# Patient Record
Sex: MALE | Race: OTHER | Employment: OTHER | URBAN - METROPOLITAN AREA
[De-identification: names, ages, dates, MRNs, and addresses within clinical notes are randomized per-mention and may not be internally consistent; named-entity substitution may affect disease eponyms.]

---

## 2019-07-24 ENCOUNTER — HOSPITAL ENCOUNTER (INPATIENT)
Age: 84
LOS: 5 days | Discharge: HOME HEALTH | DRG: 871 | End: 2019-07-29
Attending: EMERGENCY MEDICINE | Admitting: INTERNAL MEDICINE
Payer: MEDICARE

## 2019-07-24 ENCOUNTER — APPOINTMENT (OUTPATIENT)
Dept: GENERAL RADIOLOGY | Age: 84
DRG: 871 | End: 2019-07-24
Payer: MEDICARE

## 2019-07-24 DIAGNOSIS — L89.302 PRESSURE INJURY OF BUTTOCK, STAGE 2, UNSPECIFIED LATERALITY (HCC): ICD-10-CM

## 2019-07-24 DIAGNOSIS — R73.9 HYPERGLYCEMIA: Primary | ICD-10-CM

## 2019-07-24 DIAGNOSIS — N28.9 ACUTE RENAL INSUFFICIENCY: ICD-10-CM

## 2019-07-24 DIAGNOSIS — J18.9 PNEUMONIA DUE TO ORGANISM: ICD-10-CM

## 2019-07-24 DIAGNOSIS — A41.9 SEPTICEMIA (HCC): ICD-10-CM

## 2019-07-24 LAB
ALBUMIN SERPL-MCNC: 2.3 G/DL (ref 3.5–4.6)
ALP BLD-CCNC: 120 U/L (ref 35–104)
ALT SERPL-CCNC: 26 U/L (ref 0–41)
ANION GAP SERPL CALCULATED.3IONS-SCNC: 13 MEQ/L (ref 9–15)
AST SERPL-CCNC: 22 U/L (ref 0–40)
BACTERIA: ABNORMAL /HPF
BASE EXCESS ARTERIAL: -8 (ref -3–3)
BASOPHILS ABSOLUTE: 0 K/UL (ref 0–0.2)
BASOPHILS RELATIVE PERCENT: 0.1 %
BILIRUB SERPL-MCNC: 0.3 MG/DL (ref 0.2–0.7)
BILIRUBIN URINE: NEGATIVE
BLOOD, URINE: ABNORMAL
BUN BLDV-MCNC: 74 MG/DL (ref 8–23)
CALCIUM IONIZED: 1.23 MMOL/L (ref 1.12–1.32)
CALCIUM SERPL-MCNC: 8.7 MG/DL (ref 8.5–9.9)
CHLORIDE BLD-SCNC: 91 MEQ/L (ref 95–107)
CLARITY: ABNORMAL
CO2: 18 MEQ/L (ref 20–31)
COLOR: YELLOW
CREAT SERPL-MCNC: 3.35 MG/DL (ref 0.7–1.2)
EKG ATRIAL RATE: 72 BPM
EKG ATRIAL RATE: 95 BPM
EKG P AXIS: 67 DEGREES
EKG P AXIS: 68 DEGREES
EKG P-R INTERVAL: 142 MS
EKG P-R INTERVAL: 154 MS
EKG Q-T INTERVAL: 380 MS
EKG Q-T INTERVAL: 428 MS
EKG QRS DURATION: 140 MS
EKG QRS DURATION: 140 MS
EKG QTC CALCULATION (BAZETT): 468 MS
EKG QTC CALCULATION (BAZETT): 477 MS
EKG R AXIS: 69 DEGREES
EKG R AXIS: 79 DEGREES
EKG T AXIS: 59 DEGREES
EKG T AXIS: 72 DEGREES
EKG VENTRICULAR RATE: 72 BPM
EKG VENTRICULAR RATE: 95 BPM
EOSINOPHILS ABSOLUTE: 0 K/UL (ref 0–0.7)
EOSINOPHILS RELATIVE PERCENT: 0 %
EPITHELIAL CELLS, UA: ABNORMAL /HPF (ref 0–5)
FOLATE: 7.5 NG/ML (ref 7.3–26.1)
GFR AFRICAN AMERICAN: 21.2
GFR AFRICAN AMERICAN: 27
GFR NON-AFRICAN AMERICAN: 17.5
GFR NON-AFRICAN AMERICAN: 22
GLOBULIN: 5.1 G/DL (ref 2.3–3.5)
GLUCOSE BLD-MCNC: 226 MG/DL (ref 60–115)
GLUCOSE BLD-MCNC: 367 MG/DL (ref 60–115)
GLUCOSE BLD-MCNC: 601 MG/DL (ref 60–115)
GLUCOSE BLD-MCNC: 602 MG/DL (ref 70–99)
GLUCOSE URINE: >=1000 MG/DL
HCO3 ARTERIAL: 16.2 MMOL/L (ref 21–29)
HCT VFR BLD CALC: 24.5 % (ref 42–52)
HEMOGLOBIN: 8 GM/DL (ref 13.5–17.5)
HEMOGLOBIN: 8.4 G/DL (ref 14–18)
HYALINE CASTS: ABNORMAL /HPF (ref 0–5)
IRON SATURATION: 11 % (ref 11–46)
IRON: 17 UG/DL (ref 59–158)
KETONES, URINE: NEGATIVE MG/DL
LACTATE: 0.69 MMOL/L (ref 0.4–2)
LACTIC ACID: 1.6 MMOL/L (ref 0.5–2.2)
LEUKOCYTE ESTERASE, URINE: ABNORMAL
LYMPHOCYTES ABSOLUTE: 0.9 K/UL (ref 1–4.8)
LYMPHOCYTES RELATIVE PERCENT: 5 %
MCH RBC QN AUTO: 30.4 PG (ref 27–31.3)
MCHC RBC AUTO-ENTMCNC: 34.3 % (ref 33–37)
MCV RBC AUTO: 88.7 FL (ref 80–100)
MONOCYTES ABSOLUTE: 0.4 K/UL (ref 0.2–0.8)
MONOCYTES RELATIVE PERCENT: 1.9 %
NEUTROPHILS ABSOLUTE: 17.6 K/UL (ref 1.4–6.5)
NEUTROPHILS RELATIVE PERCENT: 93 %
NITRITE, URINE: NEGATIVE
O2 SAT, ARTERIAL: 97 % (ref 93–100)
PCO2 ARTERIAL: 25 MM HG (ref 35–45)
PDW BLD-RTO: 13.9 % (ref 11.5–14.5)
PERFORMED ON: ABNORMAL
PH ARTERIAL: 7.43 (ref 7.35–7.45)
PH UA: 5 (ref 5–9)
PLATELET # BLD: 235 K/UL (ref 130–400)
PLATELET SLIDE REVIEW: ADEQUATE
PO2 ARTERIAL: 90 MM HG (ref 75–108)
POC CHLORIDE: 97 MEQ/L (ref 99–110)
POC CREATININE: 2.7 MG/DL (ref 0.8–1.3)
POC FIO2: 21
POC HEMATOCRIT: 23 % (ref 41–53)
POC POTASSIUM: 4 MEQ/L (ref 3.5–5.1)
POC SAMPLE TYPE: ABNORMAL
POC SODIUM: 126 MEQ/L (ref 136–145)
POTASSIUM SERPL-SCNC: 4.4 MEQ/L (ref 3.4–4.9)
PROTEIN UA: 30 MG/DL
RBC # BLD: 2.76 M/UL (ref 4.7–6.1)
RBC UA: ABNORMAL /HPF (ref 0–5)
SODIUM BLD-SCNC: 122 MEQ/L (ref 135–144)
SPECIFIC GRAVITY UA: 1.01 (ref 1–1.03)
TCO2 ARTERIAL: 17 (ref 22–29)
TOTAL IRON BINDING CAPACITY: 149 UG/DL (ref 178–450)
TOTAL PROTEIN: 7.4 G/DL (ref 6.3–8)
URINE REFLEX TO CULTURE: YES
UROBILINOGEN, URINE: 0.2 E.U./DL
VITAMIN B-12: 1203 PG/ML (ref 232–1245)
WBC # BLD: 18.9 K/UL (ref 4.8–10.8)
WBC UA: >100 /HPF (ref 0–5)

## 2019-07-24 PROCEDURE — 82607 VITAMIN B-12: CPT

## 2019-07-24 PROCEDURE — 83550 IRON BINDING TEST: CPT

## 2019-07-24 PROCEDURE — 93005 ELECTROCARDIOGRAM TRACING: CPT | Performed by: INTERNAL MEDICINE

## 2019-07-24 PROCEDURE — 81001 URINALYSIS AUTO W/SCOPE: CPT

## 2019-07-24 PROCEDURE — 94664 DEMO&/EVAL PT USE INHALER: CPT

## 2019-07-24 PROCEDURE — 84295 ASSAY OF SERUM SODIUM: CPT

## 2019-07-24 PROCEDURE — 2580000003 HC RX 258: Performed by: EMERGENCY MEDICINE

## 2019-07-24 PROCEDURE — 93005 ELECTROCARDIOGRAM TRACING: CPT | Performed by: EMERGENCY MEDICINE

## 2019-07-24 PROCEDURE — 99222 1ST HOSP IP/OBS MODERATE 55: CPT | Performed by: INTERNAL MEDICINE

## 2019-07-24 PROCEDURE — 96360 HYDRATION IV INFUSION INIT: CPT

## 2019-07-24 PROCEDURE — 87149 DNA/RNA DIRECT PROBE: CPT

## 2019-07-24 PROCEDURE — 2580000003 HC RX 258: Performed by: NURSE PRACTITIONER

## 2019-07-24 PROCEDURE — 99285 EMERGENCY DEPT VISIT HI MDM: CPT

## 2019-07-24 PROCEDURE — 96361 HYDRATE IV INFUSION ADD-ON: CPT

## 2019-07-24 PROCEDURE — 2580000003 HC RX 258: Performed by: INTERNAL MEDICINE

## 2019-07-24 PROCEDURE — 82746 ASSAY OF FOLIC ACID SERUM: CPT

## 2019-07-24 PROCEDURE — 87186 SC STD MICRODIL/AGAR DIL: CPT

## 2019-07-24 PROCEDURE — 83605 ASSAY OF LACTIC ACID: CPT

## 2019-07-24 PROCEDURE — 6370000000 HC RX 637 (ALT 250 FOR IP): Performed by: EMERGENCY MEDICINE

## 2019-07-24 PROCEDURE — 82435 ASSAY OF BLOOD CHLORIDE: CPT

## 2019-07-24 PROCEDURE — 6370000000 HC RX 637 (ALT 250 FOR IP): Performed by: INTERNAL MEDICINE

## 2019-07-24 PROCEDURE — 6360000002 HC RX W HCPCS: Performed by: EMERGENCY MEDICINE

## 2019-07-24 PROCEDURE — 84132 ASSAY OF SERUM POTASSIUM: CPT

## 2019-07-24 PROCEDURE — 85014 HEMATOCRIT: CPT

## 2019-07-24 PROCEDURE — 85025 COMPLETE CBC W/AUTO DIFF WBC: CPT

## 2019-07-24 PROCEDURE — 94760 N-INVAS EAR/PLS OXIMETRY 1: CPT

## 2019-07-24 PROCEDURE — 87086 URINE CULTURE/COLONY COUNT: CPT

## 2019-07-24 PROCEDURE — 87077 CULTURE AEROBIC IDENTIFY: CPT

## 2019-07-24 PROCEDURE — 82803 BLOOD GASES ANY COMBINATION: CPT

## 2019-07-24 PROCEDURE — 80053 COMPREHEN METABOLIC PANEL: CPT

## 2019-07-24 PROCEDURE — 82330 ASSAY OF CALCIUM: CPT

## 2019-07-24 PROCEDURE — 83540 ASSAY OF IRON: CPT

## 2019-07-24 PROCEDURE — 36600 WITHDRAWAL OF ARTERIAL BLOOD: CPT

## 2019-07-24 PROCEDURE — 1210000000 HC MED SURG R&B

## 2019-07-24 PROCEDURE — 87040 BLOOD CULTURE FOR BACTERIA: CPT

## 2019-07-24 PROCEDURE — 71045 X-RAY EXAM CHEST 1 VIEW: CPT

## 2019-07-24 PROCEDURE — 6360000002 HC RX W HCPCS: Performed by: NURSE PRACTITIONER

## 2019-07-24 PROCEDURE — 36415 COLL VENOUS BLD VENIPUNCTURE: CPT

## 2019-07-24 PROCEDURE — 82565 ASSAY OF CREATININE: CPT

## 2019-07-24 RX ORDER — 0.9 % SODIUM CHLORIDE 0.9 %
1000 INTRAVENOUS SOLUTION INTRAVENOUS ONCE
Status: COMPLETED | OUTPATIENT
Start: 2019-07-24 | End: 2019-07-24

## 2019-07-24 RX ORDER — NICOTINE POLACRILEX 4 MG
15 LOZENGE BUCCAL PRN
Status: DISCONTINUED | OUTPATIENT
Start: 2019-07-24 | End: 2019-07-29 | Stop reason: HOSPADM

## 2019-07-24 RX ORDER — DEXTROSE MONOHYDRATE 50 MG/ML
100 INJECTION, SOLUTION INTRAVENOUS PRN
Status: DISCONTINUED | OUTPATIENT
Start: 2019-07-24 | End: 2019-07-29 | Stop reason: HOSPADM

## 2019-07-24 RX ORDER — HEPARIN SODIUM 5000 [USP'U]/ML
5000 INJECTION, SOLUTION INTRAVENOUS; SUBCUTANEOUS EVERY 8 HOURS SCHEDULED
Status: DISCONTINUED | OUTPATIENT
Start: 2019-07-24 | End: 2019-07-24

## 2019-07-24 RX ORDER — ONDANSETRON 2 MG/ML
4 INJECTION INTRAMUSCULAR; INTRAVENOUS EVERY 6 HOURS PRN
Status: DISCONTINUED | OUTPATIENT
Start: 2019-07-24 | End: 2019-07-29 | Stop reason: HOSPADM

## 2019-07-24 RX ORDER — 0.9 % SODIUM CHLORIDE 0.9 %
500 INTRAVENOUS SOLUTION INTRAVENOUS ONCE
Status: COMPLETED | OUTPATIENT
Start: 2019-07-24 | End: 2019-07-24

## 2019-07-24 RX ORDER — SODIUM CHLORIDE 9 MG/ML
INJECTION, SOLUTION INTRAVENOUS CONTINUOUS
Status: DISCONTINUED | OUTPATIENT
Start: 2019-07-24 | End: 2019-07-29 | Stop reason: HOSPADM

## 2019-07-24 RX ORDER — ALBUTEROL SULFATE 2.5 MG/3ML
2.5 SOLUTION RESPIRATORY (INHALATION)
Status: DISCONTINUED | OUTPATIENT
Start: 2019-07-24 | End: 2019-07-29 | Stop reason: HOSPADM

## 2019-07-24 RX ORDER — HEPARIN SODIUM 5000 [USP'U]/ML
5000 INJECTION, SOLUTION INTRAVENOUS; SUBCUTANEOUS 2 TIMES DAILY
Status: DISCONTINUED | OUTPATIENT
Start: 2019-07-25 | End: 2019-07-29 | Stop reason: HOSPADM

## 2019-07-24 RX ORDER — ATORVASTATIN CALCIUM 20 MG/1
20 TABLET, FILM COATED ORAL DAILY
Status: ON HOLD | COMMUNITY
End: 2020-12-18

## 2019-07-24 RX ORDER — INSULIN GLARGINE 100 [IU]/ML
30 INJECTION, SOLUTION SUBCUTANEOUS NIGHTLY
Status: DISCONTINUED | OUTPATIENT
Start: 2019-07-24 | End: 2019-07-25

## 2019-07-24 RX ORDER — GLIPIZIDE 10 MG/1
10 TABLET ORAL
Status: ON HOLD | COMMUNITY
End: 2019-07-29 | Stop reason: HOSPADM

## 2019-07-24 RX ORDER — ACETAMINOPHEN 325 MG/1
650 TABLET ORAL EVERY 4 HOURS PRN
Status: DISCONTINUED | OUTPATIENT
Start: 2019-07-24 | End: 2019-07-29 | Stop reason: HOSPADM

## 2019-07-24 RX ORDER — DEXTROSE MONOHYDRATE 25 G/50ML
12.5 INJECTION, SOLUTION INTRAVENOUS PRN
Status: DISCONTINUED | OUTPATIENT
Start: 2019-07-24 | End: 2019-07-29 | Stop reason: HOSPADM

## 2019-07-24 RX ORDER — SODIUM CHLORIDE 9 MG/ML
INJECTION, SOLUTION INTRAVENOUS CONTINUOUS
Status: CANCELLED | OUTPATIENT
Start: 2019-07-24

## 2019-07-24 RX ORDER — SODIUM CHLORIDE 0.9 % (FLUSH) 0.9 %
10 SYRINGE (ML) INJECTION PRN
Status: DISCONTINUED | OUTPATIENT
Start: 2019-07-24 | End: 2019-07-29 | Stop reason: HOSPADM

## 2019-07-24 RX ORDER — ATORVASTATIN CALCIUM 20 MG/1
20 TABLET, FILM COATED ORAL DAILY
Status: DISCONTINUED | OUTPATIENT
Start: 2019-07-24 | End: 2019-07-29 | Stop reason: HOSPADM

## 2019-07-24 RX ORDER — SODIUM CHLORIDE 0.9 % (FLUSH) 0.9 %
10 SYRINGE (ML) INJECTION EVERY 12 HOURS SCHEDULED
Status: DISCONTINUED | OUTPATIENT
Start: 2019-07-24 | End: 2019-07-29 | Stop reason: HOSPADM

## 2019-07-24 RX ADMIN — AZITHROMYCIN DIHYDRATE 500 MG: 500 INJECTION, POWDER, LYOPHILIZED, FOR SOLUTION INTRAVENOUS at 13:55

## 2019-07-24 RX ADMIN — CEFTRIAXONE SODIUM 1 G: 1 INJECTION, POWDER, FOR SOLUTION INTRAMUSCULAR; INTRAVENOUS at 13:19

## 2019-07-24 RX ADMIN — ATORVASTATIN CALCIUM 20 MG: 20 TABLET, FILM COATED ORAL at 21:35

## 2019-07-24 RX ADMIN — SODIUM CHLORIDE 500 ML: 9 INJECTION, SOLUTION INTRAVENOUS at 19:52

## 2019-07-24 RX ADMIN — SODIUM CHLORIDE: 900 INJECTION INTRAVENOUS at 15:35

## 2019-07-24 RX ADMIN — SODIUM CHLORIDE 500 ML: 9 INJECTION, SOLUTION INTRAVENOUS at 22:20

## 2019-07-24 RX ADMIN — INSULIN GLARGINE 30 UNITS: 100 INJECTION, SOLUTION SUBCUTANEOUS at 21:35

## 2019-07-24 RX ADMIN — INSULIN LISPRO 10 UNITS: 100 INJECTION, SOLUTION INTRAVENOUS; SUBCUTANEOUS at 18:12

## 2019-07-24 RX ADMIN — HEPARIN SODIUM 5000 UNITS: 5000 INJECTION, SOLUTION INTRAVENOUS; SUBCUTANEOUS at 15:41

## 2019-07-24 RX ADMIN — SODIUM CHLORIDE: 900 INJECTION INTRAVENOUS at 22:53

## 2019-07-24 RX ADMIN — SODIUM CHLORIDE 1000 ML: 9 INJECTION, SOLUTION INTRAVENOUS at 13:21

## 2019-07-24 RX ADMIN — INSULIN HUMAN 10 UNITS: 100 INJECTION, SOLUTION PARENTERAL at 13:25

## 2019-07-24 RX ADMIN — SODIUM CHLORIDE 500 ML: 9 INJECTION, SOLUTION INTRAVENOUS at 11:19

## 2019-07-24 RX ADMIN — SODIUM CHLORIDE 100 ML/HR: 9 INJECTION, SOLUTION INTRAVENOUS at 11:20

## 2019-07-24 ASSESSMENT — ENCOUNTER SYMPTOMS
COUGH: 1
RHINORRHEA: 0
VOMITING: 0
EYES NEGATIVE: 1
SHORTNESS OF BREATH: 0
ABDOMINAL PAIN: 0
TROUBLE SWALLOWING: 0
WHEEZING: 0
ALLERGIC/IMMUNOLOGIC NEGATIVE: 1
NAUSEA: 0

## 2019-07-24 ASSESSMENT — PAIN SCALES - GENERAL: PAINLEVEL_OUTOF10: 5

## 2019-07-24 ASSESSMENT — PAIN DESCRIPTION - PAIN TYPE: TYPE: ACUTE PAIN

## 2019-07-24 NOTE — H&P
Hospital Medicine History & Physical      PCP: Addis Nam MD    Date of Admission: 7/24/2019    Date of Service: Pt seen/examined on 7/24/19  Admitted to Inpatient with expected LOS greater than two midnights due to medical therapy. Chief Complaint:  progressive wekaness      History Of Present Illness:     80 y.o. male with Pmhx of HLD, HTN, CKD,  DM II, paroxsymal A.fib, right eye blindness, who presented to [facility] with c/o progressive weakness for the last 4 days with associated moist cough. Wife added pt had a temp of 101 last night. Also states he noticed pt has a wound on sacrum which she noticed 3 days ago but doesn't know how long he has had the wound. She states wound drains at times. Wife states pt also doesn't drink much fluid but denies pt having  any nausea, vomiting or diarrhea. Pt BG also elevated @ 602 , however states he took his glipizide yesterday but not today. Past Medical History:          Diagnosis Date    Diabetes mellitus ([facility] [address]     Hyperlipidemia     Hypertension     Kidney stone        Past Surgical History:      History reviewed. No pertinent surgical history. Medications Prior to Admission:      Prior to Admission medications    Medication Sig Start Date End Date Taking? Authorizing Provider   metoprolol tartrate (LOPRESSOR) 25 MG tablet Take 25 mg by mouth 2 times daily   Yes Historical Provider, MD   glipiZIDE (GLUCOTROL) 10 MG tablet Take 10 mg by mouth 2 times daily (before meals)   Yes Historical Provider, MD   atorvastatin (LIPITOR) 20 MG tablet Take 20 mg by mouth daily   Yes Historical Provider, MD       Allergies:  Pcn [penicillins]    Social History:      The patient currently lives @ home     TOBACCO:   reports that he has quit smoking. He has never used smokeless tobacco.  ETOH:   reports that he drank alcohol. Family History:    Reviewed in detail and negative for DM, CAD, Cancer, CVA.  Positive as follows:    Sister- pancreatic cancer     REVIEW

## 2019-07-25 LAB
ALBUMIN SERPL-MCNC: 2 G/DL (ref 3.5–4.6)
ALP BLD-CCNC: 106 U/L (ref 35–104)
ALT SERPL-CCNC: 46 U/L (ref 0–41)
ANION GAP SERPL CALCULATED.3IONS-SCNC: 14 MEQ/L (ref 9–15)
ANION GAP SERPL CALCULATED.3IONS-SCNC: 15 MEQ/L (ref 9–15)
AST SERPL-CCNC: 73 U/L (ref 0–40)
BILIRUB SERPL-MCNC: 0.3 MG/DL (ref 0.2–0.7)
BUN BLDV-MCNC: 67 MG/DL (ref 8–23)
BUN BLDV-MCNC: 67 MG/DL (ref 8–23)
CALCIUM SERPL-MCNC: 8.1 MG/DL (ref 8.5–9.9)
CALCIUM SERPL-MCNC: 8.2 MG/DL (ref 8.5–9.9)
CHLORIDE BLD-SCNC: 102 MEQ/L (ref 95–107)
CHLORIDE BLD-SCNC: 102 MEQ/L (ref 95–107)
CO2: 15 MEQ/L (ref 20–31)
CO2: 15 MEQ/L (ref 20–31)
CREAT SERPL-MCNC: 2.92 MG/DL (ref 0.7–1.2)
CREAT SERPL-MCNC: 2.94 MG/DL (ref 0.7–1.2)
GFR AFRICAN AMERICAN: 24.6
GFR AFRICAN AMERICAN: 24.8
GFR NON-AFRICAN AMERICAN: 20.3
GFR NON-AFRICAN AMERICAN: 20.5
GLOBULIN: 4.6 G/DL (ref 2.3–3.5)
GLUCOSE BLD-MCNC: 247 MG/DL (ref 70–99)
GLUCOSE BLD-MCNC: 249 MG/DL (ref 70–99)
GLUCOSE BLD-MCNC: 256 MG/DL (ref 60–115)
GLUCOSE BLD-MCNC: 282 MG/DL (ref 60–115)
GLUCOSE BLD-MCNC: 299 MG/DL (ref 60–115)
GLUCOSE BLD-MCNC: 343 MG/DL (ref 60–115)
HBA1C MFR BLD: 10.1 % (ref 4.8–5.9)
HCT VFR BLD CALC: 26.1 % (ref 42–52)
HEMOGLOBIN: 8.8 G/DL (ref 14–18)
MAGNESIUM: 1.6 MG/DL (ref 1.7–2.4)
MCH RBC QN AUTO: 30.5 PG (ref 27–31.3)
MCHC RBC AUTO-ENTMCNC: 33.8 % (ref 33–37)
MCV RBC AUTO: 90.4 FL (ref 80–100)
PDW BLD-RTO: 14.3 % (ref 11.5–14.5)
PERFORMED ON: ABNORMAL
PLATELET # BLD: 226 K/UL (ref 130–400)
PLATELET SLIDE REVIEW: ADEQUATE
POTASSIUM REFLEX MAGNESIUM: 3.9 MEQ/L (ref 3.4–4.9)
POTASSIUM SERPL-SCNC: 4 MEQ/L (ref 3.4–4.9)
RBC # BLD: 2.88 M/UL (ref 4.7–6.1)
SODIUM BLD-SCNC: 131 MEQ/L (ref 135–144)
SODIUM BLD-SCNC: 132 MEQ/L (ref 135–144)
TOTAL PROTEIN: 6.6 G/DL (ref 6.3–8)
TSH SERPL DL<=0.05 MIU/L-ACNC: 0.15 UIU/ML (ref 0.44–3.86)
WBC # BLD: 21.7 K/UL (ref 4.8–10.8)

## 2019-07-25 PROCEDURE — 2580000003 HC RX 258: Performed by: INTERNAL MEDICINE

## 2019-07-25 PROCEDURE — 6360000002 HC RX W HCPCS: Performed by: INTERNAL MEDICINE

## 2019-07-25 PROCEDURE — 97162 PT EVAL MOD COMPLEX 30 MIN: CPT

## 2019-07-25 PROCEDURE — 85027 COMPLETE CBC AUTOMATED: CPT

## 2019-07-25 PROCEDURE — 2580000003 HC RX 258: Performed by: EMERGENCY MEDICINE

## 2019-07-25 PROCEDURE — 6360000002 HC RX W HCPCS: Performed by: NURSE PRACTITIONER

## 2019-07-25 PROCEDURE — 99232 SBSQ HOSP IP/OBS MODERATE 35: CPT | Performed by: INTERNAL MEDICINE

## 2019-07-25 PROCEDURE — 93010 ELECTROCARDIOGRAM REPORT: CPT | Performed by: INTERNAL MEDICINE

## 2019-07-25 PROCEDURE — 83036 HEMOGLOBIN GLYCOSYLATED A1C: CPT

## 2019-07-25 PROCEDURE — 6370000000 HC RX 637 (ALT 250 FOR IP): Performed by: NURSE PRACTITIONER

## 2019-07-25 PROCEDURE — 36415 COLL VENOUS BLD VENIPUNCTURE: CPT

## 2019-07-25 PROCEDURE — 99222 1ST HOSP IP/OBS MODERATE 55: CPT | Performed by: INTERNAL MEDICINE

## 2019-07-25 PROCEDURE — 99213 OFFICE O/P EST LOW 20 MIN: CPT

## 2019-07-25 PROCEDURE — 6370000000 HC RX 637 (ALT 250 FOR IP): Performed by: INTERNAL MEDICINE

## 2019-07-25 PROCEDURE — 80053 COMPREHEN METABOLIC PANEL: CPT

## 2019-07-25 PROCEDURE — 1210000000 HC MED SURG R&B

## 2019-07-25 PROCEDURE — 83735 ASSAY OF MAGNESIUM: CPT

## 2019-07-25 PROCEDURE — 84443 ASSAY THYROID STIM HORMONE: CPT

## 2019-07-25 PROCEDURE — 97167 OT EVAL HIGH COMPLEX 60 MIN: CPT

## 2019-07-25 RX ORDER — INSULIN GLARGINE 100 [IU]/ML
40 INJECTION, SOLUTION SUBCUTANEOUS NIGHTLY
Status: DISCONTINUED | OUTPATIENT
Start: 2019-07-25 | End: 2019-07-26

## 2019-07-25 RX ADMIN — SODIUM CHLORIDE: 9 INJECTION, SOLUTION INTRAVENOUS at 08:49

## 2019-07-25 RX ADMIN — INSULIN GLARGINE 40 UNITS: 100 INJECTION, SOLUTION SUBCUTANEOUS at 21:40

## 2019-07-25 RX ADMIN — INSULIN LISPRO 6 UNITS: 100 INJECTION, SOLUTION INTRAVENOUS; SUBCUTANEOUS at 17:23

## 2019-07-25 RX ADMIN — HEPARIN SODIUM 5000 UNITS: 5000 INJECTION, SOLUTION INTRAVENOUS; SUBCUTANEOUS at 08:49

## 2019-07-25 RX ADMIN — ATORVASTATIN CALCIUM 20 MG: 20 TABLET, FILM COATED ORAL at 21:48

## 2019-07-25 RX ADMIN — INSULIN LISPRO 8 UNITS: 100 INJECTION, SOLUTION INTRAVENOUS; SUBCUTANEOUS at 12:47

## 2019-07-25 RX ADMIN — TOBRAMYCIN SULFATE 220.4 MG: 40 INJECTION, SOLUTION INTRAMUSCULAR; INTRAVENOUS at 17:22

## 2019-07-25 RX ADMIN — MEROPENEM 1 G: 1 INJECTION, POWDER, FOR SOLUTION INTRAVENOUS at 05:44

## 2019-07-25 RX ADMIN — HEPARIN SODIUM 5000 UNITS: 5000 INJECTION, SOLUTION INTRAVENOUS; SUBCUTANEOUS at 21:40

## 2019-07-25 RX ADMIN — METOPROLOL TARTRATE 25 MG: 25 TABLET, FILM COATED ORAL at 08:49

## 2019-07-25 RX ADMIN — METOPROLOL TARTRATE 25 MG: 25 TABLET, FILM COATED ORAL at 21:49

## 2019-07-25 RX ADMIN — MEROPENEM 1 G: 1 INJECTION, POWDER, FOR SOLUTION INTRAVENOUS at 19:11

## 2019-07-25 ASSESSMENT — ENCOUNTER SYMPTOMS
GASTROINTESTINAL NEGATIVE: 1
COUGH: 1

## 2019-07-25 ASSESSMENT — PAIN SCALES - GENERAL: PAINLEVEL_OUTOF10: 0

## 2019-07-25 NOTE — CONSULTS
[address]                      [facility], [address] [facility]                                  CONSULTATION    PATIENT NAME: Yoon Mijares                       :        1932  MED REC NO:   [id number]                            ROOM:       B937  ACCOUNT NO:   [de-identified]                           ADMIT DATE: 2019  PROVIDER:     Rosalinda Perez MD    CONSULT DATE:  2019    REASON FOR CONSULT:  Management of uncontrolled diabetes. CHIEF COMPLAINT AND HISTORY OF PRESENT ILLNESS:  The patient is an  24-year-old male with known history of diabetes, chronic kidney disease,  history of atrial fibrillation, admitted with community-acquired  pneumonia, hyponatremia, dehydration, and also has some encephalopathy. He was having progressive weakness and was not able to move around at  home for the last few days. Did have some cough and a temperature of  101. They also noticed a wound on his sacral area with some drainage. He has had diabetes, for which he has been taking oral medication  including glipizide and Tradjenta. Blood sugar was 600 initially. Did  get 10 units of regular IV, it has gone down to 367. Chemistries were  reviewed from earlier this morning. Sodium was 122, potassium 4.4,  chloride was 91, CO2 was 18, BUN was 74, and creatinine was 3.35. I  reviewed his prior labs from past.  There has been fluctuating  creatinine level of 1 to 3 range. His A1c has been ordered, results are  not available for review. Reviewed labs from before. A1c from 2018 was  7.4 and 2017, 7.3. Home medications for his diabetes includes glipizide  10 mg twice daily. The patient is not able to provide any detailed  history. PAST MEDICAL HISTORY:  Significant for type 2 diabetes, chronic kidney  disease, hypertension, and hypercholesterolemia. SURGICAL HISTORY:  Reviewed and noncontributory. FAMILY HISTORY:  Reviewed and noncontributory.     PERSONAL AND SOCIAL HISTORY:  Former smoker. HOME MEDICATIONS:  Glipizide, metoprolol, and Lipitor. Meds here  include Humalog sliding scale coverage, Rocephin, Zithromax, Lopressor,  and normal saline. ALLERGIES:  PENICILLIN. REVIEW OF SYSTEMS:  Other than weakness, cough, congestion, and  hyperglycemia, 14-point review of system was negative. PHYSICAL EXAMINATION:  GENERAL:  On examination, the patient is drowsy, does obey some  commands. VITAL SIGNS:  Blood pressure was 131/57, pulse rate was 83, respiratory  rate is 20, temperature was 98. 1. HEENT:  Reveals blind right eye. Tongue was very dry. NECK:  Supple. No thyromegaly was present. CHEST:  Lungs were showing minimal basal crackles bilateral.  No  wheezing was noted. CARDIOVASCULAR:  Heart sound were normal.  No murmurs or thrills were  present. ABDOMEN:  Soft, nonobese. Bowel sounds were present. No tenderness or  organomegaly. LOWER EXTREMITIES:  Reveal no edema. Moving lower extremities and upper  extremities. SKIN:  Dry. LABS:  Sodium 122, potassium 4.4, chloride was 91, CO2 18, BUN 74,  creatinine 3.35, WBC count 18.9, and hemoglobin 8.4. Chest x-ray  revealed atelectasis versus left lower lobe pneumonia. ASSESSMENT:  Uncontrolled diabetes worsened with pneumonia, chronic  kidney disease, and dehydration. PLAN:  Start him on Lantus 30 at night, Humalog 10 with each meals plus  Humalog sliding scale. Continue on hydration IV antibiotics. Await  A1c. The patient might need lower dose of 75/25 Humalog prior to  discharge. We will hold off his oral medication in view of chronic  kidney disease. Goal for blood sugars here are 140 to 200 range. Avoid  hypoglycemia. Thank you for the consult.         Gene Soares MD    D: 07/24/2019 19:53:10       T: 07/24/2019 23:01:28     [email or web address]  Job#: [id number]     Doc#: [id number]    CC:

## 2019-07-25 NOTE — PROGRESS NOTES
Balance: Minimal assistance    Functional Endurance:  Activity Tolerance  Activity Tolerance: Patient limited by fatigue    D/C Recommendations: therapy    Equipment Recommendations:  TBD      OT Follow Up:  OT D/C RECOMMENDATIONS  REQUIRES OT FOLLOW UP: Yes       Assessment/Discharge Disposition:  Assessment: Pt is an 79 y/o M admitted secondary to progressive weakness. Pt Ind prior to 3 wks ago.   pt has above deficits and would benefit from [employer] deficits / Impairments: Decreased functional mobility , Decreased safe awareness, Decreased endurance, Decreased balance, Decreased coordination, Decreased high-level IADLs, Decreased ADL status, Decreased strength, Decreased cognition, Decreased vision/visual deficit, Decreased fine motor control  Prognosis: Good  Discharge Recommendations: Continue to assess pending progress  Decision Making: High Complexity  History: multi comorb  Exam: 11 deficits  Assistance / Modification: Max A    Six Click Score   How much help for putting on and taking off regular lower body clothing?: Total  How much help for Bathing?: A Lot  How much help for Toileting?: Total  How much help for putting on and taking off regular upper body clothing?: A Little  How much help for taking care of personal grooming?: A Little  How much help for eating meals?: A Little  AM-[facility] Inpatient Daily Activity Raw Score: 13  AM-PAC Inpatient ADL T-Scale Score : 32.03  ADL Inpatient CMS 0-100% Score: 63.03    Plan:  Plan  Times per week: 1-3x  Plan weeks: acute LOS  Current Treatment Recommendations: Strengthening, Balance Training, Endurance Training, Neuromuscular Re-education, Patient/Caregiver Education & Training, Cognitive/Perceptual Training, Home Management Training, Functional Mobility Training, Safety Education & Training, Equipment Evaluation, Education, & procurement, Self-Care / ADL    Goals:   Patient will:    - Improve functional endurance to tolerate/complete 30 mins of ADL's  - Be Ind in UB ADLs   - Be Mod I in LB ADLs  - Be Mod I in ADL transfers without LOB  - Be Ind in toileting tasks  - Improve B UE strength and endurance to [facility] in order to participate in self-care activities as projected. - Access appropriate D/C site with as few architectural barriers as possible.     Patient Goal:    Go home   Discussed and agreed upon:  yes Comments:     Therapy Time:   OT Individual Minutes  Time In: 8647  Time Out: 1150  Minutes: 15    Electronically signed by:    JODI Krishnan  7/25/2019, 12:10 PM

## 2019-07-25 NOTE — CONSULTS
Consults   Infectious Disease     Patient Name: Zia Mancera  Date: 7/25/2019  YOB: 1932  Medical Record Number: [id number]      Gram-negative sepsis  Urinary source        History of Present Illness:     HLD, HTN, CKD,  DM II      Patient presents fever 101 °F with cough productive shortness of breath  Maik's  Noticed a sacral wound 3 days prior to admission with drainage  Increasing fatigue  Decreased activity of daily living    No nausea vomiting diarrhea      Urine cultures growing group B streptococcus along with 100,000 colonies of a gram-negative corey  Is positive with more than 100 white cells per high-power field    Chest x-ray and review is clear      Cultures 2 sets are growing a gram-negative corey    Review of Systems   Constitutional: Positive for chills, diaphoresis, fatigue and fever. HENT: Negative. Respiratory: Positive for cough. Cardiovascular: Negative. Gastrointestinal: Negative. Genitourinary: Negative. Musculoskeletal: Negative. Neurological: Negative. Review of Systems: All 14 review of systems negative other than as stated above    Social History     Tobacco Use    Smoking status: Former Smoker    Smokeless tobacco: Never Used   Substance Use Topics    Alcohol use: Not Currently    Drug use: Never         Past Medical History:   Diagnosis Date    Diabetes mellitus ([facility] [address]     Hyperlipidemia     Hypertension     Kidney stone            History reviewed. No pertinent surgical history. No current facility-administered medications on file prior to encounter.       Current Outpatient Medications on File Prior to Encounter   Medication Sig Dispense Refill    metoprolol tartrate (LOPRESSOR) 25 MG tablet Take 25 mg by mouth 2 times daily      glipiZIDE (GLUCOTROL) 10 MG tablet Take 10 mg by mouth 2 times daily (before meals)      atorvastatin (LIPITOR) 20 MG tablet Take 20 mg by mouth daily         Allergies   Allergen Reactions    Pcn [Penicillins]          History reviewed. No pertinent family history. Physical Exam:      Physical Exam   Constitutional: He appears well-developed. HENT:   Head: Normocephalic. Eyes: Pupils are equal, round, and reactive to light. Neck: Normal range of motion. Neck supple. No JVD present. Cardiovascular: Intact distal pulses. No murmur heard. Pulmonary/Chest: Effort normal and breath sounds normal. No respiratory distress. He has no wheezes. He has no rales. He exhibits no tenderness. Abdominal: Soft. Bowel sounds are normal. He exhibits no distension and no mass. There is no hepatosplenomegaly, splenomegaly or hepatomegaly. There is no tenderness. There is no rebound, no guarding and no CVA tenderness. Musculoskeletal: He exhibits no edema. Lymphadenopathy:     He has no cervical adenopathy. He has no axillary adenopathy. Right: No inguinal, no supraclavicular and no epitrochlear adenopathy present. Left: No inguinal, no supraclavicular and no epitrochlear adenopathy present. Neurological: He is alert. Skin: No erythema. No pallor. Blood pressure (!) 134/53, pulse 89, temperature 98.6 °F (37 °C), resp. rate 18, height 5' 10\" (1.778 m), weight 162 lb (73.5 kg), SpO2 96 %.       .   Lab Results   Component Value Date    WBC 21.7 (H) 07/25/2019    HGB 8.8 (L) 07/25/2019    HCT 26.1 (L) 07/25/2019    MCV 90.4 07/25/2019     07/25/2019     Lab Results   Component Value Date     07/25/2019     07/25/2019    K 3.9 07/25/2019    K 4.0 07/25/2019     07/25/2019     07/25/2019    CO2 15 07/25/2019    CO2 15 07/25/2019    BUN 67 07/25/2019    BUN 67 07/25/2019    CREATININE 2.92 07/25/2019    CREATININE 2.94 07/25/2019    GLUCOSE 249 07/25/2019    GLUCOSE 247 07/25/2019    CALCIUM 8.1 07/25/2019    CALCIUM 8.2 07/25/2019      Total Protein 6.6 g/dL         Alb 2.0 g/dL       Total Bilirubin 0.3 mg/dL       Alkaline Phosphatase 106 U/L       ALT

## 2019-07-26 LAB
ANION GAP SERPL CALCULATED.3IONS-SCNC: 14 MEQ/L (ref 9–15)
BUN BLDV-MCNC: 66 MG/DL (ref 8–23)
CALCIUM SERPL-MCNC: 8.3 MG/DL (ref 8.5–9.9)
CHLORIDE BLD-SCNC: 105 MEQ/L (ref 95–107)
CO2: 15 MEQ/L (ref 20–31)
CREAT SERPL-MCNC: 2.7 MG/DL (ref 0.7–1.2)
GFR AFRICAN AMERICAN: 27.2
GFR NON-AFRICAN AMERICAN: 22.4
GLUCOSE BLD-MCNC: 102 MG/DL (ref 70–99)
GLUCOSE BLD-MCNC: 116 MG/DL (ref 60–115)
GLUCOSE BLD-MCNC: 126 MG/DL (ref 60–115)
GLUCOSE BLD-MCNC: 190 MG/DL (ref 60–115)
GLUCOSE BLD-MCNC: 86 MG/DL (ref 60–115)
HCT VFR BLD CALC: 27 % (ref 42–52)
HEMOGLOBIN: 9.2 G/DL (ref 14–18)
MCH RBC QN AUTO: 30 PG (ref 27–31.3)
MCHC RBC AUTO-ENTMCNC: 33.9 % (ref 33–37)
MCV RBC AUTO: 88.7 FL (ref 80–100)
ORGANISM: ABNORMAL
ORGANISM: ABNORMAL
PDW BLD-RTO: 14.2 % (ref 11.5–14.5)
PERFORMED ON: ABNORMAL
PERFORMED ON: NORMAL
PLATELET # BLD: 303 K/UL (ref 130–400)
POTASSIUM REFLEX MAGNESIUM: 3.8 MEQ/L (ref 3.4–4.9)
RBC # BLD: 3.05 M/UL (ref 4.7–6.1)
SODIUM BLD-SCNC: 134 MEQ/L (ref 135–144)
URINE CULTURE, ROUTINE: ABNORMAL
WBC # BLD: 19.1 K/UL (ref 4.8–10.8)

## 2019-07-26 PROCEDURE — 80048 BASIC METABOLIC PNL TOTAL CA: CPT

## 2019-07-26 PROCEDURE — 6370000000 HC RX 637 (ALT 250 FOR IP): Performed by: INTERNAL MEDICINE

## 2019-07-26 PROCEDURE — 6360000002 HC RX W HCPCS: Performed by: NURSE PRACTITIONER

## 2019-07-26 PROCEDURE — 2580000003 HC RX 258: Performed by: EMERGENCY MEDICINE

## 2019-07-26 PROCEDURE — 97110 THERAPEUTIC EXERCISES: CPT

## 2019-07-26 PROCEDURE — 6360000002 HC RX W HCPCS: Performed by: INTERNAL MEDICINE

## 2019-07-26 PROCEDURE — 36415 COLL VENOUS BLD VENIPUNCTURE: CPT

## 2019-07-26 PROCEDURE — 99232 SBSQ HOSP IP/OBS MODERATE 35: CPT | Performed by: PHYSICIAN ASSISTANT

## 2019-07-26 PROCEDURE — 2580000003 HC RX 258: Performed by: INTERNAL MEDICINE

## 2019-07-26 PROCEDURE — 99232 SBSQ HOSP IP/OBS MODERATE 35: CPT | Performed by: INTERNAL MEDICINE

## 2019-07-26 PROCEDURE — 85027 COMPLETE CBC AUTOMATED: CPT

## 2019-07-26 PROCEDURE — 97535 SELF CARE MNGMENT TRAINING: CPT

## 2019-07-26 PROCEDURE — 1210000000 HC MED SURG R&B

## 2019-07-26 RX ORDER — INSULIN GLARGINE 100 [IU]/ML
25 INJECTION, SOLUTION SUBCUTANEOUS NIGHTLY
Status: DISCONTINUED | OUTPATIENT
Start: 2019-07-26 | End: 2019-07-28

## 2019-07-26 RX ADMIN — METOPROLOL TARTRATE 25 MG: 25 TABLET, FILM COATED ORAL at 08:06

## 2019-07-26 RX ADMIN — HEPARIN SODIUM 5000 UNITS: 5000 INJECTION, SOLUTION INTRAVENOUS; SUBCUTANEOUS at 08:06

## 2019-07-26 RX ADMIN — INSULIN LISPRO 2 UNITS: 100 INJECTION, SOLUTION INTRAVENOUS; SUBCUTANEOUS at 12:33

## 2019-07-26 RX ADMIN — MEROPENEM 1 G: 1 INJECTION, POWDER, FOR SOLUTION INTRAVENOUS at 17:09

## 2019-07-26 RX ADMIN — MEROPENEM 1 G: 1 INJECTION, POWDER, FOR SOLUTION INTRAVENOUS at 05:16

## 2019-07-26 RX ADMIN — HEPARIN SODIUM 5000 UNITS: 5000 INJECTION, SOLUTION INTRAVENOUS; SUBCUTANEOUS at 21:24

## 2019-07-26 RX ADMIN — ATORVASTATIN CALCIUM 20 MG: 20 TABLET, FILM COATED ORAL at 21:30

## 2019-07-26 RX ADMIN — SODIUM CHLORIDE: 900 INJECTION INTRAVENOUS at 09:29

## 2019-07-26 RX ADMIN — METOPROLOL TARTRATE 25 MG: 25 TABLET, FILM COATED ORAL at 21:31

## 2019-07-26 ASSESSMENT — ENCOUNTER SYMPTOMS
COUGH: 1
GASTROINTESTINAL NEGATIVE: 1

## 2019-07-26 ASSESSMENT — PAIN SCALES - GENERAL: PAINLEVEL_OUTOF10: 0

## 2019-07-26 NOTE — PROGRESS NOTES
Endocrinology Progress Note    Assessment and Plan:   Assessment-  1. Type 2 diabetes, new to insulin  2. UTI  3. Pneumonia  4. CRF  5. Hyperglycemia    Plan-  1. Decrease Lantus 25 units at bedtime  2. Continue Humalog 14 units 3 times daily with meals  3. Monitor glycemic control closely  4. Avoid hypoglycemia  5. Target glucose range 100-200  6. Patient may be discharged home from endocrinology standpoint  7. Patient will need to be discharged home on insulin pens    POC Glucose:   Recent Labs     07/25/19  1147 07/25/19  1632 07/25/19  2042 07/26/19  0745 07/26/19  1133   POCGLU 343* 282* 299* 116* 190*     HGBA1C:  Recent Labs     07/25/19  0526   LABA1C 10.1*     CBC:   Recent Labs     07/25/19 0526 07/26/19  0518   WBC 21.7* 19.1*   HGB 8.8* 9.2*    303     CMP:    Recent Labs     07/24/19  1030 07/24/19  1316 07/25/19  0526 07/26/19  0518   *  --  132*  131* 134*   K 4.4  --  4.0  3.9 3.8   CL 91*  --  102  102 105   CO2 18*  --  15*  15* 15*   BUN 74*  --  67*  67* 66*   CREATININE 3.35* 2.7* 2.94*  2.92* 2.70*   GLUCOSE 602*  --  247*  249* 102*   CALCIUM 8.7  --  8.2*  8.1* 8.3*   LABGLOM 17.5* 22* 20.3*  20.5* 22.4*         CC:   Chief Complaint   Patient presents with    Wound Infection     coccyx       Subjective: Interval History: Patient is an 35-year-old type II diabetic male having progressive weakness and febrile with a cough for the last 3 or 4 days. He also has a sacral wound. He was diagnosed with pneumonia and and UTI and gram-negative sepsis. ID is on consult. He is currently on IV antibiotics. He was started on insulin for hyperglycemia. His glycemic control has improved on current insulin dosing regimen. Patient will need to be discharged home on insulin also.     Review of systems: denies polyuria, polydipsia, ABD pain, flank pain, N/V/D, or diaphoresis  Medications:   Scheduled Meds:   meropenem  1 g Intravenous Q12H    insulin glargine  40 Units Subcutaneous Nightly    insulin lispro  14 Units Subcutaneous TID     sodium chloride flush  10 mL Intravenous 2 times per day    insulin lispro  0-12 Units Subcutaneous TID WC    insulin lispro  0-6 Units Subcutaneous Nightly    atorvastatin  20 mg Oral Daily    metoprolol tartrate  25 mg Oral BID    heparin (porcine)  5,000 Units Subcutaneous BID     Continuous Infusions:   sodium chloride 100 mL/hr at 07/25/19 0849    sodium chloride 100 mL/hr at 07/26/19 0929    dextrose         Objective:   Vitals: BP (!) 163/59   Pulse 78   Temp 97.9 °F (36.6 °C)   Resp 18   Ht 5' 10\" (1.778 m)   Wt 162 lb (73.5 kg)   SpO2 98%   BMI 23.24 kg/m²    Wt Readings from Last 3 Encounters:   07/24/19 162 lb (73.5 kg)        General appearance: appears older than stated age, cooperative, fatigued and no distress  Skin: Skin color, texture, turgor normal. No rashes or lesions. Neck: no lymphadenopathy  Lungs: clear to auscultation bilaterally  Heart: regular rate and rhythm, S1, S2 normal, no murmur, click, rub or gallop  Abdomen: soft, non-tender. Bowel sounds normal. No masses,  no organomegaly.   Extremities: extremities normal, atraumatic, no cyanosis or edema    Patient Active Problem List:     Pneumonia     Uncontrolled type 2 diabetes mellitus with hyperglycemia [facility])            Electronically signed by CORNELIO Ramos on 7/26/2019 at 2:00 PM

## 2019-07-26 NOTE — CARE COORDINATION
Care Transition Nurse attempted to see [pt for PN edcuation, pt asleep, no family at bedside PN book and zone sheet left at bedside. Will follow for teaching and dc plan.

## 2019-07-26 NOTE — PROGRESS NOTES
Left: No inguinal, no supraclavicular and no epitrochlear adenopathy present. Neurological: He is alert. Skin: No erythema. No pallor.          Susceptibility     Klebsiella pneumoniae ssp pneumoniae (1)     Antibiotic Interpretation PATTIE Status    amoxicillin-clavulanate Sensitive 4 mcg/mL     ceFAZolin Sensitive <=4 mcg/mL     ciprofloxacin Sensitive <=0.25 mcg/mL     gentamicin Sensitive <=1 mcg/mL     nitrofurantoin Sensitive <=16 mcg/mL     trimethoprim-sulfamethoxazole Sensitive <=20 mcg/mL           ASSESSMENT:  PLAN:    Klebsiella pneumonia sepsis source is urine    On meropenem

## 2019-07-26 NOTE — PROGRESS NOTES
PRN [place]BETTIE Potter - CNP        dextrose 5 % solution  100 mL/hr Intravenous PRN [place] , APRN - CNP        atorvastatin (LIPITOR) tablet 20 mg  20 mg Oral Daily [facility], DO   20 mg at 07/25/19 2148    metoprolol tartrate (LOPRESSOR) tablet 25 mg  25 mg Oral BID [facility], DO   25 mg at 07/25/19 2149    heparin (porcine) injection 5,000 Units  5,000 Units Subcutaneous BID [place]sonBETTIE Potter - CNP   5,000 Units at 07/25/19 2140     Allergies   Allergen Reactions    Pcn [Penicillins]      Active Problems:    Pneumonia  Resolved Problems:    * No resolved hospital problems. *    Blood pressure (!) 149/61, pulse 85, temperature 97.9 °F (36.6 °C), temperature source Oral, resp. rate 18, height 5' 10\" (1.778 m), weight 162 lb (73.5 kg), SpO2 99 %. Subjective:  Symptoms:  Stable. Diet:  Poor intake. Activity level: Impaired due to weakness. Objective:  General Appearance:  Ill-appearing. Vital signs: (most recent): Blood pressure (!) 149/61, pulse 85, temperature 97.9 °F (36.6 °C), temperature source Oral, resp. rate 18, height 5' 10\" (1.778 m), weight 162 lb (73.5 kg), SpO2 99 %. Vital signs are normal.    Lungs: There are wheezes and rhonchi. Heart: S1 normal and S2 normal.    Abdomen: Abdomen is soft. There is no abdominal tenderness. Extremities: Normal range of motion. Neurological: Patient is alert. Skin:  Warm. Results for Wolf Huang (MRN [id number]) as of 7/25/2019 22:48   Ref. Range 7/25/2019 05:26 7/25/2019 07:56 7/25/2019 11:47 7/25/2019 16:32 7/25/2019 20:42   Glucose Latest Ref Range: 70 - 99 mg/dL 247 (H)       POC Glucose Latest Ref Range: 60 - 115 mg/dl  256 (H) 343 (H) 282 (H) 299 (H)     Results for Wolf Huang (MRN [id number]) as of 7/25/2019 22:48   Ref.  Range 7/25/2019 05:26   Sodium Latest Ref Range: 135 - 144 mEq/L 132 (L)   Potassium Latest Ref Range: 3.4 - 4.9 mEq/L 4.0   Chloride Latest Ref Range: 95 - 107 mEq/L 102   CO2

## 2019-07-27 LAB
ANION GAP SERPL CALCULATED.3IONS-SCNC: 12 MEQ/L (ref 9–15)
BLOOD CULTURE, ROUTINE: ABNORMAL
BUN BLDV-MCNC: 68 MG/DL (ref 8–23)
CALCIUM SERPL-MCNC: 8 MG/DL (ref 8.5–9.9)
CHLORIDE BLD-SCNC: 108 MEQ/L (ref 95–107)
CO2: 13 MEQ/L (ref 20–31)
CREAT SERPL-MCNC: 3.04 MG/DL (ref 0.7–1.2)
CULTURE, BLOOD 2: ABNORMAL
CULTURE, BLOOD 2: ABNORMAL
GFR AFRICAN AMERICAN: 23.7
GFR NON-AFRICAN AMERICAN: 19.6
GLUCOSE BLD-MCNC: 175 MG/DL (ref 60–115)
GLUCOSE BLD-MCNC: 244 MG/DL (ref 60–115)
GLUCOSE BLD-MCNC: 76 MG/DL (ref 60–115)
GLUCOSE BLD-MCNC: 78 MG/DL (ref 70–99)
GLUCOSE BLD-MCNC: 85 MG/DL (ref 60–115)
HCT VFR BLD CALC: 26.1 % (ref 42–52)
HEMOGLOBIN: 8.9 G/DL (ref 14–18)
MCH RBC QN AUTO: 30.5 PG (ref 27–31.3)
MCHC RBC AUTO-ENTMCNC: 34.2 % (ref 33–37)
MCV RBC AUTO: 89.3 FL (ref 80–100)
ORGANISM: ABNORMAL
PDW BLD-RTO: 14.6 % (ref 11.5–14.5)
PERFORMED ON: ABNORMAL
PERFORMED ON: ABNORMAL
PERFORMED ON: NORMAL
PERFORMED ON: NORMAL
PLATELET # BLD: 326 K/UL (ref 130–400)
POTASSIUM REFLEX MAGNESIUM: 4 MEQ/L (ref 3.4–4.9)
RBC # BLD: 2.92 M/UL (ref 4.7–6.1)
SODIUM BLD-SCNC: 133 MEQ/L (ref 135–144)
WBC # BLD: 17.5 K/UL (ref 4.8–10.8)

## 2019-07-27 PROCEDURE — 2580000003 HC RX 258: Performed by: INTERNAL MEDICINE

## 2019-07-27 PROCEDURE — 6360000002 HC RX W HCPCS: Performed by: NURSE PRACTITIONER

## 2019-07-27 PROCEDURE — 80048 BASIC METABOLIC PNL TOTAL CA: CPT

## 2019-07-27 PROCEDURE — 87040 BLOOD CULTURE FOR BACTERIA: CPT

## 2019-07-27 PROCEDURE — 1210000000 HC MED SURG R&B

## 2019-07-27 PROCEDURE — 2580000003 HC RX 258: Performed by: NURSE PRACTITIONER

## 2019-07-27 PROCEDURE — 99232 SBSQ HOSP IP/OBS MODERATE 35: CPT | Performed by: INTERNAL MEDICINE

## 2019-07-27 PROCEDURE — 2580000003 HC RX 258: Performed by: EMERGENCY MEDICINE

## 2019-07-27 PROCEDURE — 36415 COLL VENOUS BLD VENIPUNCTURE: CPT

## 2019-07-27 PROCEDURE — 85027 COMPLETE CBC AUTOMATED: CPT

## 2019-07-27 PROCEDURE — 6370000000 HC RX 637 (ALT 250 FOR IP): Performed by: INTERNAL MEDICINE

## 2019-07-27 PROCEDURE — 6360000002 HC RX W HCPCS: Performed by: INTERNAL MEDICINE

## 2019-07-27 RX ADMIN — MEROPENEM 1 G: 1 INJECTION, POWDER, FOR SOLUTION INTRAVENOUS at 16:40

## 2019-07-27 RX ADMIN — ATORVASTATIN CALCIUM 20 MG: 20 TABLET, FILM COATED ORAL at 08:57

## 2019-07-27 RX ADMIN — Medication 10 ML: at 09:01

## 2019-07-27 RX ADMIN — HEPARIN SODIUM 5000 UNITS: 5000 INJECTION, SOLUTION INTRAVENOUS; SUBCUTANEOUS at 21:09

## 2019-07-27 RX ADMIN — SODIUM CHLORIDE: 900 INJECTION INTRAVENOUS at 16:40

## 2019-07-27 RX ADMIN — HEPARIN SODIUM 5000 UNITS: 5000 INJECTION, SOLUTION INTRAVENOUS; SUBCUTANEOUS at 08:57

## 2019-07-27 RX ADMIN — SODIUM CHLORIDE: 900 INJECTION INTRAVENOUS at 06:58

## 2019-07-27 RX ADMIN — METOPROLOL TARTRATE 25 MG: 25 TABLET, FILM COATED ORAL at 21:09

## 2019-07-27 RX ADMIN — MEROPENEM 1 G: 1 INJECTION, POWDER, FOR SOLUTION INTRAVENOUS at 05:03

## 2019-07-27 RX ADMIN — SODIUM CHLORIDE: 900 INJECTION INTRAVENOUS at 08:57

## 2019-07-27 RX ADMIN — Medication 10 ML: at 21:09

## 2019-07-27 RX ADMIN — INSULIN LISPRO 2 UNITS: 100 INJECTION, SOLUTION INTRAVENOUS; SUBCUTANEOUS at 17:25

## 2019-07-27 RX ADMIN — METOPROLOL TARTRATE 25 MG: 25 TABLET, FILM COATED ORAL at 08:57

## 2019-07-27 RX ADMIN — INSULIN LISPRO 4 UNITS: 100 INJECTION, SOLUTION INTRAVENOUS; SUBCUTANEOUS at 12:15

## 2019-07-27 ASSESSMENT — PAIN SCALES - GENERAL: PAINLEVEL_OUTOF10: 0

## 2019-07-27 NOTE — FLOWSHEET NOTE
0915- Up in chair ate all of breakfast except for sausage. Assisted to bathroom, had bowel movement and voided. Assisted to bed. No cough heard at this time.  Lung fields are clear diminished with fine crackles base

## 2019-07-27 NOTE — PROGRESS NOTES
Physician Progress Note    2019   9:24 AM    Name:  Maria Del Rosario Monique  MRN:    [id number]     IP Day: 3     Admit Date: 2019  9:38 AM  PCP: Hyacinth Us MD    Code Status:  Full Code    Subjective:      no new events. Doing better today.      Physical Examination:      Vitals:  BP (!) 170/64   Pulse 81   Temp 97.7 °F (36.5 °C) (Oral)   Resp 18   Ht 5' 10\" (1.778 m)   Wt 162 lb (73.5 kg)   SpO2 100%   BMI 23.24 kg/m²   Temp (24hrs), Av.7 °F (36.5 °C), Min:97.7 °F (36.5 °C), Max:97.7 °F (36.5 °C)      General appearance: alert, cooperative and no distress  Mental Status: normal affect  Lungs: clear to auscultation bilaterally, normal effort  Heart: regular rate and rhythm, no murmur  Abdomen: soft, nontender, nondistended, bowel sounds present, no masses  Extremities: no edema, redness, tenderness in the calves  Skin: no gross lesions, rashes    Data:     Labs:  Recent Labs     19  0518 19  0531   WBC 19.1* 17.5*   HGB 9.2* 8.9*    326     Recent Labs     19  0518 19  0531   * 133*   K 3.8 4.0    108*   CO2 15* 13*   BUN 66* 68*   CREATININE 2.70* 3.04*   GLUCOSE 102* 78     Recent Labs     19  1030 19  0526   AST 22 73*   ALT 26 46*   BILITOT 0.3 0.3   ALKPHOS 120* 106*       Current Facility-Administered Medications   Medication Dose Route Frequency Provider Last Rate Last Dose    insulin glargine (LANTUS) injection vial 25 Units  25 Units Subcutaneous Nightly CORNELIO Watson        meropenem (MERREM) 1 g in sodium chloride 0.9 % 100 mL IVPB (mini-bag)  1 g Intravenous Q12H Daily Mendoza MD   Stopped at 19 0552    insulin lispro (HUMALOG) injection vial 14 Units  14 Units Subcutaneous TID  Orlando Holly MD   14 Units at 19 1743    0.9 % sodium chloride infusion   Intravenous Continuous Swathi Kinsey  mL/hr at 19 0849      0.9 % sodium chloride infusion   Intravenous Continuous Swathi Kinsey  mL/hr at 07/27/19 0857      sodium chloride flush 0.9 % injection 10 mL  10 mL Intravenous 2 times per day Felizardo Lofty, APRN - CNP   10 mL at 07/27/19 0901    sodium chloride flush 0.9 % injection 10 mL  10 mL Intravenous PRN Felizardo Lofty, APRN - CNP        magnesium hydroxide (MILK OF MAGNESIA) 400 MG/5ML suspension 30 mL  30 mL Oral Daily PRN Felizardo Lofty, APRN - CNP        ondansetron (ZOFRAN) injection 4 mg  4 mg Intravenous Q6H PRN Felizardo Lofty, APRN - CNP        albuterol (PROVENTIL) nebulizer solution 2.5 mg  2.5 mg Nebulization Q2H PRN Felizardo Lofty, APRN - CNP        acetaminophen (TYLENOL) tablet 650 mg  650 mg Oral Q4H PRN Felizardo Lofty, APRN - CNP        insulin lispro (HUMALOG) injection vial 0-12 Units  0-12 Units Subcutaneous TID WC Felizardo Taniafty, APRN - CNP   2 Units at 07/26/19 1233    insulin lispro (HUMALOG) injection vial 0-6 Units  0-6 Units Subcutaneous Nightly Felizardo Lofty, APRN - CNP   3 Units at 07/25/19 2139    glucose (GLUTOSE) 40 % oral gel 15 g  15 g Oral PRN Felizardo Lofty, APRN - CNP        dextrose 50 % IV solution  12.5 g Intravenous PRN Felizardo Lofty, APRN - CNP        glucagon (rDNA) injection 1 mg  1 mg Intramuscular PRN Felizardo Lofty, APRN - CNP        dextrose 5 % solution  100 mL/hr Intravenous PRN Felizardo Lofty, APRN - CNP        atorvastatin (LIPITOR) tablet 20 mg  20 mg Oral Daily Roxanna Sofiakenhead, DO   20 mg at 07/27/19 0857    metoprolol tartrate (LOPRESSOR) tablet 25 mg  25 mg Oral BID Roxanna Birkenhead, DO   25 mg at 07/27/19 0857    heparin (porcine) injection 5,000 Units  5,000 Units Subcutaneous BID Felizardo Lofty, APRN - CNP   5,000 Units at 07/27/19 0857     Assessment and Plan: Active problems:    1. Sepsis, present on admission due to UTI  And possible PNA -resume IV antibiotics, follow-up blood culture. ID on consult   2. Hyponatremia due to dehydration-resume IV fluids  3. Deconditioning-PT/OT  4.   Acute metabolic encephalopathy

## 2019-07-28 LAB
ANION GAP SERPL CALCULATED.3IONS-SCNC: 12 MEQ/L (ref 9–15)
BUN BLDV-MCNC: 68 MG/DL (ref 8–23)
CALCIUM SERPL-MCNC: 8 MG/DL (ref 8.5–9.9)
CHLORIDE BLD-SCNC: 108 MEQ/L (ref 95–107)
CO2: 12 MEQ/L (ref 20–31)
CREAT SERPL-MCNC: 2.82 MG/DL (ref 0.7–1.2)
GFR AFRICAN AMERICAN: 25.8
GFR NON-AFRICAN AMERICAN: 21.3
GLUCOSE BLD-MCNC: 101 MG/DL (ref 70–99)
GLUCOSE BLD-MCNC: 107 MG/DL (ref 60–115)
GLUCOSE BLD-MCNC: 123 MG/DL (ref 60–115)
GLUCOSE BLD-MCNC: 136 MG/DL (ref 60–115)
GLUCOSE BLD-MCNC: 185 MG/DL (ref 60–115)
HCT VFR BLD CALC: 28.9 % (ref 42–52)
HEMOGLOBIN: 9.3 G/DL (ref 14–18)
MCH RBC QN AUTO: 29.7 PG (ref 27–31.3)
MCHC RBC AUTO-ENTMCNC: 32.3 % (ref 33–37)
MCV RBC AUTO: 92 FL (ref 80–100)
PDW BLD-RTO: 15.3 % (ref 11.5–14.5)
PERFORMED ON: ABNORMAL
PERFORMED ON: NORMAL
PLATELET # BLD: 371 K/UL (ref 130–400)
POTASSIUM REFLEX MAGNESIUM: 4.2 MEQ/L (ref 3.4–4.9)
RBC # BLD: 3.14 M/UL (ref 4.7–6.1)
SODIUM BLD-SCNC: 132 MEQ/L (ref 135–144)
WBC # BLD: 11.8 K/UL (ref 4.8–10.8)

## 2019-07-28 PROCEDURE — 6360000002 HC RX W HCPCS: Performed by: INTERNAL MEDICINE

## 2019-07-28 PROCEDURE — 1210000000 HC MED SURG R&B

## 2019-07-28 PROCEDURE — 2580000003 HC RX 258: Performed by: NURSE PRACTITIONER

## 2019-07-28 PROCEDURE — 6360000002 HC RX W HCPCS: Performed by: NURSE PRACTITIONER

## 2019-07-28 PROCEDURE — 36415 COLL VENOUS BLD VENIPUNCTURE: CPT

## 2019-07-28 PROCEDURE — 6370000000 HC RX 637 (ALT 250 FOR IP): Performed by: INTERNAL MEDICINE

## 2019-07-28 PROCEDURE — 99232 SBSQ HOSP IP/OBS MODERATE 35: CPT | Performed by: PHYSICIAN ASSISTANT

## 2019-07-28 PROCEDURE — 85027 COMPLETE CBC AUTOMATED: CPT

## 2019-07-28 PROCEDURE — 80048 BASIC METABOLIC PNL TOTAL CA: CPT

## 2019-07-28 PROCEDURE — 2580000003 HC RX 258: Performed by: INTERNAL MEDICINE

## 2019-07-28 PROCEDURE — 99232 SBSQ HOSP IP/OBS MODERATE 35: CPT | Performed by: INTERNAL MEDICINE

## 2019-07-28 PROCEDURE — 2580000003 HC RX 258: Performed by: EMERGENCY MEDICINE

## 2019-07-28 PROCEDURE — 6370000000 HC RX 637 (ALT 250 FOR IP): Performed by: PHYSICIAN ASSISTANT

## 2019-07-28 RX ORDER — SODIUM CHLORIDE 0.9 % (FLUSH) 0.9 %
10 SYRINGE (ML) INJECTION PRN
Status: DISCONTINUED | OUTPATIENT
Start: 2019-07-28 | End: 2019-07-29 | Stop reason: HOSPADM

## 2019-07-28 RX ORDER — LIDOCAINE HYDROCHLORIDE 20 MG/ML
5 INJECTION, SOLUTION INFILTRATION; PERINEURAL ONCE
Status: COMPLETED | OUTPATIENT
Start: 2019-07-28 | End: 2019-07-29

## 2019-07-28 RX ORDER — SODIUM CHLORIDE 0.9 % (FLUSH) 0.9 %
10 SYRINGE (ML) INJECTION EVERY 12 HOURS SCHEDULED
Status: DISCONTINUED | OUTPATIENT
Start: 2019-07-28 | End: 2019-07-29 | Stop reason: HOSPADM

## 2019-07-28 RX ORDER — SODIUM CHLORIDE 9 MG/ML
250 INJECTION, SOLUTION INTRAVENOUS ONCE
Status: COMPLETED | OUTPATIENT
Start: 2019-07-28 | End: 2019-07-29

## 2019-07-28 RX ORDER — INSULIN GLARGINE 100 [IU]/ML
15 INJECTION, SOLUTION SUBCUTANEOUS NIGHTLY
Status: DISCONTINUED | OUTPATIENT
Start: 2019-07-28 | End: 2019-07-29

## 2019-07-28 RX ADMIN — SODIUM CHLORIDE: 900 INJECTION INTRAVENOUS at 18:02

## 2019-07-28 RX ADMIN — INSULIN LISPRO 2 UNITS: 100 INJECTION, SOLUTION INTRAVENOUS; SUBCUTANEOUS at 13:01

## 2019-07-28 RX ADMIN — Medication 10 ML: at 09:26

## 2019-07-28 RX ADMIN — METOPROLOL TARTRATE 25 MG: 25 TABLET, FILM COATED ORAL at 21:29

## 2019-07-28 RX ADMIN — INSULIN GLARGINE 15 UNITS: 100 INJECTION, SOLUTION SUBCUTANEOUS at 21:29

## 2019-07-28 RX ADMIN — METOPROLOL TARTRATE 25 MG: 25 TABLET, FILM COATED ORAL at 09:26

## 2019-07-28 RX ADMIN — MEROPENEM 500 MG: 500 INJECTION, POWDER, FOR SOLUTION INTRAVENOUS at 21:29

## 2019-07-28 RX ADMIN — HEPARIN SODIUM 5000 UNITS: 5000 INJECTION, SOLUTION INTRAVENOUS; SUBCUTANEOUS at 21:47

## 2019-07-28 RX ADMIN — MEROPENEM 500 MG: 500 INJECTION, POWDER, FOR SOLUTION INTRAVENOUS at 05:40

## 2019-07-28 RX ADMIN — ATORVASTATIN CALCIUM 20 MG: 20 TABLET, FILM COATED ORAL at 09:25

## 2019-07-28 RX ADMIN — HEPARIN SODIUM 5000 UNITS: 5000 INJECTION, SOLUTION INTRAVENOUS; SUBCUTANEOUS at 09:28

## 2019-07-28 RX ADMIN — SODIUM CHLORIDE: 900 INJECTION INTRAVENOUS at 04:04

## 2019-07-28 ASSESSMENT — PAIN SCALES - GENERAL: PAINLEVEL_OUTOF10: 0

## 2019-07-28 NOTE — CARE COORDINATION
7/28/19 I CALLED THE WIFE ADAM AND DISCUSSED WITH HER PT'S NEED FOR IV ABX INVANZ AND PICC LINE NEED. SHE STATED THE PICC LINE WAS OKAY WITH HER. SHE IS VERY UPSET THAT THE PT NEEDS THE IV ABX. SHE STATED SHE RATHER DO HHC BUT NEEDS TO KNOW THE COST OF [facility] NURSE AND HOME IV ABX. SHE STATED  THIS WILL BE DIFFICULT FOR HER  BUT FEELS SHE CAN LEARN IF OK WITH DAUGHTER TO HELP BUT HER DAUGHTER WORKS AND WILL NOT BE ABLE TO HELP TILL AFTER 19:00. SHE STATED HER SOCIAL SECURITY INCOME  DOLLARS AND HER SPOUSE IS 1700 DOLLARS PER MONTH. SHE HAS 32:00 IN THE BANK. SHE JUST RECEIVED A BILL FROM THE [facility] FOR OVER 800 DOLLARS SHE CANNOT PAY AND HER RENT IS DUE ON THE THRD WHICH SHE WILL HVE ON THE THIRD. SHE STATED SHE SPOKE WITH HER INSURANCE CO AND IF THE PT NEEDS A SNF SHE WOULD HAVE TO  DOLLARS. THIS IS AL  CIARA HARD FOR HER SHE STATES. IV CHECK SENT.

## 2019-07-28 NOTE — FLOWSHEET NOTE
RN notified Dr. Gray via [employer] of patients blood glucose of 76 because the patient had a nightly dose of Lantus scheduled. Per the physician this dose was held. See MAR.

## 2019-07-28 NOTE — HOME CARE
IV BENEFIT REQUEST FORM    FAX FROM: [facility]                        [address], [facility]    REQUESTED BY: Electronically signed by Angelo Hadley RN on 2019 at 5:31 PM                                               RN/C3: PHONE: [phone])     DATE:/TIME OF REQUEST: 19  TIME: 5:31 PM      TO: [address] [facility]      FAX TO: [phone]    PHONE: [phone]     THIS PATIENT HAS BEEN IDENTIFIED TO POSSIBLY NEED LONG TERM IV'S. PLEASE CHECK INSURANCE COVERAGE FOR THE FOLLOWING PT/DRUGS. PATIENT'S NAME: Sara [facility]                              ROOM: [place] [id number]   PATIENT'S : 1932  PATIENT ADDRESS: [address] [place] [address]  SSN:    ()     PAYOR NAME:  Payor: Mortimer Manly / Plan: Reema Calixto ESSENTIAL/PLUS / Product Type: *No Product type* /     DRUG: (INVANZ)                         DOSE: (750 MG)            FREQUENCY: Q (24) HR    __________ CHECK HERE IF PT HAS NO INSURANCE AND REQUESTING SELF PAY COST. *IF [facility] IS NOT A PROVIDER FOR THIS PATIENT, PLEASE FORWARD INFO VIA FAX TO [facility] @ [phone],(PHONE NUMBER: [phone]) TO RUN BENEFIT VERIFICATION AND NOTIFY THE ABOVE C3 OF THIS PLAN. (FAX FACE SHEET WITH DEMOGRAPHICS AND INSURANCE INFO WITH THIS FORM.)  PLEASE FAX BENEFIT INFO TO: THE [phone]    This message is intended only for the use of the individual or entity to which it is addressed and may contain information that is privileged, confidential, and exempt from disclosure under applicable law. If the reader of the notice is not intended recipient of the employee/agent responsible for delivering the message to the intended recipient, you are hereby notified than any dissemination, distribution or copying of this communication is strictly prohibited.  Please contact the sender for further instructions on handling the information.

## 2019-07-28 NOTE — PROGRESS NOTES
Subcutaneous Nightly    insulin lispro  14 Units Subcutaneous TID     sodium chloride flush  10 mL Intravenous 2 times per day    insulin lispro  0-12 Units Subcutaneous TID WC    insulin lispro  0-6 Units Subcutaneous Nightly    atorvastatin  20 mg Oral Daily    metoprolol tartrate  25 mg Oral BID    heparin (porcine)  5,000 Units Subcutaneous BID     Continuous Infusions:   sodium chloride      sodium chloride 100 mL/hr at 07/25/19 0849    sodium chloride 100 mL/hr at 07/28/19 0404    dextrose         Objective:   Vitals: BP (!) 152/65   Pulse 80   Temp 98.4 °F (36.9 °C) (Oral)   Resp 18   Ht 5' 10\" (1.778 m)   Wt 162 lb (73.5 kg)   SpO2 100%   BMI 23.24 kg/m²    Wt Readings from Last 3 Encounters:   07/24/19 162 lb (73.5 kg)        General appearance: appears older than stated age, cooperative, fatigued and no distress  Skin: Skin color, texture, turgor normal. No rashes or lesions. Neck: no lymphadenopathy  Lungs: clear to auscultation bilaterally  Heart: regular rate and rhythm, S1, S2 normal, no murmur, click, rub or gallop  Abdomen: soft, non-tender. Bowel sounds normal. No masses,  no organomegaly.   Extremities: extremities normal, atraumatic, no cyanosis or edema    Patient Active Problem List:     Pneumonia     Uncontrolled type 2 diabetes mellitus with hyperglycemia [facility])            Electronically signed by CORNELIO Nguyen on 7/28/2019 at 2:04 PM

## 2019-07-29 ENCOUNTER — APPOINTMENT (OUTPATIENT)
Dept: INTERVENTIONAL RADIOLOGY/VASCULAR | Age: 84
DRG: 871 | End: 2019-07-29
Payer: MEDICARE

## 2019-07-29 VITALS
RESPIRATION RATE: 18 BRPM | HEIGHT: 70 IN | BODY MASS INDEX: 23.19 KG/M2 | TEMPERATURE: 98.1 F | WEIGHT: 162 LBS | HEART RATE: 78 BPM | DIASTOLIC BLOOD PRESSURE: 115 MMHG | SYSTOLIC BLOOD PRESSURE: 190 MMHG | OXYGEN SATURATION: 100 %

## 2019-07-29 LAB
ANION GAP SERPL CALCULATED.3IONS-SCNC: 12 MEQ/L (ref 9–15)
BUN BLDV-MCNC: 71 MG/DL (ref 8–23)
CALCIUM SERPL-MCNC: 8 MG/DL (ref 8.5–9.9)
CHLORIDE BLD-SCNC: 111 MEQ/L (ref 95–107)
CO2: 14 MEQ/L (ref 20–31)
CREAT SERPL-MCNC: 2.59 MG/DL (ref 0.7–1.2)
CREATININE URINE: 26.2 MG/DL
GFR AFRICAN AMERICAN: 28.5
GFR NON-AFRICAN AMERICAN: 23.5
GLUCOSE BLD-MCNC: 104 MG/DL (ref 60–115)
GLUCOSE BLD-MCNC: 199 MG/DL (ref 60–115)
GLUCOSE BLD-MCNC: 249 MG/DL (ref 60–115)
GLUCOSE BLD-MCNC: 95 MG/DL (ref 70–99)
HCT VFR BLD CALC: 26 % (ref 42–52)
HEMOGLOBIN: 8.6 G/DL (ref 14–18)
MCH RBC QN AUTO: 30 PG (ref 27–31.3)
MCHC RBC AUTO-ENTMCNC: 33.2 % (ref 33–37)
MCV RBC AUTO: 90.4 FL (ref 80–100)
OSMOLALITY URINE: 265 MOSM/KG (ref 300–900)
PDW BLD-RTO: 14.9 % (ref 11.5–14.5)
PERFORMED ON: ABNORMAL
PERFORMED ON: ABNORMAL
PERFORMED ON: NORMAL
PLATELET # BLD: 407 K/UL (ref 130–400)
POTASSIUM REFLEX MAGNESIUM: 4.6 MEQ/L (ref 3.4–4.9)
RBC # BLD: 2.88 M/UL (ref 4.7–6.1)
SODIUM BLD-SCNC: 137 MEQ/L (ref 135–144)
SODIUM URINE: 63 MEQ/L
WBC # BLD: 12 K/UL (ref 4.8–10.8)

## 2019-07-29 PROCEDURE — 2580000003 HC RX 258: Performed by: NURSE PRACTITIONER

## 2019-07-29 PROCEDURE — 36415 COLL VENOUS BLD VENIPUNCTURE: CPT

## 2019-07-29 PROCEDURE — 6360000002 HC RX W HCPCS: Performed by: INTERNAL MEDICINE

## 2019-07-29 PROCEDURE — 02HV33Z INSERTION OF INFUSION DEVICE INTO SUPERIOR VENA CAVA, PERCUTANEOUS APPROACH: ICD-10-PCS | Performed by: RADIOLOGY

## 2019-07-29 PROCEDURE — 2709999900 IR PICC WO SQ PORT/PUMP > 5 YEARS

## 2019-07-29 PROCEDURE — 85027 COMPLETE CBC AUTOMATED: CPT

## 2019-07-29 PROCEDURE — 2500000003 HC RX 250 WO HCPCS: Performed by: INTERNAL MEDICINE

## 2019-07-29 PROCEDURE — 6370000000 HC RX 637 (ALT 250 FOR IP): Performed by: INTERNAL MEDICINE

## 2019-07-29 PROCEDURE — 2580000003 HC RX 258: Performed by: INTERNAL MEDICINE

## 2019-07-29 PROCEDURE — 99232 SBSQ HOSP IP/OBS MODERATE 35: CPT | Performed by: PHYSICIAN ASSISTANT

## 2019-07-29 PROCEDURE — 83935 ASSAY OF URINE OSMOLALITY: CPT

## 2019-07-29 PROCEDURE — 2580000003 HC RX 258: Performed by: EMERGENCY MEDICINE

## 2019-07-29 PROCEDURE — 99232 SBSQ HOSP IP/OBS MODERATE 35: CPT | Performed by: INTERNAL MEDICINE

## 2019-07-29 PROCEDURE — 97535 SELF CARE MNGMENT TRAINING: CPT

## 2019-07-29 PROCEDURE — 36573 INSJ PICC RS&I 5 YR+: CPT | Performed by: RADIOLOGY

## 2019-07-29 PROCEDURE — 80048 BASIC METABOLIC PNL TOTAL CA: CPT

## 2019-07-29 PROCEDURE — 84300 ASSAY OF URINE SODIUM: CPT

## 2019-07-29 PROCEDURE — 82570 ASSAY OF URINE CREATININE: CPT

## 2019-07-29 PROCEDURE — 97116 GAIT TRAINING THERAPY: CPT

## 2019-07-29 RX ORDER — PEN NEEDLE, DIABETIC 31 G X1/4"
1 NEEDLE, DISPOSABLE MISCELLANEOUS
Qty: 150 EACH | Refills: 3 | Status: ON HOLD | OUTPATIENT
Start: 2019-07-29 | End: 2020-12-23 | Stop reason: HOSPADM

## 2019-07-29 RX ORDER — INSULIN GLARGINE 100 [IU]/ML
10 INJECTION, SOLUTION SUBCUTANEOUS NIGHTLY
Status: DISCONTINUED | OUTPATIENT
Start: 2019-07-29 | End: 2019-07-29 | Stop reason: HOSPADM

## 2019-07-29 RX ORDER — AMLODIPINE BESYLATE 10 MG/1
10 TABLET ORAL DAILY
Qty: 30 TABLET | Refills: 3 | Status: ON HOLD | OUTPATIENT
Start: 2019-07-29 | End: 2020-12-18

## 2019-07-29 RX ORDER — CIPROFLOXACIN 500 MG/1
500 TABLET, FILM COATED ORAL EVERY 12 HOURS SCHEDULED
Status: DISCONTINUED | OUTPATIENT
Start: 2019-07-29 | End: 2019-07-29

## 2019-07-29 RX ORDER — CIPROFLOXACIN 250 MG/1
250 TABLET, FILM COATED ORAL 2 TIMES DAILY
Qty: 40 TABLET | Refills: 0 | Status: SHIPPED | OUTPATIENT
Start: 2019-07-29 | End: 2019-08-18

## 2019-07-29 RX ORDER — CIPROFLOXACIN 500 MG/1
250 TABLET, FILM COATED ORAL EVERY 12 HOURS SCHEDULED
Status: DISCONTINUED | OUTPATIENT
Start: 2019-07-29 | End: 2019-07-29 | Stop reason: HOSPADM

## 2019-07-29 RX ADMIN — METOPROLOL TARTRATE 25 MG: 25 TABLET, FILM COATED ORAL at 09:01

## 2019-07-29 RX ADMIN — LIDOCAINE HYDROCHLORIDE 5 ML: 20 INJECTION, SOLUTION INFILTRATION; PERINEURAL at 09:50

## 2019-07-29 RX ADMIN — Medication 10 ML: at 11:02

## 2019-07-29 RX ADMIN — INSULIN LISPRO 4 UNITS: 100 INJECTION, SOLUTION INTRAVENOUS; SUBCUTANEOUS at 17:56

## 2019-07-29 RX ADMIN — SODIUM CHLORIDE 250 ML: 9 INJECTION, SOLUTION INTRAVENOUS at 09:53

## 2019-07-29 RX ADMIN — ATORVASTATIN CALCIUM 20 MG: 20 TABLET, FILM COATED ORAL at 09:01

## 2019-07-29 RX ADMIN — INSULIN LISPRO 2 UNITS: 100 INJECTION, SOLUTION INTRAVENOUS; SUBCUTANEOUS at 13:15

## 2019-07-29 RX ADMIN — MEROPENEM 500 MG: 500 INJECTION, POWDER, FOR SOLUTION INTRAVENOUS at 11:01

## 2019-07-29 RX ADMIN — SODIUM CHLORIDE: 900 INJECTION INTRAVENOUS at 05:46

## 2019-07-29 ASSESSMENT — PAIN SCALES - GENERAL
PAINLEVEL_OUTOF10: 0
PAINLEVEL_OUTOF10: 0

## 2019-07-29 ASSESSMENT — ENCOUNTER SYMPTOMS
GASTROINTESTINAL NEGATIVE: 1
COUGH: 0

## 2019-07-29 NOTE — PROGRESS NOTES
Score : 18      Therapy Time   Individual   Time In 1350   Time Out 1413   Minutes 23      bm/Trsf - 10 mins  Gait - 13 mins       Mina Velez PTA, 07/29/19 at 2:23 PM

## 2019-07-29 NOTE — PROGRESS NOTES
Department of Internal Medicine  Progress Note      SUBJECTIVE: NO complains . Doing better  No acute events overnight.        ROS:  All 12 systems reviewed and negative except mentioned in HPI and Assessment and plan    MEDICATIONS:  Current Facility-Administered Medications   Medication Dose Route Frequency Provider Last Rate Last Dose    sodium chloride flush 0.9 % injection 10 mL  10 mL Intravenous 2 times per day Collin López DO   Stopped at 07/29/19 1101    sodium chloride flush 0.9 % injection 10 mL  10 mL Intravenous PRN Collin López DO        insulin glargine (LANTUS) injection vial 15 Units  15 Units Subcutaneous Nightly CORNELIO Magaña   15 Units at 07/28/19 2129    insulin lispro (HUMALOG) injection vial 5 Units  5 Units Subcutaneous TID WC CORNELIO Magaña   Stopped at 07/29/19 0901    meropenem (MERREM) 500 mg IVPB minibag  500 mg Intravenous Q12H Annie Villalpando  mL/hr at 07/29/19 1101 500 mg at 07/29/19 1101    0.9 % sodium chloride infusion   Intravenous Continuous Safia Gregg  mL/hr at 07/25/19 0849      0.9 % sodium chloride infusion   Intravenous Continuous Safia Gregg  mL/hr at 07/29/19 0546      sodium chloride flush 0.9 % injection 10 mL  10 mL Intravenous 2 times per day Keyana Gal, APRN - CNP   10 mL at 07/29/19 1102    sodium chloride flush 0.9 % injection 10 mL  10 mL Intravenous PRN Keyana Gal, APRN - CNP        magnesium hydroxide (MILK OF MAGNESIA) 400 MG/5ML suspension 30 mL  30 mL Oral Daily PRN Keyana Gal, APRN - CNP        ondansetron (ZOFRAN) injection 4 mg  4 mg Intravenous Q6H PRN Kafilat Kelani, APRN - CNP        albuterol (PROVENTIL) nebulizer solution 2.5 mg  2.5 mg Nebulization Q2H PRN Keyana Gal, APRN - CNP        acetaminophen (TYLENOL) tablet 650 mg  650 mg Oral Q4H PRN Keyana Gal, APRN - CNP        insulin lispro (HUMALOG) injection vial 0-12 Units  0-12 Units Subcutaneous TID HERNANDEZ Arriaga

## 2019-07-29 NOTE — PROGRESS NOTES
Infectious Disease     Patient Name: Zia Mancera  Date: 7/29/2019  YOB: 1932  Medical Record Number: [id number]    Klebsiella pneumonia sepsis source is urine  Gram-negative sepsis  Urinary source        Patient presents fever 101 °F with cough productive shortness of breath  Maik's  Noticed a sacral wound 3 days prior to admission with drainage  Increasing fatigue  Decreased activity of daily living        Urine cultures growing group B streptococcus along with 100,000 colonies of a gram-negative corey  Is positive with more than 100 white cells per high-power field      Review of Systems   Constitutional: Negative for chills, diaphoresis, fatigue and fever. HENT: Negative. Respiratory: Negative for cough. Cardiovascular: Negative. Gastrointestinal: Negative. Genitourinary: Negative. Musculoskeletal: Negative. Neurological: Negative. History reviewed. No pertinent family history. BP (!) 190/115   Pulse 78   Temp 98.1 °F (36.7 °C)   Resp 18   Ht 5' 10\" (1.778 m)   Wt 162 lb (73.5 kg)   SpO2 100%   BMI 23.24 kg/m²        Physical Exam   Constitutional: He appears well-developed. HENT:   Head: Normocephalic. Eyes: Pupils are equal, round, and reactive to light. Neck: Normal range of motion. Neck supple. Cardiovascular: Intact distal pulses. No murmur heard. Pulmonary/Chest: Effort normal and breath sounds normal. No respiratory distress. He has no wheezes. He has no rales. He exhibits no tenderness. Abdominal: Soft. Bowel sounds are normal. He exhibits no distension and no mass. There is no splenomegaly or hepatomegaly. There is no tenderness. There is no rebound, no guarding and no CVA tenderness. Neurological: He is alert.          Susceptibility     Klebsiella pneumoniae ssp pneumoniae (1)     Antibiotic Interpretation PATTIE Status    amoxicillin-clavulanate Sensitive 4 mcg/mL     ceFAZolin Sensitive <=4 mcg/mL     ciprofloxacin Sensitive <=0.25 mcg/mL gentamicin Sensitive <=1 mcg/mL     nitrofurantoin Sensitive <=16 mcg/mL     trimethoprim-sulfamethoxazole Sensitive <=20 mcg/mL           ASSESSMENT:  PLAN:    Klebsiella pneumonia sepsis source is urine    On meropenem    Okay to discharge on Cipro for 20 days

## 2019-07-30 ENCOUNTER — TELEPHONE (OUTPATIENT)
Dept: ENDOCRINOLOGY | Age: 84
End: 2019-07-30

## 2019-07-30 RX ORDER — INSULIN LISPRO 100 [IU]/ML
15 INJECTION, SUSPENSION SUBCUTANEOUS 2 TIMES DAILY WITH MEALS
Qty: 5 PEN | Refills: 3 | Status: ON HOLD | OUTPATIENT
Start: 2019-07-30 | End: 2020-12-23 | Stop reason: HOSPADM

## 2019-07-30 NOTE — TELEPHONE ENCOUNTER
Franklin Orellana with [facility] calls in today to get clarification on this patients insulin orders. Also patient had blood sugar of 383 at noon and they gave him 10 units of humalog and then after that it was still 328 at 1:15. Should they give him more insulin? Please call Franklin Orellana at [phone].

## 2019-07-31 NOTE — PROGRESS NOTES
Physical Therapy  Fac[facility] N519/W124-01  Physical Therapy Discharge      NAME: Leana Nix    : 1932 (80 y.o.)  MRN: [id number]    Account: [de-identified]  Gender: male      Patient has been discharged from acute care hospital. DC patient from current PT program.      Electronically signed by Jennifer Kuo PT on 19 at 4:23 PM

## 2019-08-01 LAB — BLOOD CULTURE, ROUTINE: NORMAL

## 2019-08-24 ENCOUNTER — HOSPITAL ENCOUNTER (EMERGENCY)
Age: 84
Discharge: HOME | End: 2019-08-24
Attending: EMERGENCY MEDICINE
Payer: MEDICARE

## 2019-08-24 VITALS
DIASTOLIC BLOOD PRESSURE: 68 MMHG | WEIGHT: 171 LBS | HEIGHT: 70 IN | OXYGEN SATURATION: 100 % | BODY MASS INDEX: 24.48 KG/M2 | HEART RATE: 78 BPM | TEMPERATURE: 98.6 F | RESPIRATION RATE: 20 BRPM | SYSTOLIC BLOOD PRESSURE: 124 MMHG

## 2019-08-24 DIAGNOSIS — N17.0 ACUTE RENAL FAILURE WITH ACUTE TUBULAR NECROSIS SUPERIMPOSED ON STAGE 1 CHRONIC KIDNEY DISEASE (HCC): ICD-10-CM

## 2019-08-24 DIAGNOSIS — E87.5 CHRONIC HYPERKALEMIA: Primary | ICD-10-CM

## 2019-08-24 DIAGNOSIS — N18.1 ACUTE RENAL FAILURE WITH ACUTE TUBULAR NECROSIS SUPERIMPOSED ON STAGE 1 CHRONIC KIDNEY DISEASE (HCC): ICD-10-CM

## 2019-08-24 LAB
ALBUMIN SERPL-MCNC: 3.6 G/DL (ref 3.5–4.6)
ALP BLD-CCNC: 117 U/L (ref 35–104)
ALT SERPL-CCNC: 16 U/L (ref 0–41)
ANION GAP SERPL CALCULATED.3IONS-SCNC: 11 MEQ/L (ref 9–15)
AST SERPL-CCNC: 17 U/L (ref 0–40)
BILIRUB SERPL-MCNC: <0.2 MG/DL (ref 0.2–0.7)
BUN BLDV-MCNC: 35 MG/DL (ref 8–23)
CALCIUM SERPL-MCNC: 9.2 MG/DL (ref 8.5–9.9)
CHLORIDE BLD-SCNC: 105 MEQ/L (ref 95–107)
CO2: 22 MEQ/L (ref 20–31)
CREAT SERPL-MCNC: 2.32 MG/DL (ref 0.7–1.2)
EKG ATRIAL RATE: 56 BPM
EKG P AXIS: 80 DEGREES
EKG P-R INTERVAL: 174 MS
EKG Q-T INTERVAL: 440 MS
EKG QRS DURATION: 130 MS
EKG QTC CALCULATION (BAZETT): 424 MS
EKG R AXIS: 62 DEGREES
EKG T AXIS: 64 DEGREES
EKG VENTRICULAR RATE: 56 BPM
GFR AFRICAN AMERICAN: 32.3
GFR NON-AFRICAN AMERICAN: 26.7
GLOBULIN: 5.1 G/DL (ref 2.3–3.5)
GLUCOSE BLD-MCNC: 163 MG/DL (ref 70–99)
POTASSIUM SERPL-SCNC: 6.3 MEQ/L (ref 3.4–4.9)
SODIUM BLD-SCNC: 138 MEQ/L (ref 135–144)
TOTAL PROTEIN: 8.7 G/DL (ref 6.3–8)

## 2019-08-24 PROCEDURE — 99283 EMERGENCY DEPT VISIT LOW MDM: CPT

## 2019-08-24 PROCEDURE — 93005 ELECTROCARDIOGRAM TRACING: CPT | Performed by: EMERGENCY MEDICINE

## 2019-08-24 PROCEDURE — 80053 COMPREHEN METABOLIC PANEL: CPT

## 2019-08-24 ASSESSMENT — ENCOUNTER SYMPTOMS
ABDOMINAL PAIN: 0
EYE DISCHARGE: 0
FACIAL SWELLING: 0
VOMITING: 0
DIARRHEA: 0
COLOR CHANGE: 0
RHINORRHEA: 0
PHOTOPHOBIA: 0
SHORTNESS OF BREATH: 0
ABDOMINAL DISTENTION: 0
SINUS PRESSURE: 0
COUGH: 0
NAUSEA: 0
EYE PAIN: 0
ANAL BLEEDING: 0
BACK PAIN: 0
WHEEZING: 0
CHEST TIGHTNESS: 0
EYE REDNESS: 0
BLOOD IN STOOL: 0
EYE ITCHING: 0
VOICE CHANGE: 0
SORE THROAT: 0
CONSTIPATION: 0
STRIDOR: 0
TROUBLE SWALLOWING: 0
CHOKING: 0

## 2019-08-24 NOTE — ED PROVIDER NOTES
well-nourished. No distress. HENT:   Head: Normocephalic and atraumatic. Right Ear: External ear normal.   Left Ear: External ear normal.   Nose: Nose normal.   Mouth/Throat: Oropharynx is clear and moist. No oropharyngeal exudate. Eyes: Pupils are equal, round, and reactive to light. Conjunctivae and EOM are normal. Right eye exhibits no discharge. Left eye exhibits no discharge. No scleral icterus. Neck: Normal range of motion. Neck supple. No JVD present. No tracheal deviation present. No thyromegaly present. Cardiovascular: Normal rate, regular rhythm, normal heart sounds and intact distal pulses. Exam reveals no gallop and no friction rub. No murmur heard. Pulmonary/Chest: Effort normal and breath sounds normal. No stridor. No respiratory distress. He has no wheezes. He has no rales. He exhibits no tenderness. Abdominal: Soft. Bowel sounds are normal. He exhibits no distension and no mass. There is no tenderness. There is no rebound and no guarding. Musculoskeletal: Normal range of motion. He exhibits no edema or tenderness. Lymphadenopathy:     He has no cervical adenopathy. Neurological: He is alert and oriented to person, place, and time. He has normal reflexes. He displays normal reflexes. No cranial nerve deficit. He exhibits normal muscle tone. Coordination normal.   Skin: Skin is warm and dry. No rash noted. He is not diaphoretic. No erythema. No pallor. Psychiatric: He has a normal mood and affect. His behavior is normal. Judgment and thought content normal.   Nursing note and vitals reviewed. DIAGNOSTIC RESULTS     EKG: All EKG's are interpreted by the Emergency Department Physician who either signs or Co-signs this chart in the absence of a cardiologist.    Lead EKG was performed which shows sinus bradycardia with a right bundle branch block. There is no ST segment elevation or depression in any limb lead or precordial lead. There is no ectopy.   Summary abnormal EKG discharge soon as possible. Twelve-lead EKG was performed which shows no acute findings and no hyper-peaked T waves so I assume this potassium elevation is chronic. MDM      CRITICAL CARE TIME     CONSULTS:  None    PROCEDURES:  Unless otherwise noted below, none     Procedures    FINAL IMPRESSION      1. Chronic hyperkalemia    2. Acute renal failure with acute tubular necrosis superimposed on stage 1 chronic kidney disease [facility])          DISPOSITION/PLAN   DISPOSITION Decision To Discharge 08/24/2019 12:52:59 PM      PATIENT REFERRED TO:  Laure Hoffman MD  [facility] [place] [address]    Go in 2 days  For follow up. Return if worse in any way.       DISCHARGE MEDICATIONS:  New Prescriptions    PATIROMER SORBITEX CALCIUM (VELTASSA) 8.4 G PACK PACKET    Take 1 packet by mouth daily          (Please note that portions of this note were completed with a voice recognition program.  Efforts were made to edit the dictations but occasionally words are mis-transcribed.)    Mary Beth Martino MD (electronically signed)  Attending Emergency Physician              Mary Beth Martino MD  08/24/19 8611

## 2019-08-26 PROCEDURE — 93010 ELECTROCARDIOGRAM REPORT: CPT | Performed by: INTERNAL MEDICINE

## 2019-10-03 ENCOUNTER — HOSPITAL ENCOUNTER (INPATIENT)
Age: 84
LOS: 5 days | Discharge: HOME HEALTH | DRG: 690 | End: 2019-10-08
Attending: EMERGENCY MEDICINE | Admitting: INTERNAL MEDICINE
Payer: MEDICARE

## 2019-10-03 ENCOUNTER — APPOINTMENT (OUTPATIENT)
Dept: CT IMAGING | Age: 84
DRG: 690 | End: 2019-10-03
Payer: MEDICARE

## 2019-10-03 ENCOUNTER — APPOINTMENT (OUTPATIENT)
Dept: GENERAL RADIOLOGY | Age: 84
DRG: 690 | End: 2019-10-03
Payer: MEDICARE

## 2019-10-03 DIAGNOSIS — R00.1 BRADYCARDIA: Primary | ICD-10-CM

## 2019-10-03 DIAGNOSIS — I10 ESSENTIAL HYPERTENSION: ICD-10-CM

## 2019-10-03 DIAGNOSIS — N18.4 ANEMIA IN STAGE 4 CHRONIC KIDNEY DISEASE (HCC): ICD-10-CM

## 2019-10-03 DIAGNOSIS — D63.1 ANEMIA IN STAGE 4 CHRONIC KIDNEY DISEASE (HCC): ICD-10-CM

## 2019-10-03 DIAGNOSIS — I48.0 PAROXYSMAL A-FIB (HCC): ICD-10-CM
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
## 2019-10-03 PROBLEM — E87.1 HYPONATREMIA: Chronic | Status: ACTIVE | Noted: 2019-10-03

## 2019-10-03 PROBLEM — I95.9 HYPOTENSION: Status: ACTIVE | Noted: 2019-10-03
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
## 2019-10-03 PROBLEM — E87.1 HYPONATREMIA: Status: ACTIVE | Noted: 2019-10-03

## 2019-10-03 PROBLEM — E87.5 HYPERKALEMIA: Status: ACTIVE | Noted: 2019-10-03

## 2019-10-03 LAB
ACANTHOCYTES: ABNORMAL
ALBUMIN SERPL-MCNC: 3.3 G/DL (ref 3.5–4.6)
ALP BLD-CCNC: 117 U/L (ref 35–104)
ALT SERPL-CCNC: 17 U/L (ref 0–41)
ANION GAP SERPL CALCULATED.3IONS-SCNC: 14 MEQ/L (ref 9–15)
AST SERPL-CCNC: 15 U/L (ref 0–40)
ATYPICAL LYMPHOCYTE RELATIVE PERCENT: 1 %
BASOPHILS ABSOLUTE: 0.1 K/UL (ref 0–0.2)
BASOPHILS RELATIVE PERCENT: 1 %
BILIRUB SERPL-MCNC: <0.2 MG/DL (ref 0.2–0.7)
BUN BLDV-MCNC: 79 MG/DL (ref 8–23)
CALCIUM SERPL-MCNC: 9 MG/DL (ref 8.5–9.9)
CHLORIDE BLD-SCNC: 104 MEQ/L (ref 95–107)
CO2: 11 MEQ/L (ref 20–31)
CREAT SERPL-MCNC: 2.92 MG/DL (ref 0.7–1.2)
EOSINOPHILS ABSOLUTE: 0.1 K/UL (ref 0–0.7)
EOSINOPHILS RELATIVE PERCENT: 1 %
GFR AFRICAN AMERICAN: 24.8
GFR NON-AFRICAN AMERICAN: 20.5
GLOBULIN: 5.5 G/DL (ref 2.3–3.5)
GLUCOSE BLD-MCNC: 207 MG/DL (ref 70–99)
HBA1C MFR BLD: 8.6 % (ref 4.8–5.9)
HCT VFR BLD CALC: 26.3 % (ref 42–52)
HEMOGLOBIN: 9 G/DL (ref 14–18)
LYMPHOCYTES ABSOLUTE: 5.1 K/UL (ref 1–4.8)
LYMPHOCYTES RELATIVE PERCENT: 44 %
MCH RBC QN AUTO: 31.1 PG (ref 27–31.3)
MCHC RBC AUTO-ENTMCNC: 34.1 % (ref 33–37)
MCV RBC AUTO: 91.1 FL (ref 80–100)
MONOCYTES ABSOLUTE: 0.8 K/UL (ref 0.2–0.8)
MONOCYTES RELATIVE PERCENT: 6.7 %
NEUTROPHILS ABSOLUTE: 5.4 K/UL (ref 1.4–6.5)
NEUTROPHILS RELATIVE PERCENT: 47 %
OVALOCYTES: ABNORMAL
PDW BLD-RTO: 15.3 % (ref 11.5–14.5)
PLATELET # BLD: 323 K/UL (ref 130–400)
PLATELET SLIDE REVIEW: ABNORMAL
POIKILOCYTES: ABNORMAL
POTASSIUM SERPL-SCNC: 5.7 MEQ/L (ref 3.4–4.9)
RBC # BLD: 2.89 M/UL (ref 4.7–6.1)
REASON FOR REJECTION: NORMAL
REJECTED TEST: NORMAL
SMUDGE CELLS: 4.8
SODIUM BLD-SCNC: 129 MEQ/L (ref 135–144)
TOTAL PROTEIN: 8.8 G/DL (ref 6.3–8)
TROPONIN: <0.01 NG/ML (ref 0–0.01)
WBC # BLD: 11.4 K/UL (ref 4.8–10.8)

## 2019-10-03 PROCEDURE — 73130 X-RAY EXAM OF HAND: CPT

## 2019-10-03 PROCEDURE — 70450 CT HEAD/BRAIN W/O DYE: CPT

## 2019-10-03 PROCEDURE — 83036 HEMOGLOBIN GLYCOSYLATED A1C: CPT

## 2019-10-03 PROCEDURE — 6360000002 HC RX W HCPCS

## 2019-10-03 PROCEDURE — 99285 EMERGENCY DEPT VISIT HI MDM: CPT

## 2019-10-03 PROCEDURE — 85025 COMPLETE CBC W/AUTO DIFF WBC: CPT

## 2019-10-03 PROCEDURE — 2580000003 HC RX 258: Performed by: EMERGENCY MEDICINE

## 2019-10-03 PROCEDURE — 80053 COMPREHEN METABOLIC PANEL: CPT

## 2019-10-03 PROCEDURE — 72125 CT NECK SPINE W/O DYE: CPT

## 2019-10-03 PROCEDURE — 2060000000 HC ICU INTERMEDIATE R&B

## 2019-10-03 PROCEDURE — 36415 COLL VENOUS BLD VENIPUNCTURE: CPT

## 2019-10-03 PROCEDURE — 93005 ELECTROCARDIOGRAM TRACING: CPT | Performed by: EMERGENCY MEDICINE

## 2019-10-03 PROCEDURE — 84484 ASSAY OF TROPONIN QUANT: CPT

## 2019-10-03 PROCEDURE — 6360000002 HC RX W HCPCS: Performed by: EMERGENCY MEDICINE

## 2019-10-03 RX ORDER — BACITRACIN, NEOMYCIN, POLYMYXIN B 400; 3.5; 5 [USP'U]/G; MG/G; [USP'U]/G
OINTMENT TOPICAL 3 TIMES DAILY
Status: DISCONTINUED | OUTPATIENT
Start: 2019-10-04 | End: 2019-10-08 | Stop reason: HOSPADM

## 2019-10-03 RX ORDER — SODIUM POLYSTYRENE SULFONATE 15 G/60ML
15 SUSPENSION ORAL; RECTAL ONCE
Status: COMPLETED | OUTPATIENT
Start: 2019-10-03 | End: 2019-10-04

## 2019-10-03 RX ORDER — DEXTROSE MONOHYDRATE 25 G/50ML
12.5 INJECTION, SOLUTION INTRAVENOUS PRN
Status: DISCONTINUED | OUTPATIENT
Start: 2019-10-03 | End: 2019-10-08 | Stop reason: HOSPADM

## 2019-10-03 RX ORDER — 0.9 % SODIUM CHLORIDE 0.9 %
500 INTRAVENOUS SOLUTION INTRAVENOUS ONCE
Status: COMPLETED | OUTPATIENT
Start: 2019-10-03 | End: 2019-10-04

## 2019-10-03 RX ORDER — DEXTROSE MONOHYDRATE 50 MG/ML
100 INJECTION, SOLUTION INTRAVENOUS PRN
Status: DISCONTINUED | OUTPATIENT
Start: 2019-10-03 | End: 2019-10-08 | Stop reason: HOSPADM

## 2019-10-03 RX ORDER — 0.9 % SODIUM CHLORIDE 0.9 %
1000 INTRAVENOUS SOLUTION INTRAVENOUS ONCE
Status: COMPLETED | OUTPATIENT
Start: 2019-10-03 | End: 2019-10-04

## 2019-10-03 RX ORDER — NICOTINE POLACRILEX 4 MG
15 LOZENGE BUCCAL PRN
Status: DISCONTINUED | OUTPATIENT
Start: 2019-10-03 | End: 2019-10-08 | Stop reason: HOSPADM

## 2019-10-03 RX ORDER — ONDANSETRON 2 MG/ML
4 INJECTION INTRAMUSCULAR; INTRAVENOUS EVERY 6 HOURS PRN
Status: DISCONTINUED | OUTPATIENT
Start: 2019-10-03 | End: 2019-10-08 | Stop reason: HOSPADM

## 2019-10-03 RX ORDER — ATROPINE SULFATE 0.1 MG/ML
INJECTION INTRAVENOUS
Status: COMPLETED
Start: 2019-10-03 | End: 2019-10-03

## 2019-10-03 RX ORDER — ACETAMINOPHEN 325 MG/1
650 TABLET ORAL EVERY 4 HOURS PRN
Status: DISCONTINUED | OUTPATIENT
Start: 2019-10-03 | End: 2019-10-08 | Stop reason: HOSPADM

## 2019-10-03 RX ORDER — PANTOPRAZOLE SODIUM 40 MG/1
40 TABLET, DELAYED RELEASE ORAL
Status: DISCONTINUED | OUTPATIENT
Start: 2019-10-04 | End: 2019-10-08 | Stop reason: HOSPADM

## 2019-10-03 RX ORDER — SODIUM CHLORIDE 0.9 % (FLUSH) 0.9 %
10 SYRINGE (ML) INJECTION PRN
Status: DISCONTINUED | OUTPATIENT
Start: 2019-10-03 | End: 2019-10-08 | Stop reason: HOSPADM

## 2019-10-03 RX ORDER — TRAMADOL HYDROCHLORIDE 50 MG/1
50 TABLET ORAL EVERY 6 HOURS PRN
Status: DISCONTINUED | OUTPATIENT
Start: 2019-10-03 | End: 2019-10-08 | Stop reason: HOSPADM

## 2019-10-03 RX ORDER — FUROSEMIDE 10 MG/ML
40 INJECTION INTRAMUSCULAR; INTRAVENOUS ONCE
Status: COMPLETED | OUTPATIENT
Start: 2019-10-03 | End: 2019-10-03

## 2019-10-03 RX ORDER — HEPARIN SODIUM 5000 [USP'U]/ML
5000 INJECTION, SOLUTION INTRAVENOUS; SUBCUTANEOUS EVERY 8 HOURS SCHEDULED
Status: DISCONTINUED | OUTPATIENT
Start: 2019-10-04 | End: 2019-10-08 | Stop reason: HOSPADM

## 2019-10-03 RX ORDER — SODIUM CHLORIDE 0.9 % (FLUSH) 0.9 %
10 SYRINGE (ML) INJECTION EVERY 12 HOURS SCHEDULED
Status: DISCONTINUED | OUTPATIENT
Start: 2019-10-03 | End: 2019-10-08 | Stop reason: HOSPADM

## 2019-10-03 RX ADMIN — ATROPINE SULFATE 1 MG: 0.1 INJECTION PARENTERAL at 21:12

## 2019-10-03 RX ADMIN — FUROSEMIDE 40 MG: 10 INJECTION, SOLUTION INTRAMUSCULAR; INTRAVENOUS at 22:36

## 2019-10-03 RX ADMIN — SODIUM CHLORIDE 1000 ML: 9 INJECTION, SOLUTION INTRAVENOUS at 22:36

## 2019-10-03 ASSESSMENT — ENCOUNTER SYMPTOMS
CHEST TIGHTNESS: 0
SHORTNESS OF BREATH: 0
ABDOMINAL DISTENTION: 0
PHOTOPHOBIA: 0
WHEEZING: 0
COUGH: 0
SORE THROAT: 0
EYE DISCHARGE: 0
VOMITING: 0
ABDOMINAL PAIN: 0

## 2019-10-03 ASSESSMENT — PAIN DESCRIPTION - PAIN TYPE: TYPE: ACUTE PAIN

## 2019-10-03 ASSESSMENT — PAIN DESCRIPTION - LOCATION: LOCATION: HAND;WRIST

## 2019-10-03 ASSESSMENT — PAIN SCALES - GENERAL: PAINLEVEL_OUTOF10: 3

## 2019-10-04 ENCOUNTER — APPOINTMENT (OUTPATIENT)
Dept: ULTRASOUND IMAGING | Age: 84
DRG: 690 | End: 2019-10-04
Payer: MEDICARE

## 2019-10-04 LAB
ALBUMIN SERPL-MCNC: 3.2 G/DL (ref 3.5–4.6)
ALP BLD-CCNC: 118 U/L (ref 35–104)
ALT SERPL-CCNC: 15 U/L (ref 0–41)
ANION GAP SERPL CALCULATED.3IONS-SCNC: 11 MEQ/L (ref 9–15)
AST SERPL-CCNC: 13 U/L (ref 0–40)
BACTERIA: ABNORMAL /HPF
BASOPHILS ABSOLUTE: 0 K/UL (ref 0–0.2)
BASOPHILS RELATIVE PERCENT: 0.2 %
BETA-HYDROXYBUTYRATE: 5 MG/DL (ref 0.2–2.8)
BILIRUB SERPL-MCNC: 0.3 MG/DL (ref 0.2–0.7)
BILIRUBIN URINE: NEGATIVE
BLOOD, URINE: ABNORMAL
BUN BLDV-MCNC: 83 MG/DL (ref 8–23)
CALCIUM SERPL-MCNC: 8.8 MG/DL (ref 8.5–9.9)
CHLORIDE BLD-SCNC: 110 MEQ/L (ref 95–107)
CLARITY: ABNORMAL
CO2: 10 MEQ/L (ref 20–31)
COLOR: YELLOW
CREAT SERPL-MCNC: 3.01 MG/DL (ref 0.7–1.2)
CREATININE URINE: 48.8 MG/DL
EKG ATRIAL RATE: 58 BPM
EKG ATRIAL RATE: 64 BPM
EKG P-R INTERVAL: 164 MS
EKG P-R INTERVAL: 176 MS
EKG Q-T INTERVAL: 440 MS
EKG Q-T INTERVAL: 472 MS
EKG QRS DURATION: 140 MS
EKG QRS DURATION: 148 MS
EKG QTC CALCULATION (BAZETT): 453 MS
EKG QTC CALCULATION (BAZETT): 463 MS
EKG R AXIS: 56 DEGREES
EKG R AXIS: 73 DEGREES
EKG T AXIS: 71 DEGREES
EKG T AXIS: 73 DEGREES
EKG VENTRICULAR RATE: 58 BPM
EKG VENTRICULAR RATE: 64 BPM
EOSINOPHILS ABSOLUTE: 0 K/UL (ref 0–0.7)
EOSINOPHILS RELATIVE PERCENT: 0.1 %
EPITHELIAL CELLS, UA: ABNORMAL /HPF (ref 0–5)
FERRITIN: 114.6 NG/ML (ref 30–400)
GFR AFRICAN AMERICAN: 23.9
GFR NON-AFRICAN AMERICAN: 19.8
GLOBULIN: 4.6 G/DL (ref 2.3–3.5)
GLUCOSE BLD-MCNC: 109 MG/DL (ref 60–115)
GLUCOSE BLD-MCNC: 121 MG/DL (ref 60–115)
GLUCOSE BLD-MCNC: 138 MG/DL (ref 60–115)
GLUCOSE BLD-MCNC: 218 MG/DL (ref 70–99)
GLUCOSE BLD-MCNC: 226 MG/DL (ref 60–115)
GLUCOSE URINE: NEGATIVE MG/DL
HCT VFR BLD CALC: 25.7 % (ref 42–52)
HEMOGLOBIN: 8.4 G/DL (ref 14–18)
HYALINE CASTS: ABNORMAL /HPF (ref 0–5)
IRON SATURATION: 25 % (ref 11–46)
IRON: 62 UG/DL (ref 59–158)
KETONES, URINE: NEGATIVE MG/DL
LACTIC ACID: 0.9 MMOL/L (ref 0.5–2.2)
LEUKOCYTE ESTERASE, URINE: ABNORMAL
LV EF: 65 %
LVEF MODALITY: NORMAL
LYMPHOCYTES ABSOLUTE: 2.4 K/UL (ref 1–4.8)
LYMPHOCYTES RELATIVE PERCENT: 21.1 %
MCH RBC QN AUTO: 29.9 PG (ref 27–31.3)
MCHC RBC AUTO-ENTMCNC: 32.8 % (ref 33–37)
MCV RBC AUTO: 91 FL (ref 80–100)
MONOCYTES ABSOLUTE: 0.8 K/UL (ref 0.2–0.8)
MONOCYTES RELATIVE PERCENT: 7.3 %
NEUTROPHILS ABSOLUTE: 8 K/UL (ref 1.4–6.5)
NEUTROPHILS RELATIVE PERCENT: 71.3 %
NITRITE, URINE: NEGATIVE
PARATHYROID HORMONE INTACT: 51.3 PG/ML (ref 15–65)
PDW BLD-RTO: 15.4 % (ref 11.5–14.5)
PERFORMED ON: ABNORMAL
PERFORMED ON: NORMAL
PH UA: 7 (ref 5–9)
PHOSPHORUS: 4.2 MG/DL (ref 2.3–4.8)
PLATELET # BLD: 319 K/UL (ref 130–400)
POTASSIUM REFLEX MAGNESIUM: 5.5 MEQ/L (ref 3.4–4.9)
PROTEIN UA: 30 MG/DL
RBC # BLD: 2.82 M/UL (ref 4.7–6.1)
RBC UA: ABNORMAL /HPF (ref 0–5)
SODIUM BLD-SCNC: 131 MEQ/L (ref 135–144)
SODIUM URINE: 88 MEQ/L
SPECIFIC GRAVITY UA: 1.01 (ref 1–1.03)
TOTAL IRON BINDING CAPACITY: 251 UG/DL (ref 178–450)
TOTAL PROTEIN: 7.8 G/DL (ref 6.3–8)
URINE REFLEX TO CULTURE: YES
UROBILINOGEN, URINE: 0.2 E.U./DL
WBC # BLD: 11.2 K/UL (ref 4.8–10.8)
WBC UA: ABNORMAL /HPF (ref 0–5)

## 2019-10-04 PROCEDURE — 99222 1ST HOSP IP/OBS MODERATE 55: CPT | Performed by: PSYCHIATRY & NEUROLOGY

## 2019-10-04 PROCEDURE — 93005 ELECTROCARDIOGRAM TRACING: CPT | Performed by: HOSPITALIST

## 2019-10-04 PROCEDURE — 82010 KETONE BODYS QUAN: CPT

## 2019-10-04 PROCEDURE — 6360000002 HC RX W HCPCS: Performed by: HOSPITALIST

## 2019-10-04 PROCEDURE — 6370000000 HC RX 637 (ALT 250 FOR IP): Performed by: INTERNAL MEDICINE

## 2019-10-04 PROCEDURE — 2060000000 HC ICU INTERMEDIATE R&B

## 2019-10-04 PROCEDURE — 85025 COMPLETE CBC W/AUTO DIFF WBC: CPT

## 2019-10-04 PROCEDURE — 99213 OFFICE O/P EST LOW 20 MIN: CPT

## 2019-10-04 PROCEDURE — 82728 ASSAY OF FERRITIN: CPT

## 2019-10-04 PROCEDURE — 83540 ASSAY OF IRON: CPT

## 2019-10-04 PROCEDURE — 82570 ASSAY OF URINE CREATININE: CPT

## 2019-10-04 PROCEDURE — 87186 SC STD MICRODIL/AGAR DIL: CPT

## 2019-10-04 PROCEDURE — 36415 COLL VENOUS BLD VENIPUNCTURE: CPT

## 2019-10-04 PROCEDURE — 97163 PT EVAL HIGH COMPLEX 45 MIN: CPT

## 2019-10-04 PROCEDURE — 6370000000 HC RX 637 (ALT 250 FOR IP): Performed by: STUDENT IN AN ORGANIZED HEALTH CARE EDUCATION/TRAINING PROGRAM

## 2019-10-04 PROCEDURE — 93306 TTE W/DOPPLER COMPLETE: CPT

## 2019-10-04 PROCEDURE — 83550 IRON BINDING TEST: CPT

## 2019-10-04 PROCEDURE — 93010 ELECTROCARDIOGRAM REPORT: CPT | Performed by: INTERNAL MEDICINE

## 2019-10-04 PROCEDURE — 84300 ASSAY OF URINE SODIUM: CPT

## 2019-10-04 PROCEDURE — 83935 ASSAY OF URINE OSMOLALITY: CPT

## 2019-10-04 PROCEDURE — 83970 ASSAY OF PARATHORMONE: CPT

## 2019-10-04 PROCEDURE — 87077 CULTURE AEROBIC IDENTIFY: CPT

## 2019-10-04 PROCEDURE — 87086 URINE CULTURE/COLONY COUNT: CPT

## 2019-10-04 PROCEDURE — 99223 1ST HOSP IP/OBS HIGH 75: CPT | Performed by: INTERNAL MEDICINE

## 2019-10-04 PROCEDURE — 80053 COMPREHEN METABOLIC PANEL: CPT

## 2019-10-04 PROCEDURE — 2580000003 HC RX 258: Performed by: HOSPITALIST

## 2019-10-04 PROCEDURE — 93880 EXTRACRANIAL BILAT STUDY: CPT

## 2019-10-04 PROCEDURE — 83605 ASSAY OF LACTIC ACID: CPT

## 2019-10-04 PROCEDURE — 6370000000 HC RX 637 (ALT 250 FOR IP): Performed by: HOSPITALIST

## 2019-10-04 PROCEDURE — 81001 URINALYSIS AUTO W/SCOPE: CPT

## 2019-10-04 PROCEDURE — 93880 EXTRACRANIAL BILAT STUDY: CPT | Performed by: INTERNAL MEDICINE

## 2019-10-04 PROCEDURE — 84100 ASSAY OF PHOSPHORUS: CPT

## 2019-10-04 PROCEDURE — 87147 CULTURE TYPE IMMUNOLOGIC: CPT

## 2019-10-04 RX ORDER — INSULIN GLARGINE 100 [IU]/ML
10 INJECTION, SOLUTION SUBCUTANEOUS NIGHTLY
Status: DISCONTINUED | OUTPATIENT
Start: 2019-10-04 | End: 2019-10-08 | Stop reason: HOSPADM

## 2019-10-04 RX ORDER — SODIUM BICARBONATE 650 MG/1
1300 TABLET ORAL 4 TIMES DAILY
Status: DISCONTINUED | OUTPATIENT
Start: 2019-10-04 | End: 2019-10-08 | Stop reason: HOSPADM

## 2019-10-04 RX ORDER — HALOPERIDOL 5 MG/ML
5 INJECTION INTRAMUSCULAR ONCE
Status: DISCONTINUED | OUTPATIENT
Start: 2019-10-04 | End: 2019-10-04

## 2019-10-04 RX ADMIN — HEPARIN SODIUM 5000 UNITS: 5000 INJECTION INTRAVENOUS; SUBCUTANEOUS at 22:14

## 2019-10-04 RX ADMIN — INSULIN GLARGINE 10 UNITS: 100 INJECTION, SOLUTION SUBCUTANEOUS at 22:14

## 2019-10-04 RX ADMIN — Medication 10 ML: at 10:27

## 2019-10-04 RX ADMIN — BACITRACIN ZINC, NEOMYCIN SULFATE, POLYMYXIN B SULFATE 1 G: 3.5; 5000; 4 OINTMENT TOPICAL at 22:14

## 2019-10-04 RX ADMIN — INSULIN LISPRO 4 UNITS: 100 INJECTION, SOLUTION INTRAVENOUS; SUBCUTANEOUS at 09:59

## 2019-10-04 RX ADMIN — Medication 10 ML: at 00:16

## 2019-10-04 RX ADMIN — INSULIN LISPRO 15 UNITS: 100 INJECTION, SUSPENSION SUBCUTANEOUS at 17:18

## 2019-10-04 RX ADMIN — HEPARIN SODIUM 5000 UNITS: 5000 INJECTION INTRAVENOUS; SUBCUTANEOUS at 00:41

## 2019-10-04 RX ADMIN — SODIUM BICARBONATE 1300 MG: 650 TABLET ORAL at 22:14

## 2019-10-04 RX ADMIN — PANTOPRAZOLE SODIUM 40 MG: 40 TABLET, DELAYED RELEASE ORAL at 10:00

## 2019-10-04 RX ADMIN — HEPARIN SODIUM 5000 UNITS: 5000 INJECTION INTRAVENOUS; SUBCUTANEOUS at 14:12

## 2019-10-04 RX ADMIN — SODIUM CHLORIDE 500 ML: 9 INJECTION, SOLUTION INTRAVENOUS at 00:15

## 2019-10-04 RX ADMIN — Medication 10 ML: at 21:00

## 2019-10-04 RX ADMIN — TRAMADOL HYDROCHLORIDE 50 MG: 50 TABLET ORAL at 00:16

## 2019-10-04 RX ADMIN — BACITRACIN ZINC, NEOMYCIN SULFATE, POLYMYXIN B SULFATE 1 G: 3.5; 5000; 4 OINTMENT TOPICAL at 10:27

## 2019-10-04 RX ADMIN — SODIUM POLYSTYRENE SULFONATE 15 G: 15 SUSPENSION ORAL; RECTAL at 00:45

## 2019-10-04 RX ADMIN — BACITRACIN ZINC, NEOMYCIN SULFATE, POLYMYXIN B SULFATE 1 G: 3.5; 5000; 4 OINTMENT TOPICAL at 00:16

## 2019-10-04 RX ADMIN — BACITRACIN ZINC, NEOMYCIN SULFATE, POLYMYXIN B SULFATE 1 G: 3.5; 5000; 4 OINTMENT TOPICAL at 14:13

## 2019-10-04 RX ADMIN — SODIUM BICARBONATE 1300 MG: 650 TABLET ORAL at 16:48

## 2019-10-04 RX ADMIN — HEPARIN SODIUM 5000 UNITS: 5000 INJECTION INTRAVENOUS; SUBCUTANEOUS at 05:31

## 2019-10-04 ASSESSMENT — ENCOUNTER SYMPTOMS
BLOOD IN STOOL: 0
GASTROINTESTINAL NEGATIVE: 1
WHEEZING: 0
STRIDOR: 0
NAUSEA: 0
CHEST TIGHTNESS: 0
BACK PAIN: 1
SHORTNESS OF BREATH: 0
EYES NEGATIVE: 1
COUGH: 0
RESPIRATORY NEGATIVE: 1

## 2019-10-04 ASSESSMENT — PAIN SCALES - GENERAL
PAINLEVEL_OUTOF10: 0
PAINLEVEL_OUTOF10: 0
PAINLEVEL_OUTOF10: 5
PAINLEVEL_OUTOF10: 0
PAINLEVEL_OUTOF10: 0

## 2019-10-05 ENCOUNTER — APPOINTMENT (OUTPATIENT)
Dept: ULTRASOUND IMAGING | Age: 84
DRG: 690 | End: 2019-10-05
Payer: MEDICARE

## 2019-10-05 ENCOUNTER — APPOINTMENT (OUTPATIENT)
Dept: MRI IMAGING | Age: 84
DRG: 690 | End: 2019-10-05
Payer: MEDICARE

## 2019-10-05 LAB
ALBUMIN SERPL-MCNC: 3.2 G/DL (ref 3.5–4.6)
ALP BLD-CCNC: 113 U/L (ref 35–104)
ALT SERPL-CCNC: 12 U/L (ref 0–41)
ANION GAP SERPL CALCULATED.3IONS-SCNC: 13 MEQ/L (ref 9–15)
AST SERPL-CCNC: 12 U/L (ref 0–40)
BASOPHILS ABSOLUTE: 0.1 K/UL (ref 0–0.2)
BASOPHILS RELATIVE PERCENT: 0.7 %
BILIRUB SERPL-MCNC: <0.2 MG/DL (ref 0.2–0.7)
BUN BLDV-MCNC: 75 MG/DL (ref 8–23)
CALCIUM SERPL-MCNC: 9.2 MG/DL (ref 8.5–9.9)
CHLORIDE BLD-SCNC: 113 MEQ/L (ref 95–107)
CO2: 10 MEQ/L (ref 20–31)
CREAT SERPL-MCNC: 2.87 MG/DL (ref 0.7–1.2)
EOSINOPHILS ABSOLUTE: 0.1 K/UL (ref 0–0.7)
EOSINOPHILS RELATIVE PERCENT: 1.4 %
GFR AFRICAN AMERICAN: 25.3
GFR NON-AFRICAN AMERICAN: 20.9
GLOBULIN: 4.6 G/DL (ref 2.3–3.5)
GLUCOSE BLD-MCNC: 115 MG/DL (ref 60–115)
GLUCOSE BLD-MCNC: 138 MG/DL (ref 60–115)
GLUCOSE BLD-MCNC: 254 MG/DL (ref 60–115)
GLUCOSE BLD-MCNC: 93 MG/DL (ref 70–99)
GLUCOSE BLD-MCNC: 95 MG/DL (ref 60–115)
HCT VFR BLD CALC: 26.5 % (ref 42–52)
HEMOGLOBIN: 8.7 G/DL (ref 14–18)
LYMPHOCYTES ABSOLUTE: 2.9 K/UL (ref 1–4.8)
LYMPHOCYTES RELATIVE PERCENT: 29.3 %
MCH RBC QN AUTO: 30.2 PG (ref 27–31.3)
MCHC RBC AUTO-ENTMCNC: 32.9 % (ref 33–37)
MCV RBC AUTO: 91.7 FL (ref 80–100)
MONOCYTES ABSOLUTE: 1 K/UL (ref 0.2–0.8)
MONOCYTES RELATIVE PERCENT: 9.9 %
NEUTROPHILS ABSOLUTE: 5.9 K/UL (ref 1.4–6.5)
NEUTROPHILS RELATIVE PERCENT: 58.7 %
OSMOLALITY URINE: 378 MOSM/KG (ref 300–900)
PDW BLD-RTO: 15.3 % (ref 11.5–14.5)
PERFORMED ON: ABNORMAL
PERFORMED ON: ABNORMAL
PERFORMED ON: NORMAL
PERFORMED ON: NORMAL
PLATELET # BLD: 304 K/UL (ref 130–400)
POTASSIUM REFLEX MAGNESIUM: 4.6 MEQ/L (ref 3.4–4.9)
RBC # BLD: 2.89 M/UL (ref 4.7–6.1)
SODIUM BLD-SCNC: 136 MEQ/L (ref 135–144)
TOTAL PROTEIN: 7.8 G/DL (ref 6.3–8)
WBC # BLD: 10 K/UL (ref 4.8–10.8)

## 2019-10-05 PROCEDURE — 80053 COMPREHEN METABOLIC PANEL: CPT

## 2019-10-05 PROCEDURE — 6370000000 HC RX 637 (ALT 250 FOR IP): Performed by: INTERNAL MEDICINE

## 2019-10-05 PROCEDURE — 6360000002 HC RX W HCPCS: Performed by: INTERNAL MEDICINE

## 2019-10-05 PROCEDURE — G0008 ADMIN INFLUENZA VIRUS VAC: HCPCS | Performed by: INTERNAL MEDICINE

## 2019-10-05 PROCEDURE — 97112 NEUROMUSCULAR REEDUCATION: CPT

## 2019-10-05 PROCEDURE — 70551 MRI BRAIN STEM W/O DYE: CPT

## 2019-10-05 PROCEDURE — 6360000002 HC RX W HCPCS: Performed by: HOSPITALIST

## 2019-10-05 PROCEDURE — 76775 US EXAM ABDO BACK WALL LIM: CPT

## 2019-10-05 PROCEDURE — 85025 COMPLETE CBC W/AUTO DIFF WBC: CPT

## 2019-10-05 PROCEDURE — 2500000003 HC RX 250 WO HCPCS: Performed by: STUDENT IN AN ORGANIZED HEALTH CARE EDUCATION/TRAINING PROGRAM

## 2019-10-05 PROCEDURE — 36415 COLL VENOUS BLD VENIPUNCTURE: CPT

## 2019-10-05 PROCEDURE — 2060000000 HC ICU INTERMEDIATE R&B

## 2019-10-05 PROCEDURE — 6370000000 HC RX 637 (ALT 250 FOR IP): Performed by: STUDENT IN AN ORGANIZED HEALTH CARE EDUCATION/TRAINING PROGRAM

## 2019-10-05 PROCEDURE — 90686 IIV4 VACC NO PRSV 0.5 ML IM: CPT | Performed by: INTERNAL MEDICINE

## 2019-10-05 PROCEDURE — 2580000003 HC RX 258: Performed by: HOSPITALIST

## 2019-10-05 PROCEDURE — 6370000000 HC RX 637 (ALT 250 FOR IP): Performed by: HOSPITALIST

## 2019-10-05 PROCEDURE — 99232 SBSQ HOSP IP/OBS MODERATE 35: CPT | Performed by: INTERNAL MEDICINE

## 2019-10-05 PROCEDURE — 2580000003 HC RX 258: Performed by: STUDENT IN AN ORGANIZED HEALTH CARE EDUCATION/TRAINING PROGRAM

## 2019-10-05 RX ADMIN — BACITRACIN ZINC, NEOMYCIN SULFATE, POLYMYXIN B SULFATE 1 G: 3.5; 5000; 4 OINTMENT TOPICAL at 21:51

## 2019-10-05 RX ADMIN — HEPARIN SODIUM 5000 UNITS: 5000 INJECTION INTRAVENOUS; SUBCUTANEOUS at 21:51

## 2019-10-05 RX ADMIN — INSULIN LISPRO 6 UNITS: 100 INJECTION, SOLUTION INTRAVENOUS; SUBCUTANEOUS at 11:44

## 2019-10-05 RX ADMIN — BACITRACIN ZINC, NEOMYCIN SULFATE, POLYMYXIN B SULFATE 1 G: 3.5; 5000; 4 OINTMENT TOPICAL at 13:42

## 2019-10-05 RX ADMIN — HEPARIN SODIUM 5000 UNITS: 5000 INJECTION INTRAVENOUS; SUBCUTANEOUS at 06:17

## 2019-10-05 RX ADMIN — Medication: at 13:41

## 2019-10-05 RX ADMIN — INSULIN GLARGINE 10 UNITS: 100 INJECTION, SOLUTION SUBCUTANEOUS at 21:51

## 2019-10-05 RX ADMIN — INSULIN LISPRO 15 UNITS: 100 INJECTION, SUSPENSION SUBCUTANEOUS at 17:47

## 2019-10-05 RX ADMIN — Medication 10 ML: at 08:07

## 2019-10-05 RX ADMIN — HEPARIN SODIUM 5000 UNITS: 5000 INJECTION INTRAVENOUS; SUBCUTANEOUS at 13:42

## 2019-10-05 RX ADMIN — INFLUENZA A VIRUS A/BRISBANE/02/2018 IVR-190 (H1N1) ANTIGEN (PROPIOLACTONE INACTIVATED), INFLUENZA A VIRUS A/KANSAS/14/2017 X-327 (H3N2) ANTIGEN (PROPIOLACTONE INACTIVATED), INFLUENZA B VIRUS B/MARYLAND/15/2016 ANTIGEN (PROPIOLACTONE INACTIVATED), INFLUENZA B VIRUS B/PHUKET/3073/2013 BVR-1B ANTIGEN (PROPIOLACTONE INACTIVATED) 0.5 ML: 15; 15; 15; 15 INJECTION, SUSPENSION INTRAMUSCULAR at 08:07

## 2019-10-05 RX ADMIN — BACITRACIN ZINC, NEOMYCIN SULFATE, POLYMYXIN B SULFATE 1 G: 3.5; 5000; 4 OINTMENT TOPICAL at 08:19

## 2019-10-05 RX ADMIN — SODIUM BICARBONATE 1300 MG: 650 TABLET ORAL at 08:06

## 2019-10-05 RX ADMIN — PANTOPRAZOLE SODIUM 40 MG: 40 TABLET, DELAYED RELEASE ORAL at 08:06

## 2019-10-05 RX ADMIN — INSULIN LISPRO 15 UNITS: 100 INJECTION, SUSPENSION SUBCUTANEOUS at 08:06

## 2019-10-05 ASSESSMENT — PAIN SCALES - GENERAL
PAINLEVEL_OUTOF10: 0

## 2019-10-06 LAB
ALBUMIN SERPL-MCNC: 3.1 G/DL (ref 3.5–4.6)
ALP BLD-CCNC: 103 U/L (ref 35–104)
ALT SERPL-CCNC: 9 U/L (ref 0–41)
ANION GAP SERPL CALCULATED.3IONS-SCNC: 12 MEQ/L (ref 9–15)
AST SERPL-CCNC: 10 U/L (ref 0–40)
BASOPHILS ABSOLUTE: 0.1 K/UL (ref 0–0.2)
BASOPHILS RELATIVE PERCENT: 0.7 %
BILIRUB SERPL-MCNC: <0.2 MG/DL (ref 0.2–0.7)
BUN BLDV-MCNC: 70 MG/DL (ref 8–23)
CALCIUM SERPL-MCNC: 9 MG/DL (ref 8.5–9.9)
CHLORIDE BLD-SCNC: 109 MEQ/L (ref 95–107)
CO2: 15 MEQ/L (ref 20–31)
CREAT SERPL-MCNC: 3.01 MG/DL (ref 0.7–1.2)
EOSINOPHILS ABSOLUTE: 0.3 K/UL (ref 0–0.7)
EOSINOPHILS RELATIVE PERCENT: 2.5 %
GFR AFRICAN AMERICAN: 23.9
GFR NON-AFRICAN AMERICAN: 19.8
GLOBULIN: 4.5 G/DL (ref 2.3–3.5)
GLUCOSE BLD-MCNC: 184 MG/DL (ref 60–115)
GLUCOSE BLD-MCNC: 211 MG/DL (ref 60–115)
GLUCOSE BLD-MCNC: 246 MG/DL (ref 60–115)
GLUCOSE BLD-MCNC: 77 MG/DL (ref 70–99)
GLUCOSE BLD-MCNC: 79 MG/DL (ref 60–115)
HCT VFR BLD CALC: 25.1 % (ref 42–52)
HEMOGLOBIN: 8.2 G/DL (ref 14–18)
LYMPHOCYTES ABSOLUTE: 3.6 K/UL (ref 1–4.8)
LYMPHOCYTES RELATIVE PERCENT: 29.4 %
MCH RBC QN AUTO: 29.5 PG (ref 27–31.3)
MCHC RBC AUTO-ENTMCNC: 32.6 % (ref 33–37)
MCV RBC AUTO: 90.5 FL (ref 80–100)
MONOCYTES ABSOLUTE: 1.5 K/UL (ref 0.2–0.8)
MONOCYTES RELATIVE PERCENT: 12.2 %
NEUTROPHILS ABSOLUTE: 6.7 K/UL (ref 1.4–6.5)
NEUTROPHILS RELATIVE PERCENT: 55.2 %
PDW BLD-RTO: 15.7 % (ref 11.5–14.5)
PERFORMED ON: ABNORMAL
PERFORMED ON: NORMAL
PLATELET # BLD: 306 K/UL (ref 130–400)
POTASSIUM REFLEX MAGNESIUM: 4.4 MEQ/L (ref 3.4–4.9)
RBC # BLD: 2.77 M/UL (ref 4.7–6.1)
SODIUM BLD-SCNC: 136 MEQ/L (ref 135–144)
TOTAL PROTEIN: 7.6 G/DL (ref 6.3–8)
WBC # BLD: 12.2 K/UL (ref 4.8–10.8)

## 2019-10-06 PROCEDURE — 6370000000 HC RX 637 (ALT 250 FOR IP): Performed by: INTERNAL MEDICINE

## 2019-10-06 PROCEDURE — 99232 SBSQ HOSP IP/OBS MODERATE 35: CPT | Performed by: INTERNAL MEDICINE

## 2019-10-06 PROCEDURE — 85025 COMPLETE CBC W/AUTO DIFF WBC: CPT

## 2019-10-06 PROCEDURE — 2060000000 HC ICU INTERMEDIATE R&B

## 2019-10-06 PROCEDURE — 99233 SBSQ HOSP IP/OBS HIGH 50: CPT | Performed by: PSYCHIATRY & NEUROLOGY

## 2019-10-06 PROCEDURE — 6370000000 HC RX 637 (ALT 250 FOR IP): Performed by: HOSPITALIST

## 2019-10-06 PROCEDURE — 2580000003 HC RX 258: Performed by: INTERNAL MEDICINE

## 2019-10-06 PROCEDURE — 87040 BLOOD CULTURE FOR BACTERIA: CPT

## 2019-10-06 PROCEDURE — 36415 COLL VENOUS BLD VENIPUNCTURE: CPT

## 2019-10-06 PROCEDURE — 6360000002 HC RX W HCPCS: Performed by: HOSPITALIST

## 2019-10-06 PROCEDURE — 2580000003 HC RX 258: Performed by: HOSPITALIST

## 2019-10-06 PROCEDURE — 80053 COMPREHEN METABOLIC PANEL: CPT

## 2019-10-06 PROCEDURE — 6360000002 HC RX W HCPCS: Performed by: INTERNAL MEDICINE

## 2019-10-06 RX ADMIN — BACITRACIN ZINC, NEOMYCIN SULFATE, POLYMYXIN B SULFATE 1 G: 3.5; 5000; 4 OINTMENT TOPICAL at 21:05

## 2019-10-06 RX ADMIN — INSULIN LISPRO 4 UNITS: 100 INJECTION, SOLUTION INTRAVENOUS; SUBCUTANEOUS at 17:37

## 2019-10-06 RX ADMIN — VANCOMYCIN HYDROCHLORIDE 1000 MG: 1 INJECTION, POWDER, LYOPHILIZED, FOR SOLUTION INTRAVENOUS at 12:31

## 2019-10-06 RX ADMIN — INSULIN LISPRO 4 UNITS: 100 INJECTION, SOLUTION INTRAVENOUS; SUBCUTANEOUS at 14:04

## 2019-10-06 RX ADMIN — HEPARIN SODIUM 5000 UNITS: 5000 INJECTION INTRAVENOUS; SUBCUTANEOUS at 21:05

## 2019-10-06 RX ADMIN — HEPARIN SODIUM 5000 UNITS: 5000 INJECTION INTRAVENOUS; SUBCUTANEOUS at 13:22

## 2019-10-06 RX ADMIN — PANTOPRAZOLE SODIUM 40 MG: 40 TABLET, DELAYED RELEASE ORAL at 06:40

## 2019-10-06 RX ADMIN — HEPARIN SODIUM 5000 UNITS: 5000 INJECTION INTRAVENOUS; SUBCUTANEOUS at 06:39

## 2019-10-06 RX ADMIN — Medication 10 ML: at 21:05

## 2019-10-06 RX ADMIN — BACITRACIN ZINC, NEOMYCIN SULFATE, POLYMYXIN B SULFATE 1 G: 3.5; 5000; 4 OINTMENT TOPICAL at 09:09

## 2019-10-06 RX ADMIN — INSULIN GLARGINE 10 UNITS: 100 INJECTION, SOLUTION SUBCUTANEOUS at 21:14

## 2019-10-06 RX ADMIN — BACITRACIN ZINC, NEOMYCIN SULFATE, POLYMYXIN B SULFATE 1 G: 3.5; 5000; 4 OINTMENT TOPICAL at 13:22

## 2019-10-06 RX ADMIN — INSULIN LISPRO 15 UNITS: 100 INJECTION, SUSPENSION SUBCUTANEOUS at 17:38

## 2019-10-06 RX ADMIN — Medication 10 ML: at 09:10

## 2019-10-06 ASSESSMENT — ENCOUNTER SYMPTOMS
VOMITING: 0
SHORTNESS OF BREATH: 0
COLOR CHANGE: 0
APNEA: 0
STRIDOR: 0
PHOTOPHOBIA: 0
CHOKING: 0
SORE THROAT: 0
WHEEZING: 0
GASTROINTESTINAL NEGATIVE: 1
FACIAL SWELLING: 0
EYE ITCHING: 0
CHEST TIGHTNESS: 0
COUGH: 0
SINUS PRESSURE: 0
EYE DISCHARGE: 0
NAUSEA: 0
TROUBLE SWALLOWING: 0
BACK PAIN: 0

## 2019-10-06 ASSESSMENT — PAIN SCALES - GENERAL
PAINLEVEL_OUTOF10: 0

## 2019-10-07 LAB
ALBUMIN SERPL-MCNC: 3 G/DL (ref 3.5–4.6)
ALP BLD-CCNC: 100 U/L (ref 35–104)
ALT SERPL-CCNC: 9 U/L (ref 0–41)
ANION GAP SERPL CALCULATED.3IONS-SCNC: 13 MEQ/L (ref 9–15)
AST SERPL-CCNC: 11 U/L (ref 0–40)
BASOPHILS ABSOLUTE: 0.1 K/UL (ref 0–0.2)
BASOPHILS RELATIVE PERCENT: 0.8 %
BILIRUB SERPL-MCNC: <0.2 MG/DL (ref 0.2–0.7)
BUN BLDV-MCNC: 70 MG/DL (ref 8–23)
CALCIUM SERPL-MCNC: 9.2 MG/DL (ref 8.5–9.9)
CHLORIDE BLD-SCNC: 112 MEQ/L (ref 95–107)
CO2: 15 MEQ/L (ref 20–31)
CREAT SERPL-MCNC: 2.63 MG/DL (ref 0.7–1.2)
EOSINOPHILS ABSOLUTE: 0.4 K/UL (ref 0–0.7)
EOSINOPHILS RELATIVE PERCENT: 3.3 %
GFR AFRICAN AMERICAN: 28
GFR NON-AFRICAN AMERICAN: 23.1
GLOBULIN: 4.8 G/DL (ref 2.3–3.5)
GLUCOSE BLD-MCNC: 109 MG/DL (ref 60–115)
GLUCOSE BLD-MCNC: 174 MG/DL (ref 60–115)
GLUCOSE BLD-MCNC: 198 MG/DL (ref 60–115)
GLUCOSE BLD-MCNC: 280 MG/DL (ref 60–115)
GLUCOSE BLD-MCNC: 95 MG/DL (ref 70–99)
HCT VFR BLD CALC: 26.3 % (ref 42–52)
HEMOGLOBIN: 8.8 G/DL (ref 14–18)
LYMPHOCYTES ABSOLUTE: 3.6 K/UL (ref 1–4.8)
LYMPHOCYTES RELATIVE PERCENT: 30.4 %
MCH RBC QN AUTO: 30.2 PG (ref 27–31.3)
MCHC RBC AUTO-ENTMCNC: 33.3 % (ref 33–37)
MCV RBC AUTO: 90.8 FL (ref 80–100)
MONOCYTES ABSOLUTE: 1.4 K/UL (ref 0.2–0.8)
MONOCYTES RELATIVE PERCENT: 11.7 %
NEUTROPHILS ABSOLUTE: 6.4 K/UL (ref 1.4–6.5)
NEUTROPHILS RELATIVE PERCENT: 53.8 %
ORGANISM: ABNORMAL
PDW BLD-RTO: 16.1 % (ref 11.5–14.5)
PERFORMED ON: ABNORMAL
PERFORMED ON: NORMAL
PLATELET # BLD: 315 K/UL (ref 130–400)
POTASSIUM REFLEX MAGNESIUM: 4.7 MEQ/L (ref 3.4–4.9)
RBC # BLD: 2.9 M/UL (ref 4.7–6.1)
SODIUM BLD-SCNC: 140 MEQ/L (ref 135–144)
TOTAL PROTEIN: 7.8 G/DL (ref 6.3–8)
URINE CULTURE, ROUTINE: ABNORMAL
WBC # BLD: 12 K/UL (ref 4.8–10.8)

## 2019-10-07 PROCEDURE — 6360000002 HC RX W HCPCS: Performed by: HOSPITALIST

## 2019-10-07 PROCEDURE — 99221 1ST HOSP IP/OBS SF/LOW 40: CPT | Performed by: UROLOGY

## 2019-10-07 PROCEDURE — 97116 GAIT TRAINING THERAPY: CPT

## 2019-10-07 PROCEDURE — 97166 OT EVAL MOD COMPLEX 45 MIN: CPT

## 2019-10-07 PROCEDURE — 6370000000 HC RX 637 (ALT 250 FOR IP): Performed by: HOSPITALIST

## 2019-10-07 PROCEDURE — 99232 SBSQ HOSP IP/OBS MODERATE 35: CPT | Performed by: INTERNAL MEDICINE

## 2019-10-07 PROCEDURE — 85025 COMPLETE CBC W/AUTO DIFF WBC: CPT

## 2019-10-07 PROCEDURE — 6370000000 HC RX 637 (ALT 250 FOR IP): Performed by: STUDENT IN AN ORGANIZED HEALTH CARE EDUCATION/TRAINING PROGRAM

## 2019-10-07 PROCEDURE — 80053 COMPREHEN METABOLIC PANEL: CPT

## 2019-10-07 PROCEDURE — 6370000000 HC RX 637 (ALT 250 FOR IP): Performed by: INTERNAL MEDICINE

## 2019-10-07 PROCEDURE — 6360000002 HC RX W HCPCS: Performed by: STUDENT IN AN ORGANIZED HEALTH CARE EDUCATION/TRAINING PROGRAM

## 2019-10-07 PROCEDURE — 2060000000 HC ICU INTERMEDIATE R&B

## 2019-10-07 PROCEDURE — 2580000003 HC RX 258: Performed by: HOSPITALIST

## 2019-10-07 PROCEDURE — 99222 1ST HOSP IP/OBS MODERATE 55: CPT | Performed by: INTERNAL MEDICINE

## 2019-10-07 PROCEDURE — 36415 COLL VENOUS BLD VENIPUNCTURE: CPT

## 2019-10-07 PROCEDURE — 99233 SBSQ HOSP IP/OBS HIGH 50: CPT | Performed by: PSYCHIATRY & NEUROLOGY

## 2019-10-07 RX ADMIN — Medication 10 ML: at 07:57

## 2019-10-07 RX ADMIN — HEPARIN SODIUM 5000 UNITS: 5000 INJECTION INTRAVENOUS; SUBCUTANEOUS at 14:05

## 2019-10-07 RX ADMIN — Medication 10 ML: at 21:14

## 2019-10-07 RX ADMIN — BACITRACIN ZINC, NEOMYCIN SULFATE, POLYMYXIN B SULFATE 1 G: 3.5; 5000; 4 OINTMENT TOPICAL at 21:13

## 2019-10-07 RX ADMIN — SODIUM BICARBONATE 1300 MG: 650 TABLET ORAL at 16:42

## 2019-10-07 RX ADMIN — BACITRACIN ZINC, NEOMYCIN SULFATE, POLYMYXIN B SULFATE 1 G: 3.5; 5000; 4 OINTMENT TOPICAL at 14:04

## 2019-10-07 RX ADMIN — INSULIN LISPRO 15 UNITS: 100 INJECTION, SUSPENSION SUBCUTANEOUS at 16:42

## 2019-10-07 RX ADMIN — BACITRACIN ZINC, NEOMYCIN SULFATE, POLYMYXIN B SULFATE: 3.5; 5000; 4 OINTMENT TOPICAL at 08:06

## 2019-10-07 RX ADMIN — DARBEPOETIN ALFA 25 MCG: 25 SOLUTION INTRAVENOUS; SUBCUTANEOUS at 16:41

## 2019-10-07 RX ADMIN — INSULIN LISPRO 6 UNITS: 100 INJECTION, SOLUTION INTRAVENOUS; SUBCUTANEOUS at 12:03

## 2019-10-07 RX ADMIN — SODIUM BICARBONATE 1300 MG: 650 TABLET ORAL at 14:04

## 2019-10-07 RX ADMIN — SODIUM BICARBONATE 1300 MG: 650 TABLET ORAL at 21:13

## 2019-10-07 RX ADMIN — INSULIN LISPRO 2 UNITS: 100 INJECTION, SOLUTION INTRAVENOUS; SUBCUTANEOUS at 16:42

## 2019-10-07 RX ADMIN — HEPARIN SODIUM 5000 UNITS: 5000 INJECTION INTRAVENOUS; SUBCUTANEOUS at 06:50

## 2019-10-07 RX ADMIN — HEPARIN SODIUM 5000 UNITS: 5000 INJECTION INTRAVENOUS; SUBCUTANEOUS at 21:13

## 2019-10-07 RX ADMIN — INSULIN LISPRO 15 UNITS: 100 INJECTION, SUSPENSION SUBCUTANEOUS at 07:57

## 2019-10-07 RX ADMIN — INSULIN GLARGINE 10 UNITS: 100 INJECTION, SOLUTION SUBCUTANEOUS at 21:19

## 2019-10-07 RX ADMIN — PANTOPRAZOLE SODIUM 40 MG: 40 TABLET, DELAYED RELEASE ORAL at 06:51

## 2019-10-07 ASSESSMENT — ENCOUNTER SYMPTOMS
GASTROINTESTINAL NEGATIVE: 1
VOMITING: 0
NAUSEA: 0
WHEEZING: 0
CHEST TIGHTNESS: 0
COLOR CHANGE: 0
COUGH: 0
STRIDOR: 0
BLOOD IN STOOL: 0
EYES NEGATIVE: 1
ABDOMINAL PAIN: 0
RESPIRATORY NEGATIVE: 1
TROUBLE SWALLOWING: 0
SHORTNESS OF BREATH: 0

## 2019-10-07 ASSESSMENT — PAIN SCALES - GENERAL
PAINLEVEL_OUTOF10: 0

## 2019-10-08 VITALS
WEIGHT: 171 LBS | HEIGHT: 70 IN | SYSTOLIC BLOOD PRESSURE: 124 MMHG | BODY MASS INDEX: 24.48 KG/M2 | OXYGEN SATURATION: 100 % | DIASTOLIC BLOOD PRESSURE: 50 MMHG | TEMPERATURE: 98.1 F | RESPIRATION RATE: 18 BRPM | HEART RATE: 79 BPM

## 2019-10-08 LAB
ALBUMIN SERPL-MCNC: 3 G/DL (ref 3.5–4.6)
ALP BLD-CCNC: 94 U/L (ref 35–104)
ALT SERPL-CCNC: 9 U/L (ref 0–41)
ANION GAP SERPL CALCULATED.3IONS-SCNC: 13 MEQ/L (ref 9–15)
AST SERPL-CCNC: 10 U/L (ref 0–40)
BASOPHILS ABSOLUTE: 0.1 K/UL (ref 0–0.2)
BASOPHILS RELATIVE PERCENT: 0.6 %
BILIRUB SERPL-MCNC: <0.2 MG/DL (ref 0.2–0.7)
BUN BLDV-MCNC: 69 MG/DL (ref 8–23)
CALCIUM SERPL-MCNC: 9.1 MG/DL (ref 8.5–9.9)
CHLORIDE BLD-SCNC: 108 MEQ/L (ref 95–107)
CO2: 17 MEQ/L (ref 20–31)
CREAT SERPL-MCNC: 2.54 MG/DL (ref 0.7–1.2)
EOSINOPHILS ABSOLUTE: 0.4 K/UL (ref 0–0.7)
EOSINOPHILS RELATIVE PERCENT: 4 %
GFR AFRICAN AMERICAN: 29.1
GFR NON-AFRICAN AMERICAN: 24.1
GLOBULIN: 4.7 G/DL (ref 2.3–3.5)
GLUCOSE BLD-MCNC: 104 MG/DL (ref 60–115)
GLUCOSE BLD-MCNC: 197 MG/DL (ref 60–115)
GLUCOSE BLD-MCNC: 86 MG/DL (ref 60–115)
GLUCOSE BLD-MCNC: 92 MG/DL (ref 70–99)
HCT VFR BLD CALC: 25 % (ref 42–52)
HEMOGLOBIN: 8.3 G/DL (ref 14–18)
LYMPHOCYTES ABSOLUTE: 3.6 K/UL (ref 1–4.8)
LYMPHOCYTES RELATIVE PERCENT: 33.4 %
MCH RBC QN AUTO: 30.1 PG (ref 27–31.3)
MCHC RBC AUTO-ENTMCNC: 33.1 % (ref 33–37)
MCV RBC AUTO: 90.9 FL (ref 80–100)
MONOCYTES ABSOLUTE: 1.2 K/UL (ref 0.2–0.8)
MONOCYTES RELATIVE PERCENT: 10.9 %
NEUTROPHILS ABSOLUTE: 5.5 K/UL (ref 1.4–6.5)
NEUTROPHILS RELATIVE PERCENT: 51.1 %
PDW BLD-RTO: 15.8 % (ref 11.5–14.5)
PERFORMED ON: ABNORMAL
PERFORMED ON: NORMAL
PERFORMED ON: NORMAL
PLATELET # BLD: 300 K/UL (ref 130–400)
POTASSIUM REFLEX MAGNESIUM: 4.3 MEQ/L (ref 3.4–4.9)
RBC # BLD: 2.75 M/UL (ref 4.7–6.1)
SODIUM BLD-SCNC: 138 MEQ/L (ref 135–144)
TOTAL PROTEIN: 7.7 G/DL (ref 6.3–8)
VANCOMYCIN RANDOM: 5.1 UG/ML (ref 10–40)
WBC # BLD: 10.9 K/UL (ref 4.8–10.8)

## 2019-10-08 PROCEDURE — 80202 ASSAY OF VANCOMYCIN: CPT

## 2019-10-08 PROCEDURE — 2580000003 HC RX 258: Performed by: INTERNAL MEDICINE

## 2019-10-08 PROCEDURE — 99232 SBSQ HOSP IP/OBS MODERATE 35: CPT | Performed by: INTERNAL MEDICINE

## 2019-10-08 PROCEDURE — 97535 SELF CARE MNGMENT TRAINING: CPT

## 2019-10-08 PROCEDURE — 6360000002 HC RX W HCPCS: Performed by: HOSPITALIST

## 2019-10-08 PROCEDURE — 85025 COMPLETE CBC W/AUTO DIFF WBC: CPT

## 2019-10-08 PROCEDURE — 2580000003 HC RX 258: Performed by: HOSPITALIST

## 2019-10-08 PROCEDURE — 6360000002 HC RX W HCPCS: Performed by: INTERNAL MEDICINE

## 2019-10-08 PROCEDURE — 80053 COMPREHEN METABOLIC PANEL: CPT

## 2019-10-08 PROCEDURE — 6370000000 HC RX 637 (ALT 250 FOR IP): Performed by: HOSPITALIST

## 2019-10-08 PROCEDURE — 99231 SBSQ HOSP IP/OBS SF/LOW 25: CPT | Performed by: UROLOGY

## 2019-10-08 PROCEDURE — 36415 COLL VENOUS BLD VENIPUNCTURE: CPT

## 2019-10-08 PROCEDURE — 99232 SBSQ HOSP IP/OBS MODERATE 35: CPT | Performed by: PSYCHIATRY & NEUROLOGY

## 2019-10-08 PROCEDURE — 6370000000 HC RX 637 (ALT 250 FOR IP): Performed by: STUDENT IN AN ORGANIZED HEALTH CARE EDUCATION/TRAINING PROGRAM

## 2019-10-08 RX ORDER — SODIUM BICARBONATE 650 MG/1
1300 TABLET ORAL 4 TIMES DAILY
Qty: 168 TABLET | Refills: 0 | Status: SHIPPED | OUTPATIENT
Start: 2019-10-08 | End: 2019-10-29

## 2019-10-08 RX ORDER — SULFAMETHOXAZOLE AND TRIMETHOPRIM 800; 160 MG/1; MG/1
1 TABLET ORAL DAILY
Qty: 5 TABLET | Refills: 0 | Status: SHIPPED | OUTPATIENT
Start: 2019-10-08 | End: 2019-10-13

## 2019-10-08 RX ADMIN — INSULIN LISPRO 2 UNITS: 100 INJECTION, SOLUTION INTRAVENOUS; SUBCUTANEOUS at 12:09

## 2019-10-08 RX ADMIN — PANTOPRAZOLE SODIUM 40 MG: 40 TABLET, DELAYED RELEASE ORAL at 06:27

## 2019-10-08 RX ADMIN — HEPARIN SODIUM 5000 UNITS: 5000 INJECTION INTRAVENOUS; SUBCUTANEOUS at 13:40

## 2019-10-08 RX ADMIN — SODIUM BICARBONATE 1300 MG: 650 TABLET ORAL at 13:40

## 2019-10-08 RX ADMIN — INSULIN LISPRO 15 UNITS: 100 INJECTION, SUSPENSION SUBCUTANEOUS at 08:51

## 2019-10-08 RX ADMIN — VANCOMYCIN HYDROCHLORIDE 1000 MG: 1 INJECTION, POWDER, LYOPHILIZED, FOR SOLUTION INTRAVENOUS at 12:09

## 2019-10-08 RX ADMIN — HEPARIN SODIUM 5000 UNITS: 5000 INJECTION INTRAVENOUS; SUBCUTANEOUS at 06:27

## 2019-10-08 RX ADMIN — SODIUM BICARBONATE 1300 MG: 650 TABLET ORAL at 08:51

## 2019-10-08 RX ADMIN — BACITRACIN ZINC, NEOMYCIN SULFATE, POLYMYXIN B SULFATE 1 G: 3.5; 5000; 4 OINTMENT TOPICAL at 13:40

## 2019-10-08 RX ADMIN — Medication 10 ML: at 08:51

## 2019-10-08 RX ADMIN — BACITRACIN ZINC, NEOMYCIN SULFATE, POLYMYXIN B SULFATE 1 G: 3.5; 5000; 4 OINTMENT TOPICAL at 08:51

## 2019-10-08 ASSESSMENT — PAIN SCALES - GENERAL
PAINLEVEL_OUTOF10: 0

## 2019-10-08 ASSESSMENT — ENCOUNTER SYMPTOMS
EYES NEGATIVE: 1
BLOOD IN STOOL: 0
CHEST TIGHTNESS: 0
RESPIRATORY NEGATIVE: 1
NAUSEA: 0
GASTROINTESTINAL NEGATIVE: 1
COUGH: 0
TROUBLE SWALLOWING: 0
WHEEZING: 0
SHORTNESS OF BREATH: 0
STRIDOR: 0
VOMITING: 0
COLOR CHANGE: 0

## 2019-10-11 LAB
BLOOD CULTURE, ROUTINE: NORMAL
CULTURE, BLOOD 2: NORMAL

## 2019-10-26 LAB
HCT VFR BLD CALC: 28.7 % (ref 42–52)
HEMOGLOBIN: 9.5 G/DL (ref 14–18)
MCH RBC QN AUTO: 30.8 PG (ref 27–31.3)
MCHC RBC AUTO-ENTMCNC: 33 % (ref 33–37)
MCV RBC AUTO: 93.5 FL (ref 80–100)
PDW BLD-RTO: 15.5 % (ref 11.5–14.5)
PLATELET # BLD: 406 K/UL (ref 130–400)
RBC # BLD: 3.07 M/UL (ref 4.7–6.1)
WBC # BLD: 8.9 K/UL (ref 4.8–10.8)

## 2020-02-14 PROBLEM — N18.9 ANEMIA OF CHRONIC RENAL FAILURE: Status: ACTIVE | Noted: 2020-02-14

## 2020-02-14 PROBLEM — N18.4 CHRONIC KIDNEY DISEASE, STAGE IV (SEVERE) (HCC): Status: ACTIVE | Noted: 2020-02-14

## 2020-02-14 PROBLEM — D63.1 ANEMIA OF CHRONIC RENAL FAILURE: Status: ACTIVE | Noted: 2020-02-14

## 2020-03-04 ENCOUNTER — HOSPITAL ENCOUNTER (OUTPATIENT)
Dept: INFUSION THERAPY | Age: 85
Setting detail: INFUSION SERIES
Discharge: HOME | End: 2020-03-04
Payer: MEDICARE

## 2020-03-04 VITALS — BODY MASS INDEX: 26.69 KG/M2 | TEMPERATURE: 97.4 F | WEIGHT: 186 LBS

## 2020-03-04 DIAGNOSIS — D63.1 ANEMIA OF CHRONIC RENAL FAILURE, STAGE 4 (SEVERE) (HCC): ICD-10-CM

## 2020-03-04 DIAGNOSIS — N18.4 CHRONIC KIDNEY DISEASE, STAGE IV (SEVERE) (HCC): Primary | ICD-10-CM

## 2020-03-04 DIAGNOSIS — I48.0 PAROXYSMAL A-FIB (HCC): ICD-10-CM

## 2020-03-04 DIAGNOSIS — N18.4 ANEMIA OF CHRONIC RENAL FAILURE, STAGE 4 (SEVERE) (HCC): ICD-10-CM

## 2020-03-04 PROCEDURE — 6360000002 HC RX W HCPCS: Performed by: STUDENT IN AN ORGANIZED HEALTH CARE EDUCATION/TRAINING PROGRAM

## 2020-03-04 PROCEDURE — 96372 THER/PROPH/DIAG INJ SC/IM: CPT

## 2020-03-04 RX ADMIN — EPOETIN ALFA-EPBX 4000 UNITS: 4000 INJECTION, SOLUTION INTRAVENOUS; SUBCUTANEOUS at 10:59

## 2020-03-04 NOTE — PROGRESS NOTES
Pt was given info on med before receiving injection. Stated to  Go ahead. Injection admin. Pt tolerated well.

## 2020-03-04 NOTE — PROGRESS NOTES
Pt  To [facility] via w/c with transporter and spouse, for retacrit. Discussion on reason for being here with spouse. States thought pt was to be here for an iron infusion. Called Dr. Edy Grant office, spoke with Soco. Soco read the notes from doctor, and it is supposed to be just injection . Iron level is okay, Hgb low.

## 2020-03-18 ENCOUNTER — HOSPITAL ENCOUNTER (OUTPATIENT)
Dept: INFUSION THERAPY | Age: 85
Setting detail: INFUSION SERIES
Discharge: HOME | End: 2020-03-18
Payer: MEDICARE

## 2020-03-18 VITALS
DIASTOLIC BLOOD PRESSURE: 67 MMHG | BODY MASS INDEX: 25.11 KG/M2 | HEART RATE: 62 BPM | TEMPERATURE: 97.5 F | RESPIRATION RATE: 18 BRPM | SYSTOLIC BLOOD PRESSURE: 144 MMHG | WEIGHT: 175 LBS

## 2020-03-18 DIAGNOSIS — D63.1 ANEMIA OF CHRONIC RENAL FAILURE, STAGE 4 (SEVERE) (HCC): ICD-10-CM

## 2020-03-18 DIAGNOSIS — N18.4 ANEMIA OF CHRONIC RENAL FAILURE, STAGE 4 (SEVERE) (HCC): ICD-10-CM

## 2020-03-18 DIAGNOSIS — I48.0 PAROXYSMAL A-FIB (HCC): ICD-10-CM

## 2020-03-18 DIAGNOSIS — N18.4 CHRONIC KIDNEY DISEASE, STAGE IV (SEVERE) (HCC): Primary | ICD-10-CM

## 2020-03-18 LAB
HCT VFR BLD CALC: 26.9 % (ref 42–52)
HEMOGLOBIN: 8.8 G/DL (ref 14–18)
MCH RBC QN AUTO: 29.1 PG (ref 27–31.3)
MCHC RBC AUTO-ENTMCNC: 32.6 % (ref 33–37)
MCV RBC AUTO: 89.4 FL (ref 80–100)
PDW BLD-RTO: 14.9 % (ref 11.5–14.5)
PLATELET # BLD: 341 K/UL (ref 130–400)
RBC # BLD: 3.01 M/UL (ref 4.7–6.1)
WBC # BLD: 9.9 K/UL (ref 4.8–10.8)

## 2020-03-18 PROCEDURE — 36415 COLL VENOUS BLD VENIPUNCTURE: CPT

## 2020-03-18 PROCEDURE — 85027 COMPLETE CBC AUTOMATED: CPT

## 2020-03-18 PROCEDURE — 6360000002 HC RX W HCPCS: Performed by: STUDENT IN AN ORGANIZED HEALTH CARE EDUCATION/TRAINING PROGRAM

## 2020-03-18 PROCEDURE — 96372 THER/PROPH/DIAG INJ SC/IM: CPT

## 2020-03-18 RX ORDER — FUROSEMIDE 20 MG/1
40 TABLET ORAL DAILY
Status: ON HOLD | COMMUNITY
End: 2020-12-23 | Stop reason: HOSPADM

## 2020-03-18 RX ORDER — CHOLECALCIFEROL (VITAMIN D3) 125 MCG
500 CAPSULE ORAL DAILY
COMMUNITY

## 2020-03-18 RX ADMIN — EPOETIN ALFA-EPBX 3960 UNITS: 4000 INJECTION, SOLUTION INTRAVENOUS; SUBCUTANEOUS at 11:25

## 2020-03-18 NOTE — FLOWSHEET NOTE
Patient to the floor via wheelchair for his labs and injection. Vital signs taken. Denies any discomfort. Call light within reach.

## 2020-04-22 ENCOUNTER — HOSPITAL ENCOUNTER (OUTPATIENT)
Dept: INFUSION THERAPY | Age: 85
Setting detail: INFUSION SERIES
End: 2020-04-22
Payer: MEDICARE

## 2020-05-23 LAB
HCT VFR BLD CALC: 26.1 % (ref 42–52)
HEMOGLOBIN: 8.5 G/DL (ref 14–18)
MCH RBC QN AUTO: 29.4 PG (ref 27–31.3)
MCHC RBC AUTO-ENTMCNC: 32.6 % (ref 33–37)
MCV RBC AUTO: 90.1 FL (ref 80–100)
PDW BLD-RTO: 15.9 % (ref 11.5–14.5)
PLATELET # BLD: 268 K/UL (ref 130–400)
RBC # BLD: 2.9 M/UL (ref 4.7–6.1)
WBC # BLD: 10.4 K/UL (ref 4.8–10.8)

## 2020-12-18 ENCOUNTER — APPOINTMENT (OUTPATIENT)
Dept: GENERAL RADIOLOGY | Age: 85
DRG: 872 | End: 2020-12-18
Payer: MEDICARE

## 2020-12-18 ENCOUNTER — HOSPITAL ENCOUNTER (INPATIENT)
Age: 85
LOS: 5 days | Discharge: NURSING, REHAB OR LONG-TERM CARE | DRG: 872 | End: 2020-12-23
Attending: EMERGENCY MEDICINE | Admitting: INTERNAL MEDICINE
Payer: MEDICARE

## 2020-12-18 PROBLEM — R53.1 WEAKNESS: Status: ACTIVE | Noted: 2020-12-18

## 2020-12-18 LAB
ALBUMIN SERPL-MCNC: 2.7 G/DL (ref 3.5–4.6)
ALP BLD-CCNC: 115 U/L (ref 35–104)
ALT SERPL-CCNC: 18 U/L (ref 0–41)
ANION GAP SERPL CALCULATED.3IONS-SCNC: 12 MEQ/L (ref 9–15)
AST SERPL-CCNC: 15 U/L (ref 0–40)
BASOPHILS ABSOLUTE: 0 K/UL (ref 0–0.2)
BASOPHILS ABSOLUTE: ABNORMAL K/UL (ref 0–0.2)
BASOPHILS RELATIVE PERCENT: 0.3 %
BASOPHILS RELATIVE PERCENT: ABNORMAL %
BILIRUB SERPL-MCNC: 0.3 MG/DL (ref 0.2–0.7)
BILIRUBIN DIRECT: <0.2 MG/DL (ref 0–0.4)
BILIRUBIN, INDIRECT: ABNORMAL MG/DL (ref 0–0.6)
BUN BLDV-MCNC: 70 MG/DL (ref 8–23)
CALCIUM SERPL-MCNC: 9 MG/DL (ref 8.5–9.9)
CHLORIDE BLD-SCNC: 102 MEQ/L (ref 95–107)
CO2: 15 MEQ/L (ref 20–31)
CREAT SERPL-MCNC: 2.91 MG/DL (ref 0.7–1.2)
EKG ATRIAL RATE: 100 BPM
EKG P AXIS: 100 DEGREES
EKG P-R INTERVAL: 148 MS
EKG Q-T INTERVAL: 364 MS
EKG QRS DURATION: 124 MS
EKG QTC CALCULATION (BAZETT): 469 MS
EKG R AXIS: 76 DEGREES
EKG T AXIS: 57 DEGREES
EKG VENTRICULAR RATE: 100 BPM
EOSINOPHILS ABSOLUTE: 0.1 K/UL (ref 0–0.7)
EOSINOPHILS ABSOLUTE: ABNORMAL K/UL (ref 0–0.7)
EOSINOPHILS RELATIVE PERCENT: 0.4 %
EOSINOPHILS RELATIVE PERCENT: ABNORMAL %
GFR AFRICAN AMERICAN: 24.8
GFR NON-AFRICAN AMERICAN: 20.5
GLUCOSE BLD-MCNC: 191 MG/DL (ref 70–99)
GLUCOSE BLD-MCNC: 331 MG/DL (ref 60–115)
HCT VFR BLD CALC: 23.1 % (ref 42–52)
HCT VFR BLD CALC: 27.6 % (ref 42–52)
HEMOGLOBIN: 7.3 G/DL (ref 14–18)
HEMOGLOBIN: 8.6 G/DL (ref 14–18)
LACTIC ACID, SEPSIS: 0.7 MMOL/L (ref 0.5–1.9)
LACTIC ACID, SEPSIS: 0.9 MMOL/L (ref 0.5–1.9)
LACTIC ACID, SEPSIS: 1 MMOL/L (ref 0.5–1.9)
LACTIC ACID, SEPSIS: 1.3 MMOL/L (ref 0.5–1.9)
LYMPHOCYTES ABSOLUTE: 2.5 K/UL (ref 1–4.8)
LYMPHOCYTES ABSOLUTE: ABNORMAL K/UL (ref 1–4.8)
LYMPHOCYTES RELATIVE PERCENT: 16.8 %
LYMPHOCYTES RELATIVE PERCENT: ABNORMAL %
MCH RBC QN AUTO: 27.5 PG (ref 27–31.3)
MCH RBC QN AUTO: 27.8 PG (ref 27–31.3)
MCHC RBC AUTO-ENTMCNC: 31.3 % (ref 33–37)
MCHC RBC AUTO-ENTMCNC: 31.6 % (ref 33–37)
MCV RBC AUTO: 87 FL (ref 80–100)
MCV RBC AUTO: 88.6 FL (ref 80–100)
MONOCYTES ABSOLUTE: 1.2 K/UL (ref 0.2–0.8)
MONOCYTES ABSOLUTE: ABNORMAL K/UL (ref 0.2–0.8)
MONOCYTES RELATIVE PERCENT: 8.2 %
MONOCYTES RELATIVE PERCENT: ABNORMAL %
NEUTROPHILS ABSOLUTE: 11.3 K/UL (ref 1.4–6.5)
NEUTROPHILS ABSOLUTE: ABNORMAL K/UL (ref 1.4–6.5)
NEUTROPHILS RELATIVE PERCENT: 74.3 %
NEUTROPHILS RELATIVE PERCENT: ABNORMAL %
PDW BLD-RTO: 14.5 % (ref 11.5–14.5)
PDW BLD-RTO: 15.1 % (ref 11.5–14.5)
PERFORMED ON: ABNORMAL
PLATELET # BLD: 464 K/UL (ref 130–400)
PLATELET # BLD: 476 K/UL (ref 130–400)
POTASSIUM SERPL-SCNC: 5.3 MEQ/L (ref 3.4–4.9)
RBC # BLD: 2.66 M/UL (ref 4.7–6.1)
RBC # BLD: 3.11 M/UL (ref 4.7–6.1)
SARS-COV-2, NAAT: NOT DETECTED
SODIUM BLD-SCNC: 129 MEQ/L (ref 135–144)
TOTAL PROTEIN: 8.3 G/DL (ref 6.3–8)
TROPONIN: 0.02 NG/ML (ref 0–0.01)
TROPONIN: 0.03 NG/ML (ref 0–0.01)
TROPONIN: 0.04 NG/ML (ref 0–0.01)
WBC # BLD: 15.2 K/UL (ref 4.8–10.8)
WBC # BLD: 18.2 K/UL (ref 4.8–10.8)

## 2020-12-18 PROCEDURE — U0002 COVID-19 LAB TEST NON-CDC: HCPCS

## 2020-12-18 PROCEDURE — 83605 ASSAY OF LACTIC ACID: CPT

## 2020-12-18 PROCEDURE — 6360000002 HC RX W HCPCS: Performed by: INTERNAL MEDICINE

## 2020-12-18 PROCEDURE — 36415 COLL VENOUS BLD VENIPUNCTURE: CPT

## 2020-12-18 PROCEDURE — 2580000003 HC RX 258: Performed by: EMERGENCY MEDICINE

## 2020-12-18 PROCEDURE — 6360000002 HC RX W HCPCS: Performed by: EMERGENCY MEDICINE

## 2020-12-18 PROCEDURE — 84484 ASSAY OF TROPONIN QUANT: CPT

## 2020-12-18 PROCEDURE — 93005 ELECTROCARDIOGRAM TRACING: CPT | Performed by: EMERGENCY MEDICINE

## 2020-12-18 PROCEDURE — 99285 EMERGENCY DEPT VISIT HI MDM: CPT

## 2020-12-18 PROCEDURE — 87086 URINE CULTURE/COLONY COUNT: CPT

## 2020-12-18 PROCEDURE — 96365 THER/PROPH/DIAG IV INF INIT: CPT

## 2020-12-18 PROCEDURE — 1210000000 HC MED SURG R&B

## 2020-12-18 PROCEDURE — 71045 X-RAY EXAM CHEST 1 VIEW: CPT

## 2020-12-18 PROCEDURE — 80076 HEPATIC FUNCTION PANEL: CPT

## 2020-12-18 PROCEDURE — 2580000003 HC RX 258: Performed by: INTERNAL MEDICINE

## 2020-12-18 PROCEDURE — 87040 BLOOD CULTURE FOR BACTERIA: CPT

## 2020-12-18 PROCEDURE — 93010 ELECTROCARDIOGRAM REPORT: CPT | Performed by: INTERNAL MEDICINE

## 2020-12-18 PROCEDURE — 80048 BASIC METABOLIC PNL TOTAL CA: CPT

## 2020-12-18 PROCEDURE — 85025 COMPLETE CBC W/AUTO DIFF WBC: CPT

## 2020-12-18 RX ORDER — DEXTROSE MONOHYDRATE 25 G/50ML
12.5 INJECTION, SOLUTION INTRAVENOUS PRN
Status: DISCONTINUED | OUTPATIENT
Start: 2020-12-18 | End: 2020-12-23 | Stop reason: HOSPADM

## 2020-12-18 RX ORDER — NICOTINE POLACRILEX 4 MG
15 LOZENGE BUCCAL PRN
Status: DISCONTINUED | OUTPATIENT
Start: 2020-12-18 | End: 2020-12-23 | Stop reason: HOSPADM

## 2020-12-18 RX ORDER — DEXTROSE MONOHYDRATE 50 MG/ML
100 INJECTION, SOLUTION INTRAVENOUS PRN
Status: DISCONTINUED | OUTPATIENT
Start: 2020-12-18 | End: 2020-12-23 | Stop reason: HOSPADM

## 2020-12-18 RX ORDER — ACETAMINOPHEN 650 MG/1
650 SUPPOSITORY RECTAL EVERY 6 HOURS PRN
Status: DISCONTINUED | OUTPATIENT
Start: 2020-12-18 | End: 2020-12-23 | Stop reason: HOSPADM

## 2020-12-18 RX ORDER — ACETAMINOPHEN 325 MG/1
650 TABLET ORAL EVERY 6 HOURS PRN
Status: DISCONTINUED | OUTPATIENT
Start: 2020-12-18 | End: 2020-12-23 | Stop reason: HOSPADM

## 2020-12-18 RX ORDER — SODIUM CHLORIDE, SODIUM LACTATE, POTASSIUM CHLORIDE, CALCIUM CHLORIDE 600; 310; 30; 20 MG/100ML; MG/100ML; MG/100ML; MG/100ML
INJECTION, SOLUTION INTRAVENOUS CONTINUOUS
Status: DISPENSED | OUTPATIENT
Start: 2020-12-18 | End: 2020-12-19

## 2020-12-18 RX ORDER — 0.9 % SODIUM CHLORIDE 0.9 %
500 INTRAVENOUS SOLUTION INTRAVENOUS ONCE
Status: COMPLETED | OUTPATIENT
Start: 2020-12-18 | End: 2020-12-18

## 2020-12-18 RX ORDER — SODIUM CHLORIDE 0.9 % (FLUSH) 0.9 %
10 SYRINGE (ML) INJECTION PRN
Status: DISCONTINUED | OUTPATIENT
Start: 2020-12-18 | End: 2020-12-23 | Stop reason: HOSPADM

## 2020-12-18 RX ORDER — LEVOFLOXACIN 5 MG/ML
500 INJECTION, SOLUTION INTRAVENOUS ONCE
Status: COMPLETED | OUTPATIENT
Start: 2020-12-18 | End: 2020-12-18

## 2020-12-18 RX ORDER — SODIUM CHLORIDE 0.9 % (FLUSH) 0.9 %
10 SYRINGE (ML) INJECTION EVERY 12 HOURS SCHEDULED
Status: DISCONTINUED | OUTPATIENT
Start: 2020-12-18 | End: 2020-12-23 | Stop reason: HOSPADM

## 2020-12-18 RX ADMIN — ENOXAPARIN SODIUM 30 MG: 30 INJECTION SUBCUTANEOUS at 16:10

## 2020-12-18 RX ADMIN — Medication 10 ML: at 16:11

## 2020-12-18 RX ADMIN — SODIUM CHLORIDE 500 ML: 9 INJECTION, SOLUTION INTRAVENOUS at 11:24

## 2020-12-18 RX ADMIN — SODIUM CHLORIDE, POTASSIUM CHLORIDE, SODIUM LACTATE AND CALCIUM CHLORIDE: 600; 310; 30; 20 INJECTION, SOLUTION INTRAVENOUS at 16:11

## 2020-12-18 RX ADMIN — LEVOFLOXACIN 500 MG: 5 INJECTION, SOLUTION INTRAVENOUS at 11:40

## 2020-12-18 RX ADMIN — VANCOMYCIN HYDROCHLORIDE 1000 MG: 1 INJECTION, POWDER, LYOPHILIZED, FOR SOLUTION INTRAVENOUS at 18:11

## 2020-12-18 RX ADMIN — SODIUM CHLORIDE 500 ML: 9 INJECTION, SOLUTION INTRAVENOUS at 12:16

## 2020-12-18 ASSESSMENT — ENCOUNTER SYMPTOMS
EYE PAIN: 0
BACK PAIN: 0
SHORTNESS OF BREATH: 0
VOMITING: 0
NAUSEA: 0
ABDOMINAL PAIN: 0
EYE DISCHARGE: 0
TROUBLE SWALLOWING: 0
ABDOMINAL DISTENTION: 0
DIARRHEA: 0
SINUS PAIN: 0
SORE THROAT: 0
CHEST TIGHTNESS: 0
COUGH: 0
WHEEZING: 0

## 2020-12-18 NOTE — PROGRESS NOTES
interval if SCr continues to rise. Timing of trough level will be determined based on culture results, renal function, and clinical response. Trough level to be ordered prior to THIRD total dose of vancomycin therapy. Trough level ordered for 12/21/20 @ 1630. Thank you for the consult. Will continue to follow.     Ulisses Downs PharmD   12/18/2020 4:00 PM

## 2020-12-18 NOTE — CARE COORDINATION
[facility] Case Management Initial Discharge Assessment    Met with patient and zuri at bedside in ER to discuss patient's care needs and discharge plan. PCP: Eva Johnson [facility]                           Date of Last Visit: None recent. Patient is also an established patient at [facility] ([phone]). If no PCP, list provided? N/A    Discharge Planning    Living Arrangements: Per family, patient lives at home with his spouse. Patient is dependent for care, however spouse is unable to assist much due to her own physical condition. Patient spends most of his time sitting on the couch. Family reports incontinence and pressure ulcers. Who do you live with? Spouse Jerry Conley ([phone])    Who helps you with your care:  Spouse (unable to provide adequate assistance). If lives at home:  Do you have any barriers navigating in your home? Yes - Per family, patient has significant difficulty navigating his home and ambulation is rare and short-distance when it occurs. Patient has had many recent falls. Patient can perform ADL? No - Needs assist.    Current Services (outpatient and in home) :  [facility] and is eligible for many other services. VA contact info noted below; please contact JOSE Ramos prior to patient's DC. Dialysis: No    Is transportation available to get to your appointments? No, Discussed Options - CM contacted patient's [facility] PACT team; message left to see what services (such as transportation) patient may be eligible for. DME Equipment:  Laura Lovelace, incontinence products. Respiratory equipment: None    Respiratory provider:  ANGELICA     Pharmacy:  Ainsley Henry with Medication Assistance Program?  No      Patient agreeable to [facility]? Yes, [facility] list to be given to family when visiting. Patient agreeable to SNF/Rehab? List to be given to family when visiting.  Patient will likely need SNF at discharge due to care needs and spouse's inability to provide. Other discharge needs identified? Other - CM left message with [facility] to see what services patient was eligible for. VA RN returned call; see notes. Patient VA info: [facility]A [facility] ([phone])         PACT RN: Jeremi Byers - Team 4    [place] of choice list provided with basic dialogue that supports the patient's individualized plan of care/goals and shares the quality data associated with the providers. Yes - To be given to family. Does Patient Have a High-Risk for Readmission Diagnosis (CHF, PN, MI, COPD)? Yes     History: DM, pneumonia, encephalopathy, CKD stage 4, UTI    The plan for Transition of Care is related to the following treatment goals: Eval/mgmt of current illness, PT/OT consult, wound care consult, and assessment of post-acute care needs prior to DC. Initial Discharge Plan? (Note: please see concurrent daily documentation for any updates after initial note). To be determined based on course of patient's illness and recovery. The Patient and/or patient representative: Patient/family in ER were provided with notice of choice of any post-acute providers for care and equipment. CM contacted patient's [facility] PACT team and left message for RN lead to see if patient may be eligible for in-home HHA assistance. ER CM received call back from PACT RN and reported patient's physical condition and need for assistance. PACT RN states that patient is eligible for many services (including in-home HHA assistance) but has refused the services in the past. RN reports patient is currently receiving [facility] visits 3x/wk for wound care of pressure ulcers. Note re: services and [facility] team placed on CM/SW whiteboard.     Electronically signed by Nichol Carter RN on 12/18/2020 at 1:41 PM

## 2020-12-18 NOTE — ED NOTES
Bed: 15  Expected date:   Expected time:   Means of arrival:   Comments:  88yr male - weakness and diarrhea     Harsha Mena RN  12/18/20 [id number]

## 2020-12-18 NOTE — H&P
Hospital Medicine  History and Physical    Patient:  Renny Merlin  MRN: [id number]    CHIEF COMPLAINT:    Chief Complaint   Patient presents with    Fatigue       History Obtained From:  Patient, EMR  Primary Care Physician: Jose Mcadams DO    HISTORY OF PRESENT ILLNESS:   70-year-old male with history of type 2 diabetes, CKD 4 presents from home with 1 day history of weakness-his legs gave out while he was getting up from the toilet. His wife recently had foot surgery and is unable to take care of him. He also admits to chills that have been going on since October. He denies any obvious fever, nausea, or vomiting. He denies chest pain, dyspnea, abdominal pain, change in bowels. He denies any pain with urination but does admit he is going every 2 hours. He has history of MRSA UTI and Klebsiella UTI with bacteremia back in 2019. He also has history of urinary incontinence with chronic hydronephrosis. Past Medical History:      Diagnosis Date    Acute metabolic encephalopathy     Anemia in CKD (chronic kidney disease)     Blind right eye     Chronic hyponatremia     CKD (chronic kidney disease) stage 4, GFR 15-29 ml/min ([facility])     Diabetes mellitus ([address]     Hypertension     Kidney stone     Klebsiella pneumoniae sepsis ([facility])     Mixed hyperlipidemia     Obstructive uropathy     Paroxysmal A-fib ([facility])     Pneumonia     Sacral ulcer ([address]     Urethral stricture     UTI due to Klebsiella species        Past Surgical History:      Procedure Laterality Date    EYE SURGERY      KIDNEY STONE SURGERY  1995       Medications Prior to Admission:    Prior to Admission medications    Medication Sig Start Date End Date Taking?  Authorizing Provider   aspirin 81 MG tablet Take 81 mg by mouth daily    Historical Provider, MD   vitamin B-12 (CYANOCOBALAMIN) 500 MCG tablet Take 500 mcg by mouth daily    Historical Provider, MD   furosemide (LASIX) 20 MG tablet Take 20 mg by mouth 2 times daily Historical Provider, MD   LOSARTAN POTASSIUM PO Take 75 mg by mouth daily Indications: Pt takes 3 (25mg) tabs    Historical Provider, MD   insulin lispro protamine & lispro (HUMALOG MIX 75/25 KWIKPEN) (75-25) 100 UNIT per ML SUPN injection pen Inject 0.15 mLs into the skin 2 times daily (with meals) 7/30/19   Baudilio Means MD   insulin glargine (LANTUS SOLOSTAR) 100 UNIT/ML injection pen Inject 10 Units into the skin nightly 7/29/19   Baudilio Means MD   insulin lispro (HUMALOG KWIKPEN) 100 UNIT/ML pen Glucose 100-120 no insulin, 121-140 2 units, 141-160 4 units, 161- 180 6 units, 181-200 8 units, 200 or higher 10 units 7/29/19   Orlando Holly MD   Insulin Pen Needle (PEN NEEDLES) 31G X 6 MM MISC 1 each by Does not apply route 4 times daily (before meals and nightly) 7/29/19   Baudilio Means MD   amLODIPine (NORVASC) 10 MG tablet Take 1 tablet by mouth daily 7/29/19   Emi Del Angel MD   metoprolol tartrate (LOPRESSOR) 25 MG tablet Take 25 mg by mouth 2 times daily    Historical Provider, MD   atorvastatin (LIPITOR) 20 MG tablet Take 20 mg by mouth daily    Historical Provider, MD       Allergies:  Pcn [penicillins] and Hctz [hydrochlorothiazide]    Social History:   TOBACCO:   reports that he has quit smoking. He has never used smokeless tobacco.  ETOH:   reports previous alcohol use. Family History:   History reviewed. No pertinent family history. REVIEW OF SYSTEMS:  Ten systems reviewed and negative except for stated in HPI    Physical Exam:    Vitals: /65   Pulse 93   Temp 98.4 °F (36.9 °C) (Oral)   Resp 18   Ht 5' 10\" (1.778 m)   Wt 150 lb (68 kg)   SpO2 99%   BMI 21.52 kg/m²   General appearance: Elderly. Very hard of hearing but answers questions appropriately. Follows commands. Oriented to person, place, and time  Skin: Skin color, texture, turgor normal. No rashes or lesions  HEENT: eomi, perrla. MMM  Neck: No JVD or lymphadenopathy  Lungs: CTA bilaterally.  No wheeze   Heart: RRR, no murmur or support as needed  -PT/OT    Anemia suspect of chronic disease: Check iron studies  Type 2 diabetes: SSI  CKD 4  Elevated troponin suspect supply/demand mismatch: Trend  Chronic hydronephrosis  Hypertension  Age-related hearing loss    Reorder home medication when verified by RN  DNR CCA-confirmed with patient on 12/18    I discussed this case with Dr. Esdras Renae (patient's listed PCP) who will take over as attending physician tomorrow.      Uyen Manning DO  Admitting Hospitalist

## 2020-12-18 NOTE — ED TRIAGE NOTES
Pt arrives via EMS from home for c/o weakness and diarrhea. Pt denies diarrhea. Pt states he has been having urinary issues, states he has to urinate \"every 2 hours\". Upon arrival, pt assisted to change into hospital gown. Noted pt has 2 decubitus ulcers to bilat buttcheeks, mepilex applied. Pt also has yellow drainage from penis. Pt denies pain. Pt alert and oriented x 4.

## 2020-12-18 NOTE — ED PROVIDER NOTES
[address] [facility]  EMERGENCY DEPARTMENT ENCOUNTER      Pt Name: Marlon Arrington  MRN: [id number]  Armsjohanagfsai 2/28/1932  Date of evaluation: 12/18/2020  Provider: Jose Meyers DO    CHIEF COMPLAINT       Chief Complaint   Patient presents with    Fatigue         HISTORY OF PRESENT ILLNESS   (Location/Symptom, Timing/Onset, Context/Setting, Quality, Duration, Modifying Factors, Severity)  Note limiting factors. Marlon Arrington is a 80 y.o. male who presents to the emergency department      HPI   Chief complaint: Weakness  History of chief complaint: This 80-year-old gentleman with significant history for diabetes hyponatremia A. fib hypertension hyperkalemia renal insufficiency, urinary tract infection, kidney stone, ataxia with previous falls at home, presented to the emergency department from home complaining of generalized weakness which has been progressively worsening over the past 3 months increased recent patient states he normally uses a walker unable to stand due to weakness, patient reports ongoing problems with urination increased over the past 24 hours with frequent urination every 2 hours saturating depends. Patient denies any associated fevers or chills no abdominal or back pain no chest pain palpitation short of breath weak or dizzy, patient reports chronic leg pains not new or different. eMERGENCY dEPARTMENT eNCOUnter        [facility]    Chief Complaint   Patient presents with    Fatigue       HPI    Marlon Arrington is a 80 y.o. male    REVIEW OF SYSTEMS    See HPI for further details. Review of systems otherwise negative.      PAST MEDICAL HISTORY    Past Medical History:   Diagnosis Date    Acute metabolic encephalopathy     Anemia in CKD (chronic kidney disease)     Blind right eye     Chronic hyponatremia     CKD (chronic kidney disease) stage 4, GFR 15-29 ml/min ([facility])     Diabetes mellitus ([facility]     Hypertension     Kidney stone     [facility]bs[facility]     Mixed hyperlipidemia     Obstructive uropathy     Paroxysmal A-fib (HCC)     Pneumonia     Sacral ulcer ([facility]     Urethral stricture     UTI due to Klebsiella species        SURGICAL HISTORY    Past Surgical History:   Procedure Laterality Date    EYE SURGERY      KIDNEY STONE SURGERY  1995       CURRENT MEDICATIONS    Current Outpatient Rx   Medication Sig Dispense Refill    aspirin 81 MG tablet Take 81 mg by mouth daily      vitamin B-12 (CYANOCOBALAMIN) 500 MCG tablet Take 500 mcg by mouth daily      furosemide (LASIX) 20 MG tablet Take 20 mg by mouth 2 times daily      LOSARTAN POTASSIUM PO Take 75 mg by mouth daily Indications: Pt takes 3 (25mg) tabs      insulin lispro protamine & lispro (HUMALOG MIX 75/25 KWIKPEN) (75-25) 100 UNIT per ML SUPN injection pen Inject 0.15 mLs into the skin 2 times daily (with meals) 5 pen 3    insulin glargine (LANTUS SOLOSTAR) 100 UNIT/ML injection pen Inject 10 Units into the skin nightly 5 pen 3    insulin lispro (HUMALOG KWIKPEN) 100 UNIT/ML pen Glucose 100-120 no insulin, 121-140 2 units, 141-160 4 units, 161- 180 6 units, 181-200 8 units, 200 or higher 10 units 5 pen 3    Insulin Pen Needle (PEN NEEDLES) 31G X 6 MM MISC 1 each by Does not apply route 4 times daily (before meals and nightly) 150 each 3    amLODIPine (NORVASC) 10 MG tablet Take 1 tablet by mouth daily 30 tablet 3    metoprolol tartrate (LOPRESSOR) 25 MG tablet Take 25 mg by mouth 2 times daily      atorvastatin (LIPITOR) 20 MG tablet Take 20 mg by mouth daily         ALLERGIES    Allergies   Allergen Reactions    Pcn [Penicillins]     Hctz [Hydrochlorothiazide] Rash       FAMILY HISTORY    History reviewed. No pertinent family history.     SOCIAL HISTORY    Social History     Socioeconomic History    Marital status:      Spouse name: None    Number of children: None    Years of education: None    Highest education level: None   Occupational History    None   Social Needs    Financial resource strain: None    Food insecurity     Worry: None     Inability: None    Transportation needs     Medical: None     Non-medical: None   Tobacco Use    Smoking status: Former Smoker    Smokeless tobacco: Never Used   Substance and Sexual Activity    Alcohol use: Not Currently    Drug use: Never    Sexual activity: None   Lifestyle    Physical activity     Days per week: None     Minutes per session: None    Stress: None   Relationships    Social connections     Talks on phone: None     Gets together: None     Attends [employer] service: None     Active member of club or organization: None     Attends meetings of clubs or organizations: None     Relationship status: None    Intimate partner violence     Fear of current or ex partner: None     Emotionally abused: None     Physically abused: None     Forced sexual activity: None   Other Topics Concern    None   Social History Narrative    None          Nursing Notes were reviewed. REVIEW OF SYSTEMS    (2-9 systems for level 4, 10 or more for level 5)     Review of Systems   Constitutional: Negative for activity change, chills and fever. HENT: Negative for congestion, ear pain, sinus pain, sore throat and trouble swallowing. Eyes: Negative for pain, discharge and visual disturbance. Respiratory: Negative for cough, chest tightness, shortness of breath and wheezing. Cardiovascular: Negative for chest pain, palpitations and leg swelling. Gastrointestinal: Negative for abdominal distention, abdominal pain, diarrhea, nausea and vomiting. Endocrine: Negative for polydipsia and polyphagia. Genitourinary: Positive for frequency and urgency. Negative for difficulty urinating, dysuria and flank pain. Musculoskeletal: Negative for back pain and myalgias. Skin: Negative for rash. Neurological: Positive for weakness. Negative for dizziness, numbness and headaches.    Psychiatric/Behavioral: Negative for behavioral problems. All other systems reviewed and are negative. Except as noted above the remainder of the review of systems was reviewed and negative. PHYSICAL EXAM    (up to 7 for level 4, 8 or more for level 5)     ED Triage Vitals [12/18/20 0953]   BP Temp Temp Source Pulse Resp SpO2 Height Weight   125/76 98.4 °F (36.9 °C) Oral 106 18 99 % 5' 10\" (1.778 m) 150 lb (68 kg)       Physical Exam  Constitutional:       Appearance: Normal appearance. HENT:      Head: Normocephalic and atraumatic. Mouth/Throat:      Mouth: Mucous membranes are moist.      Pharynx: Oropharynx is clear. Eyes:      Extraocular Movements: Extraocular movements intact. Conjunctiva/sclera: Conjunctivae normal.      Comments: Chronic changes to the right pupil and cornea noted   Neck:      Musculoskeletal: No neck rigidity or muscular tenderness. Cardiovascular:      Rate and Rhythm: Normal rate and regular rhythm. Pulses: Normal pulses. Heart sounds: No murmur. Pulmonary:      Effort: No respiratory distress. Breath sounds: No wheezing, rhonchi or rales. Abdominal:      General: Abdomen is flat. Bowel sounds are normal. There is no distension. Palpations: Abdomen is soft. There is no mass. Tenderness: There is no abdominal tenderness. There is no guarding or rebound. Musculoskeletal:         General: No swelling or tenderness. Left lower leg: No edema. Lymphadenopathy:      Cervical: No cervical adenopathy. Skin:     General: Skin is warm and dry. Capillary Refill: Capillary refill takes less than 2 seconds. Comments: Positive sacral decubitus ulcer   Neurological:      General: No focal deficit present. Mental Status: He is alert.      Genital examination: Normal male genitalia, mild urethral irritation and thick appearing urine discharge noted in the depends, there is no gross testicular erythema swelling no pain on palpation or gross palpable masses. DIAGNOSTIC RESULTS     EKG: All EKG's are interpreted by the Emergency Department Physician who either signs or Co-signs this chart in the absence of a cardiologist.    EKG interpreted by ED physician indication weakness: Sinus tachycardia 100 with right bundle branch block diffuse nonspecific ST-T changes appears similar when compared to previous EKG of 10/3/2019    RADIOLOGY:   Non-plain film images such as CT, Ultrasound and MRI are read by the radiologist. Plain radiographic images are visualized and preliminarily interpreted by the emergency physician with the below findings:        Interpretation per the Radiologist below, if available at the time of this note:    XR CHEST PORTABLE    (Results Pending)         ED BEDSIDE ULTRASOUND:   Performed by ED Physician - none    LABS:  Labs Reviewed   BASIC METABOLIC PANEL - Abnormal; Notable for the following components:       Result Value    Sodium 129 (*)     Potassium 5.3 (*)     CO2 15 (*)     Glucose 191 (*)     BUN 70 (*)     CREATININE 2.91 (*)     GFR Non- 20.5 (*)     GFR  24.8 (*)     All other components within normal limits   CBC - Abnormal; Notable for the following components:    WBC 18.2 (*)     RBC 3.11 (*)     Hemoglobin 8.6 (*)     Hematocrit 27.6 (*)     MCHC 31.3 (*)     RDW 15.1 (*)     Platelets 074 (*)     All other components within normal limits   TROPONIN - Abnormal; Notable for the following components:    Troponin 0.035 (*)     All other components within normal limits    Narrative:     CALL  Leslie  [facility] tel. [phone],  Troponin results called to and read back by JOSE Marmolejo, 12/18/2020 11:04,  by GLENN   CULTURE, URINE   CULTURE, BLOOD 1   CULTURE, BLOOD 2   COVID-19   LACTATE, SEPSIS   URINALYSIS   LACTATE, SEPSIS   CBC WITH AUTO DIFFERENTIAL   HEPATIC FUNCTION PANEL       All other labs were within normal range or not returned as of this dictation.     EMERGENCY DEPARTMENT COURSE and DIFFERENTIAL DIAGNOSIS/MDM:   Vitals:    Vitals:    12/18/20 1015 12/18/20 1030 12/18/20 1045 12/18/20 1111   BP:  130/68 117/60 117/62   Pulse:    99   Resp:    18   Temp:       TempSrc:       SpO2: 100% 100% 99% 98%   Weight:       Height:         Laboratory findings: Patient with significant leukocytosis at 18.2, anemia of 8.6 which is decreased from previous testing 3 months ago but similar to 6 months ago. BMP does reveal a metabolic acidosis with a CO2 of 15 mild hyponatremia at 129 chronic renal insufficiency with some increased dehydration with BUN/creatinine of 70 and 2.9. Initial troponin 0 0.35, lactic acid was ordered and pending blood cultures as well as urine cultures were ordered Covid screening is negative urinalysis is pending    MDM   Treatment and course: Patient had an IV established normal saline 500 mL IV bolus was initiated. Repeat normal saline 500 mL IV bolus was given with findings consistent with dehydration Levaquin 500 mg IV was initiated with a leukocytosis suspect urinary etiology urine results pending. Repeat vital signs are improved blood pressure stable throughout initial mild tachycardia improved with the initial fluid bolus. Patient resting comfortably no distress, straight cath for urine was attempted with resistance in the urethra the patient tolerated poorly, held times the urinal placed awaiting urine sample      REASSESSMENT     ED Course as of Dec 18 1209   Fri Dec 18, 2020   1135 Troponin(!!): 0.035 [KB]      ED Course User Index  [KB] Gio Luna,          CRITICAL CARE TIME   Total Critical Care time was 0 minutes, excluding separately reportable procedures. There was a high probability of clinically significant/life threatening deterioration in the patient's condition which required my urgent intervention. CONSULTS:  None    PROCEDURES:  Unless otherwise noted below, none     Procedures        FINAL IMPRESSION      1. General weakness    2.  Dehydration 3. Acute cystitis without hematuria    4. Troponin level elevated    5. Metabolic acidosis    6. Anemia, unspecified type    7. Chronic kidney disease, unspecified CKD stage          DISPOSITION/PLAN   DISPOSITION      Coordination of CARE: A call was placed out to internal medicine I spoke with Dr. Lyly Justin who will admit the patient for further evaluation and care. Patient awaiting bed placement in stable condition    PATIENT REFERRED TO:  No follow-up provider specified. DISCHARGE MEDICATIONS:  New Prescriptions    No medications on file     Controlled Substances Monitoring:     No flowsheet data found.     (Please note that portions of this note were completed with a voice recognition program.  Efforts were made to edit the dictations but occasionally words are mis-transcribed.)    Jose Meyers DO (electronically signed)  Attending Emergency Physician         Jose Meyers DO  12/18/20 [address] [place] [address]  12/18/20 Count includes the [facility]

## 2020-12-18 NOTE — ACP (ADVANCE CARE PLANNING)
Advance Care Planning     Advance Care Planning Activator (Inpatient)  Conversation Note      Date of ACP Conversation: 12/18/2020    Conversation Conducted with: Patient with Decision Making Capacity in ER with granddaughter at bedside. ACP Activator: Zahra Ramos    *When Decision Maker makes decisions on behalf of the incapacitated patient: Decision Maker is asked to consider and make decisions based on patient values, known preferences, or best interests. Health Care Decision Maker:     Current Designated Health Care Decision Maker:      Primary Decision Maker: Miguel A Arizmendi - Spouse - [phone]    Secondary Decision Maker: August Suresh - Child - [phone]  (If there is a valid Health Care Decision Maker named in the \"Healthcare Decision Makers\" box in the ACP activity, but it is not visible above, be sure to open that field and then select the health care decision maker relationship (ie \"primary\") in the blank space to the right of the name.) Validate  this information as still accurate & up-to-date; edit Needly 8 field as needed.)    Note: Assess and validate information in current ACP documents, as indicated. Note: If the relationship of these Decision-Makers to the patient does NOT follow your state's Next of Kin hierarchy, recommend that patient complete ACP document that meets state-specific requirements to allow them to act on the patient's behalf when appropriate. Care Preferences    Ventilation: \"If you were in your present state of health and suddenly became very ill and were unable to breathe on your own, what would your preference be about the use of a ventilator (breathing machine) if it were available to you? \"      Would the patient desire the use of ventilator (breathing machine)?: yes    \"If your health worsens and it becomes clear that your chance of recovery is unlikely, what would your preference be about the use of a ventilator (breathing machine) if it were available to you? \"     Would the patient desire the use of ventilator (breathing machine)?: No      Resuscitation  \"CPR works best to restart the heart when there is a sudden event, like a heart attack, in someone who is otherwise healthy. Unfortunately, CPR does not typically restart the heart for people who have serious health conditions or who are very sick. \"    \"In the event your heart stopped as a result of an underlying serious health condition, would you want attempts to be made to restart your heart (answer \"yes\" for attempt to resuscitate) or would you prefer a natural death (answer \"no\" for do not attempt to resuscitate)? \" yes      NOTE: If the patient has a valid advance directive AND now provides care preference(s) that are inconsistent with that prior directive, advise the patient to consider either: creating a new advance directive that complies with state-specific requirements; or, if that is not possible, orally revoking that prior directive in accordance with state-specific requirements, which must be documented in the EHR. [] Yes   [] No   Educated Patient / Sheyla Arellano regarding differences between Advance Directives and portable DNR orders.     Length of ACP Conversation in minutes:      Conversation Outcomes:  [x] ACP discussion completed  [] Existing advance directive reviewed with patient; no changes to patient's previously recorded wishes  [] New Advance Directive completed  [] Portable Do Not Rescitate prepared for Provider review and signature  [] POLST/POST/MOLST/MOST prepared for Provider review and signature      Follow-up plan:    [] Schedule follow-up conversation to continue planning  [] Referred individual to Provider for additional questions/concerns   [] Advised patient/agent/surrogate to review completed ACP document and update if needed with changes in condition, patient preferences or care setting    [] This note routed to one or more involved healthcare providers NOTE: Per patient/family, there are no current Advance Directives. CM attempted to call patient's daughter; no answer and message left.  CM also left message with patient's [facility] team.

## 2020-12-19 LAB
AMORPHOUS: ABNORMAL
ANION GAP SERPL CALCULATED.3IONS-SCNC: 11 MEQ/L (ref 9–15)
BACTERIA: ABNORMAL /HPF
BASOPHILS ABSOLUTE: 0.1 K/UL (ref 0–0.2)
BASOPHILS RELATIVE PERCENT: 0.4 %
BILIRUBIN URINE: NEGATIVE
BLOOD, URINE: ABNORMAL
BUN BLDV-MCNC: 58 MG/DL (ref 8–23)
CALCIUM SERPL-MCNC: 8.5 MG/DL (ref 8.5–9.9)
CHLORIDE BLD-SCNC: 102 MEQ/L (ref 95–107)
CLARITY: ABNORMAL
CO2: 13 MEQ/L (ref 20–31)
COLOR: ABNORMAL
CREAT SERPL-MCNC: 2.67 MG/DL (ref 0.7–1.2)
EOSINOPHILS ABSOLUTE: 0 K/UL (ref 0–0.7)
EOSINOPHILS RELATIVE PERCENT: 0.2 %
EPITHELIAL CELLS, UA: ABNORMAL /HPF
FERRITIN: 198 NG/ML (ref 30–400)
GFR AFRICAN AMERICAN: 27.4
GFR NON-AFRICAN AMERICAN: 22.7
GLUCOSE BLD-MCNC: 202 MG/DL (ref 70–99)
GLUCOSE BLD-MCNC: 205 MG/DL (ref 60–115)
GLUCOSE BLD-MCNC: 214 MG/DL (ref 60–115)
GLUCOSE BLD-MCNC: 246 MG/DL (ref 60–115)
GLUCOSE BLD-MCNC: 304 MG/DL (ref 60–115)
GLUCOSE URINE: NEGATIVE MG/DL
HBA1C MFR BLD: 9.2 % (ref 4.8–5.9)
HCT VFR BLD CALC: 22.9 % (ref 42–52)
HEMOGLOBIN: 7.4 G/DL (ref 14–18)
IRON SATURATION: 11 % (ref 11–46)
IRON: 14 UG/DL (ref 59–158)
KETONES, URINE: NEGATIVE MG/DL
LEUKOCYTE ESTERASE, URINE: ABNORMAL
LYMPHOCYTES ABSOLUTE: 2.2 K/UL (ref 1–4.8)
LYMPHOCYTES RELATIVE PERCENT: 13.7 %
MCH RBC QN AUTO: 28.3 PG (ref 27–31.3)
MCHC RBC AUTO-ENTMCNC: 32.2 % (ref 33–37)
MCV RBC AUTO: 87.8 FL (ref 80–100)
MONOCYTES ABSOLUTE: 1.2 K/UL (ref 0.2–0.8)
MONOCYTES RELATIVE PERCENT: 7.7 %
MUCUS: PRESENT /LPF
NEUTROPHILS ABSOLUTE: 12.3 K/UL (ref 1.4–6.5)
NEUTROPHILS RELATIVE PERCENT: 78 %
NITRITE, URINE: NEGATIVE
PDW BLD-RTO: 15.2 % (ref 11.5–14.5)
PERFORMED ON: ABNORMAL
PH UA: 8 (ref 5–9)
PLATELET # BLD: 470 K/UL (ref 130–400)
POTASSIUM REFLEX MAGNESIUM: 4.6 MEQ/L (ref 3.4–4.9)
PROTEIN UA: 100 MG/DL
RBC # BLD: 2.61 M/UL (ref 4.7–6.1)
RBC UA: ABNORMAL /HPF (ref 0–2)
SODIUM BLD-SCNC: 126 MEQ/L (ref 135–144)
SPECIFIC GRAVITY UA: 1.01 (ref 1–1.03)
T3 FREE: 1.5 PG/ML (ref 2–4.4)
T4 FREE: 1.19 NG/DL (ref 0.84–1.68)
TOTAL IRON BINDING CAPACITY: 128 UG/DL (ref 178–450)
TSH REFLEX: 0.07 UIU/ML (ref 0.44–3.86)
UROBILINOGEN, URINE: 0.2 E.U./DL
WBC # BLD: 15.8 K/UL (ref 4.8–10.8)
WBC UA: ABNORMAL /HPF (ref 0–5)

## 2020-12-19 PROCEDURE — 84439 ASSAY OF FREE THYROXINE: CPT

## 2020-12-19 PROCEDURE — 84443 ASSAY THYROID STIM HORMONE: CPT

## 2020-12-19 PROCEDURE — 84481 FREE ASSAY (FT-3): CPT

## 2020-12-19 PROCEDURE — 83036 HEMOGLOBIN GLYCOSYLATED A1C: CPT

## 2020-12-19 PROCEDURE — 83550 IRON BINDING TEST: CPT

## 2020-12-19 PROCEDURE — 99221 1ST HOSP IP/OBS SF/LOW 40: CPT | Performed by: INTERNAL MEDICINE

## 2020-12-19 PROCEDURE — 85025 COMPLETE CBC W/AUTO DIFF WBC: CPT

## 2020-12-19 PROCEDURE — 6370000000 HC RX 637 (ALT 250 FOR IP): Performed by: INTERNAL MEDICINE

## 2020-12-19 PROCEDURE — 36415 COLL VENOUS BLD VENIPUNCTURE: CPT

## 2020-12-19 PROCEDURE — 2580000003 HC RX 258: Performed by: INTERNAL MEDICINE

## 2020-12-19 PROCEDURE — 82728 ASSAY OF FERRITIN: CPT

## 2020-12-19 PROCEDURE — 6360000002 HC RX W HCPCS: Performed by: INTERNAL MEDICINE

## 2020-12-19 PROCEDURE — 83540 ASSAY OF IRON: CPT

## 2020-12-19 PROCEDURE — 80048 BASIC METABOLIC PNL TOTAL CA: CPT

## 2020-12-19 PROCEDURE — 1210000000 HC MED SURG R&B

## 2020-12-19 PROCEDURE — 81001 URINALYSIS AUTO W/SCOPE: CPT

## 2020-12-19 RX ORDER — SODIUM BICARBONATE 650 MG/1
650 TABLET ORAL 2 TIMES DAILY
Status: DISCONTINUED | OUTPATIENT
Start: 2020-12-19 | End: 2020-12-20

## 2020-12-19 RX ORDER — ASPIRIN 81 MG/1
81 TABLET, CHEWABLE ORAL DAILY
Status: DISCONTINUED | OUTPATIENT
Start: 2020-12-19 | End: 2020-12-23 | Stop reason: HOSPADM

## 2020-12-19 RX ORDER — SODIUM CHLORIDE 9 MG/ML
INJECTION, SOLUTION INTRAVENOUS CONTINUOUS
Status: DISCONTINUED | OUTPATIENT
Start: 2020-12-19 | End: 2020-12-22

## 2020-12-19 RX ORDER — INSULIN GLARGINE 100 [IU]/ML
10 INJECTION, SOLUTION SUBCUTANEOUS NIGHTLY
Status: DISCONTINUED | OUTPATIENT
Start: 2020-12-19 | End: 2020-12-23 | Stop reason: HOSPADM

## 2020-12-19 RX ORDER — CHOLECALCIFEROL (VITAMIN D3) 125 MCG
500 CAPSULE ORAL DAILY
Status: DISCONTINUED | OUTPATIENT
Start: 2020-12-19 | End: 2020-12-23 | Stop reason: HOSPADM

## 2020-12-19 RX ADMIN — SODIUM BICARBONATE 650 MG: 650 TABLET ORAL at 15:20

## 2020-12-19 RX ADMIN — INSULIN GLARGINE 10 UNITS: 100 INJECTION, SOLUTION SUBCUTANEOUS at 21:45

## 2020-12-19 RX ADMIN — CEFTRIAXONE SODIUM 1 G: 1 INJECTION, POWDER, FOR SOLUTION INTRAMUSCULAR; INTRAVENOUS at 08:18

## 2020-12-19 RX ADMIN — ASPIRIN 81 MG: 81 TABLET, CHEWABLE ORAL at 15:18

## 2020-12-19 RX ADMIN — CYANOCOBALAMIN TAB 500 MCG 500 MCG: 500 TAB at 15:18

## 2020-12-19 RX ADMIN — ENOXAPARIN SODIUM 30 MG: 30 INJECTION SUBCUTANEOUS at 08:17

## 2020-12-19 RX ADMIN — Medication 10 ML: at 08:25

## 2020-12-19 RX ADMIN — SODIUM CHLORIDE: 9 INJECTION, SOLUTION INTRAVENOUS at 15:17

## 2020-12-19 ASSESSMENT — ENCOUNTER SYMPTOMS
GASTROINTESTINAL NEGATIVE: 1
RESPIRATORY NEGATIVE: 1

## 2020-12-19 NOTE — PROGRESS NOTES
Physical Therapy   [id number]    NAME: Va Nolan    : 1932 (80 y.o.)  MRN: [id number]    Account: [de-identified]  Gender: male    PT evaluation and treatment orders received. Chart reviewed. PT eval attempted. Patient declined noting he is still full from breakfast. Requests therapist return at a later time. Nurse notified. Will attempt PT evaluation again at earliest convenience.       Electronically signed by Aaron Nation PT on 20 at 10:24 AM EST

## 2020-12-19 NOTE — PROGRESS NOTES
Renal Progress Note    Assessment and Plan:   80years old male presented to the emergency department for further evaluation and management of lower extremity weakness and difficulty in performing his standard regular ADL instrumental ADL clinical and laboratory assessment at the emergency department did show that the patient does have MRSA and Klebsiella with UTI back in 2019 he does have obstructive uropathy with hydronephrosis and admitted with possible UTI as underlying etiology of the patient presenting complaint also the observed profound anemia could be a major contribution to the patient clinical presentation and symptoms   1. Patient is a chronic kidney disease stage IV the etiology of which is possibly diabetic nephropathy   2. Anemia possible iron deficiency and anemia of chronic kidney disease that may require ZACK   3.   Secondary hyperparathyroidism and hyperphosphatemia required to be checked   4-hemodynamically stable afebrile nonoliguric nonpolyuric with no clinical evidence of increased extracellular fluid volume    Plan  50 cc/h of normal saline patient blood pressure is 106/47 that may help improving kidney function  Adjust antibiotic according to culture  Check PTH intact phosphorus albumin TSH   in addition to daily regular lab        Patient Active Problem List:     Pneumonia     Uncontrolled type 2 diabetes mellitus with hyperglycemia ([facility])     Gram negative sepsis ([address]     Klebsiella pneumoniae sepsis ([address]     Bradycardia     Fall at home     Hyponatremia     Hyperkalemia     Paroxysmal A-fib ([address]     Hypertension     Anemia in CKD (chronic kidney disease)     Hypotension     Ataxic gait     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)     Chronic kidney disease, stage IV (severe) ([facility])     Anemia of chronic renal failure     Weakness      Subjective:   Admit Date: 12/18/2020    Interval History: Patient seen and examined uneventful night alert oriented follow command in no apparent pain or distress preserved appetite denied any dyspnea orthopnea or paroxysmal nocturnal dyspnea or chest pain no nausea emesis or diarrhea no dysuria no near syncope or syncope and no any other organ system related symptoms      Medications:   Scheduled Meds:   aspirin  81 mg Oral Daily    insulin glargine  10 Units Subcutaneous Nightly    vitamin B-12  500 mcg Oral Daily    sodium bicarbonate  650 mg Oral BID    sodium chloride flush  10 mL Intravenous 2 times per day    enoxaparin  30 mg Subcutaneous Daily    cefTRIAXone (ROCEPHIN) IV  1 g Intravenous Q24H    insulin lispro  0-6 Units Subcutaneous TID WC    insulin lispro  0-3 Units Subcutaneous Nightly    vancomycin (VANCOCIN) intermittent dosing (placeholder)   Other RX Placeholder    vancomycin  1,000 mg Intravenous Q36H     Continuous Infusions:   dextrose         CBC:   Recent Labs     12/18/20  1300 12/19/20  0727   WBC 15.2* 15.8*   HGB 7.3* 7.4*   * 470*     CMP:    Recent Labs     12/18/20  1015 12/19/20  0727   * 126*   K 5.3* 4.6    102   CO2 15* 13*   BUN 70* 58*   CREATININE 2.91* 2.67*   GLUCOSE 191* 202*   CALCIUM 9.0 8.5   LABGLOM 20.5* 22.7*     Troponin:   Recent Labs     12/18/20  1904   TROPONINI 0.021*     BNP: No results for input(s): BNP in the last 72 hours. INR: No results for input(s): INR in the last 72 hours. Lipids: No results for input(s): CHOL, LDLDIRECT, TRIG, HDL, AMYLASE, LIPASE in the last 72 hours. Liver:   Recent Labs     12/18/20  1015   AST 15   ALT 18   ALKPHOS 115*   PROT 8.3*   LABALBU 2.7*   BILITOT 0.3     Iron:    Recent Labs     12/19/20  0727   FERRITIN 198.0     Urinalysis: No results for input(s): UA in the last 72 hours.     Objective:   Vitals: BP (!) 106/47   Pulse 102   Temp 99.9 °F (37.7 °C) (Oral)   Resp 16   Ht 5' 10\" (1.778 m)   Wt 150 lb (68 kg)   SpO2 94%   BMI 21.52 kg/m²    Wt Readings from Last 3 Encounters:   12/18/20 150 lb (68 kg)   03/18/20 175 lb (79.4 kg)

## 2020-12-19 NOTE — PROGRESS NOTES
Patient is alert and oriented times two. Patient has no pain at this time. He has sores on his bilateral coccyx which does have mepilex in place. He has no nausea and no vomiting at this time. His urine is very thick and mucous. Doctor is aware and he is incontinent. No new complaints at this time.

## 2020-12-19 NOTE — CONSULTS
Infectious Disease     Patient Name: Kirstin Ding  Date: 12/19/2020  YOB: 1932  Medical Record Number: [id number]      UTI      History of Present Illness:  Chronic hyponatremia chronic renal insufficiency diabetes hypertension kidney stones history of Klebsiella pneumonia sepsis 2019 and urinary tract infection obstructive uropathy atrial fibrillation history of pneumonia        Patient presents with 1 day history of leg weakness difficulty getting up from toilet  Recent foot injury  No fevers chills sweats nausea vomiting    Increased urination    Urinalysis shows significant inflammation  cells per high-power field  White blood cell count is elevated 15,000  Urine from 10/2019 grew MRSA          COVID-19 testing negative      Chest x-ray shows no infiltrate      Review of Systems   Constitutional: Negative for chills and fatigue. HENT: Negative. Respiratory: Negative. Cardiovascular: Negative. Gastrointestinal: Negative. Genitourinary: Negative. Musculoskeletal: Negative.         Review of Systems: All 14 review of systems negative other than as stated above    Social History     Tobacco Use    Smoking status: Former Smoker    Smokeless tobacco: Never Used   Substance Use Topics    Alcohol use: Not Currently    Drug use: Never         Past Medical History:   Diagnosis Date    Acute metabolic encephalopathy     Anemia in CKD (chronic kidney disease)     Blind right eye     Chronic hyponatremia     CKD (chronic kidney disease) stage 4, GFR 15-29 ml/min ([facility])     Diabetes mellitus ([address]     Hypertension     Kidney stone     Klebsiella pneumoniae sepsis (HCC)     Mixed hyperlipidemia     Obstructive uropathy     Paroxysmal A-fib (HCC)     Pneumonia     Sacral ulcer ([address]     Urethral stricture     UTI due to Klebsiella species            Past Surgical History:   Procedure Laterality Date    EYE SURGERY      KIDNEY STONE SURGERY  1995         No current facility-administered medications on file prior to encounter. Current Outpatient Medications on File Prior to Encounter   Medication Sig Dispense Refill    aspirin 81 MG tablet Take 81 mg by mouth daily      vitamin B-12 (CYANOCOBALAMIN) 500 MCG tablet Take 500 mcg by mouth daily      furosemide (LASIX) 20 MG tablet Take 40 mg by mouth daily       insulin lispro protamine & lispro (HUMALOG MIX 75/25 KWIKPEN) (75-25) 100 UNIT per ML SUPN injection pen Inject 0.15 mLs into the skin 2 times daily (with meals) 5 pen 3    insulin glargine (LANTUS SOLOSTAR) 100 UNIT/ML injection pen Inject 10 Units into the skin nightly 5 pen 3    insulin lispro (HUMALOG KWIKPEN) 100 UNIT/ML pen Glucose 100-120 no insulin, 121-140 2 units, 141-160 4 units, 161- 180 6 units, 181-200 8 units, 200 or higher 10 units 5 pen 3    Insulin Pen Needle (PEN NEEDLES) 31G X 6 MM MISC 1 each by Does not apply route 4 times daily (before meals and nightly) 150 each 3       Allergies   Allergen Reactions    Pcn [Penicillins]     Hctz [Hydrochlorothiazide] Rash         History reviewed. No pertinent family history. Physical Exam:      Physical Exam   Constitutional: No distress. Neck: Normal range of motion. Neck supple. Cardiovascular: Normal heart sounds. No murmur heard. Pulmonary/Chest: Effort normal and breath sounds normal. No respiratory distress. He has no wheezes. He has no rales. He exhibits no tenderness. Abdominal: Soft. He exhibits no distension. There is no abdominal tenderness. There is no rebound and no guarding. Musculoskeletal:         General: No tenderness or edema. Neurological: He is alert. Skin: Skin is warm. No rash noted. He is not diaphoretic. No erythema. Blood pressure (!) 106/47, pulse 102, temperature 99.9 °F (37.7 °C), temperature source Oral, resp. rate 16, height 5' 10\" (1.778 m), weight 150 lb (68 kg), SpO2 94 %.       .   Lab Results   Component Value Date    WBC 15.8 (H) 12/19/2020    HGB 7.4 (L) 12/19/2020    HCT 22.9 (L) 12/19/2020    MCV 87.8 12/19/2020     (H) 12/19/2020     Lab Results   Component Value Date     12/19/2020    K 4.6 12/19/2020     12/19/2020    CO2 13 12/19/2020    BUN 58 12/19/2020    CREATININE 2.67 12/19/2020    GLUCOSE 202 12/19/2020    CALCIUM 8.5 12/19/2020          Microscopic Urinalysis [[id number]] (Abnormal) Collected: 12/19/20 1011       Updated: 12/19/20 1204      Mucus, UA Present /LPF       WBC, UA  /HPF       RBC, UA 5-10 /HPF       Epithelial Cells, UA 0-2 /HPF       Bacteria, UA MODERATE /HPF       Amorphous, UA 2+     Hemoglobin A1c [[id number]] (Abnormal) Collected: 12/19/20 0727     Specimen: Blood Updated: 12/19/20 1203      Hemoglobin A1C 9.2 %      Urinalysis [[id number]] (Abnormal) Collected: 12/19/20 1011     Specimen: Urine, clean catch Updated: 12/19/20 1202      Color, UA JUDY      Clarity, UA TURBID      Glucose, Ur Negative mg/dL       Bilirubin Urine Negative      Ketones, Urine Negative mg/dL       Specific Gravity, UA 1.010      Blood, Urine LARGE      pH, UA 8.0      Protein,  mg/dL       Urobilinogen, Urine 0.2 E.U./dL       Nitrite, Urine Negative      Leukocyte Esterase, Urine MODERATE           ASSESSMENT:  Patient Active Problem List   Diagnosis    Pneumonia    Uncontrolled type 2 diabetes mellitus with hyperglycemia ([facility])    Gram negative sepsis ([facility])    Klebsiella pneumoniae sepsis ([facility])    Bradycardia    Fall at home    Hyponatremia    Hyperkalemia    Paroxysmal A-fib ([facility])    Hypertension    Anemia in CKD (chronic kidney disease)    Hypotension    Ataxic gait    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

## 2020-12-19 NOTE — PROGRESS NOTES
Physician Progress Note    12/19/2020   1:32 PM    Name:  Leonila Laureano  MRN:    [id number]     IP Day: 1     Admit Date: 12/18/2020  9:52 AM  PCP: David Krishnan DO    Code Status:  DNR-CCA    Subjective: He would not get up with physical therapy today. Per RN, urine resembled mucus and had foul odor. He denies any complaints.     Current Facility-Administered Medications   Medication Dose Route Frequency Provider Last Rate Last Admin    aspirin tablet 81 mg  81 mg Oral Daily Jeremiah Hampton, DO        insulin glargine (LANTUS;BASAGLAR) injection pen 10 Units  10 Units Subcutaneous Nightly Jeremiah Saniya, DO        vitamin B-12 (CYANOCOBALAMIN) tablet 500 mcg  500 mcg Oral Daily Jeremiah Hampton, DO        sodium bicarbonate tablet 650 mg  650 mg Oral BID Jeremiah Saniya, DO        sodium chloride flush 0.9 % injection 10 mL  10 mL Intravenous 2 times per day Jeremiah Kothari, DO   10 mL at 12/19/20 0825    sodium chloride flush 0.9 % injection 10 mL  10 mL Intravenous PRN Jeremiah Kothari, DO   10 mL at 12/18/20 1611    enoxaparin (LOVENOX) injection 30 mg  30 mg Subcutaneous Daily Jeremiah Saniya, DO   30 mg at 12/19/20 0817    acetaminophen (TYLENOL) tablet 650 mg  650 mg Oral Q6H PRN Jeremiah Saniya, DO        Or    acetaminophen (TYLENOL) suppository 650 mg  650 mg Rectal Q6H PRN Jeremiah Hampton, DO        cefTRIAXone (ROCEPHIN) 1 g IVPB in 50 mL D5W minibag  1 g Intravenous Q24H Jeremiah Kothari, DO   Stopped at 12/19/20 0848    glucose (GLUTOSE) 40 % oral gel 15 g  15 g Oral PRN Jeremiah Hampton, DO        dextrose 50 % IV solution  12.5 g Intravenous PRN Jeremiah Saniya, DO        glucagon (rDNA) injection 1 mg  1 mg Intramuscular PRN Jeremiah Hampton, DO        dextrose 5 % solution  100 mL/hr Intravenous PRN Jeremiah Hampton, DO        insulin lispro (HUMALOG) injection vial 0-6 Units  0-6 Units Subcutaneous TID WC Jeremiah Kothari, DO   2 Units at 12/19/20 1142    insulin lispro (HUMALOG) injection vial 0-3 Units  0-3 Units Subcutaneous Nightly Amy Double, DO   2 Units at 20 2016    vancomycin [facility]) intermittent dosing (placeholder)   Other RX Placeholder Amy Double, DO        vancomycin 1000 mg IVPB in 250 mL D5W addavial  1,000 mg Intravenous Q36H Amy Double, DO   Stopped at 20 1921       Physical Examination:      Vitals:  BP (!) 106/47   Pulse 102   Temp 99.9 °F (37.7 °C) (Oral)   Resp 16   Ht 5' 10\" (1.778 m)   Wt 150 lb (68 kg)   SpO2 94%   BMI 21.52 kg/m²   Temp (24hrs), Av.5 °F (37.5 °C), Min:99.1 °F (37.3 °C), Max:99.9 °F (37.7 °C)      General appearance: Elderly. Very hard of hearing but answers questions appropriately. Follows commands. Oriented to person, place, and time  Skin: Skin color, texture, turgor normal. No rashes or lesions  HEENT: eomi, perrla. MMM  Neck: No JVD or lymphadenopathy  Lungs: CTA bilaterally. No wheeze   Heart: RRR, no murmur or gallp  Abdomen: soft, nontender. Bsx4. No masses or organomegaly  Extremities: no edema, redness, or tenderness in calves. Neurologic: No focal deficits     Data:     Labs:  Recent Labs     20  1300 20  0727   WBC 15.2* 15.8*   HGB 7.3* 7.4*   * 470*     Recent Labs     20  1015 20  0727   * 126*   K 5.3* 4.6    102   CO2 15* 13*   BUN 70* 58*   CREATININE 2.91* 2.67*   GLUCOSE 191* 202*     Recent Labs     20  1015   AST 15   ALT 18   BILITOT 0.3   ALKPHOS 115*       Assessment and Plan:        25-year-old male with history of type 2 diabetes, CKD 4, chronic hydronephrosis, MRSA and klebsiella UTI (2019) presents from home with 1 day history of weakness-his legs gave out while he was getting up from the toilet.     1. Weakness suspect related to sepsis from UTI (wbc 19, HR>90 on admission)  -Follow UA, urine culture, blood cultures  -Empiric ceftriaxone, vancomycin (h/o MRSA in urine).  S/p 1 dose fluoroquinolone in ED  -Noted abnormal TSH: Add on free T3/T4  -Consult infectious disease  -IVF support as needed  -PT/OT     Anemia suspect of chronic disease: Check iron studies  Type 2 diabetes: SSI  CKD 4-add bicarbonate tablet  Elevated troponin suspect supply/demand mismatch: Trend  Chronic hydronephrosis  Hypertension  Age-related hearing loss     DNR CCA-confirmed with patient on 12/18    I discussed this case with Dr. Kerwin Spurling on 12/18-he will take over as the attending when he returns to the hospital.  He is not working this weekend.     Electronically signed by Ari Appiah DO on 12/19/2020 at 1:32 PM

## 2020-12-19 NOTE — PROGRESS NOTES
Pt has been alert and oriented  incont of urine and stool on shift  Foam dressings C/D/I 2 to pressure wounds on bilat buttocks  q2h turns implemented

## 2020-12-20 LAB
ALBUMIN SERPL-MCNC: 1.8 G/DL (ref 3.5–4.6)
ANION GAP SERPL CALCULATED.3IONS-SCNC: 14 MEQ/L (ref 9–15)
BANDED NEUTROPHILS RELATIVE PERCENT: 9 %
BASOPHILS ABSOLUTE: 0.2 K/UL (ref 0–0.2)
BASOPHILS RELATIVE PERCENT: 1 %
BUN BLDV-MCNC: 49 MG/DL (ref 8–23)
CALCIUM SERPL-MCNC: 8 MG/DL (ref 8.5–9.9)
CHLORIDE BLD-SCNC: 104 MEQ/L (ref 95–107)
CO2: 11 MEQ/L (ref 20–31)
CREAT SERPL-MCNC: 2.59 MG/DL (ref 0.7–1.2)
EOSINOPHILS ABSOLUTE: 0 K/UL (ref 0–0.7)
EOSINOPHILS RELATIVE PERCENT: 0.6 %
FERRITIN: 221.9 NG/ML (ref 30–400)
GFR AFRICAN AMERICAN: 28.4
GFR NON-AFRICAN AMERICAN: 23.5
GLUCOSE BLD-MCNC: 194 MG/DL (ref 70–99)
GLUCOSE BLD-MCNC: 206 MG/DL (ref 60–115)
GLUCOSE BLD-MCNC: 281 MG/DL (ref 60–115)
GLUCOSE BLD-MCNC: 291 MG/DL (ref 60–115)
GLUCOSE BLD-MCNC: 339 MG/DL (ref 60–115)
HCT VFR BLD CALC: 22.8 % (ref 42–52)
HEMOGLOBIN: 7.3 G/DL (ref 14–18)
HYPOCHROMIA: ABNORMAL
IRON SATURATION: 14 % (ref 11–46)
IRON: 19 UG/DL (ref 59–158)
LYMPHOCYTES ABSOLUTE: 2.2 K/UL (ref 1–4.8)
LYMPHOCYTES RELATIVE PERCENT: 13 %
MCH RBC QN AUTO: 28.3 PG (ref 27–31.3)
MCHC RBC AUTO-ENTMCNC: 32.2 % (ref 33–37)
MCV RBC AUTO: 87.9 FL (ref 80–100)
MONOCYTES ABSOLUTE: 0.9 K/UL (ref 0.2–0.8)
MONOCYTES RELATIVE PERCENT: 4.8 %
NEUTROPHILS ABSOLUTE: 14.1 K/UL (ref 1.4–6.5)
NEUTROPHILS RELATIVE PERCENT: 73 %
PARATHYROID HORMONE INTACT: 42.4 PG/ML (ref 15–65)
PDW BLD-RTO: 15.1 % (ref 11.5–14.5)
PERFORMED ON: ABNORMAL
PHOSPHORUS: 2.9 MG/DL (ref 2.3–4.8)
PLATELET # BLD: 437 K/UL (ref 130–400)
POTASSIUM REFLEX MAGNESIUM: 4.3 MEQ/L (ref 3.4–4.9)
POTASSIUM SERPL-SCNC: 4.3 MEQ/L (ref 3.4–4.9)
RBC # BLD: 2.59 M/UL (ref 4.7–6.1)
RETICULOCYTE ABSOLUTE COUNT: 0.04 M/CUMM (ref 0.02–0.11)
RETICULOCYTE COUNT PCT: 1.5 % (ref 0.6–2.2)
SODIUM BLD-SCNC: 129 MEQ/L (ref 135–144)
T4 FREE: 1.12 NG/DL (ref 0.84–1.68)
TOTAL IRON BINDING CAPACITY: 139 UG/DL (ref 178–450)
TSH REFLEX: 0.07 UIU/ML (ref 0.44–3.86)
URINE CULTURE, ROUTINE: NORMAL
WBC # BLD: 17.2 K/UL (ref 4.8–10.8)

## 2020-12-20 PROCEDURE — 2580000003 HC RX 258: Performed by: INTERNAL MEDICINE

## 2020-12-20 PROCEDURE — 97162 PT EVAL MOD COMPLEX 30 MIN: CPT

## 2020-12-20 PROCEDURE — 97167 OT EVAL HIGH COMPLEX 60 MIN: CPT

## 2020-12-20 PROCEDURE — 1210000000 HC MED SURG R&B

## 2020-12-20 PROCEDURE — 82728 ASSAY OF FERRITIN: CPT

## 2020-12-20 PROCEDURE — 82040 ASSAY OF SERUM ALBUMIN: CPT

## 2020-12-20 PROCEDURE — 36415 COLL VENOUS BLD VENIPUNCTURE: CPT

## 2020-12-20 PROCEDURE — 83550 IRON BINDING TEST: CPT

## 2020-12-20 PROCEDURE — 85025 COMPLETE CBC W/AUTO DIFF WBC: CPT

## 2020-12-20 PROCEDURE — 84100 ASSAY OF PHOSPHORUS: CPT

## 2020-12-20 PROCEDURE — 84443 ASSAY THYROID STIM HORMONE: CPT

## 2020-12-20 PROCEDURE — 83970 ASSAY OF PARATHORMONE: CPT

## 2020-12-20 PROCEDURE — 84439 ASSAY OF FREE THYROXINE: CPT

## 2020-12-20 PROCEDURE — 6360000002 HC RX W HCPCS: Performed by: INTERNAL MEDICINE

## 2020-12-20 PROCEDURE — 85046 RETICYTE/HGB CONCENTRATE: CPT

## 2020-12-20 PROCEDURE — 6370000000 HC RX 637 (ALT 250 FOR IP): Performed by: INTERNAL MEDICINE

## 2020-12-20 PROCEDURE — 80048 BASIC METABOLIC PNL TOTAL CA: CPT

## 2020-12-20 PROCEDURE — 83540 ASSAY OF IRON: CPT

## 2020-12-20 RX ORDER — SODIUM BICARBONATE 650 MG/1
1300 TABLET ORAL 2 TIMES DAILY
Status: DISCONTINUED | OUTPATIENT
Start: 2020-12-20 | End: 2020-12-22

## 2020-12-20 RX ADMIN — ASPIRIN 81 MG: 81 TABLET, CHEWABLE ORAL at 08:25

## 2020-12-20 RX ADMIN — IRON SUCROSE 200 MG: 20 INJECTION, SOLUTION INTRAVENOUS at 15:15

## 2020-12-20 RX ADMIN — CYANOCOBALAMIN TAB 500 MCG 500 MCG: 500 TAB at 08:25

## 2020-12-20 RX ADMIN — CEFTRIAXONE SODIUM 1 G: 1 INJECTION, POWDER, FOR SOLUTION INTRAMUSCULAR; INTRAVENOUS at 08:30

## 2020-12-20 RX ADMIN — SODIUM BICARBONATE 650 MG: 650 TABLET ORAL at 08:25

## 2020-12-20 RX ADMIN — SODIUM CHLORIDE: 9 INJECTION, SOLUTION INTRAVENOUS at 14:06

## 2020-12-20 RX ADMIN — ENOXAPARIN SODIUM 30 MG: 30 INJECTION SUBCUTANEOUS at 08:25

## 2020-12-20 RX ADMIN — VANCOMYCIN HYDROCHLORIDE 1000 MG: 1 INJECTION, POWDER, LYOPHILIZED, FOR SOLUTION INTRAVENOUS at 05:38

## 2020-12-20 RX ADMIN — INSULIN GLARGINE 10 UNITS: 100 INJECTION, SOLUTION SUBCUTANEOUS at 21:20

## 2020-12-20 ASSESSMENT — PAIN SCALES - GENERAL
PAINLEVEL_OUTOF10: 0

## 2020-12-20 NOTE — PROGRESS NOTES
Physical Therapy Med Surg Initial Assessment  Facility/Department: [facility]  Room: [facility]       NAME: Va Nolan  : 1932 (80 y.o.)  MRN: [id number]  CODE STATUS: DNR-CCA    Date of Service: 2020    Patient Diagnosis(es): Weakness [R53.1]   Chief Complaint   Patient presents with    Fatigue     Patient Active Problem List    Diagnosis Date Noted    Weakness 2020    Chronic kidney disease, stage IV (severe) ([address] 2020    Anemia of chronic renal failure 2020    Ataxic gait     Dementia due to Alzheimer's disease ([address]     Bradycardia 10/03/2019    Fall at home 10/03/2019    Hyponatremia 10/03/2019    Hyperkalemia 10/03/2019    Hypotension 10/03/2019    Paroxysmal A-fib ([address]     Hypertension     Anemia in CKD (chronic kidney disease)     Gram negative sepsis (HCC)     Klebsiella pneumoniae sepsis (HCC)     Uncontrolled type 2 diabetes mellitus with hyperglycemia ([address]     Pneumonia 2019        Past Medical History:   Diagnosis Date    Acute metabolic encephalopathy     Anemia in CKD (chronic kidney disease)     Blind right eye     Chronic hyponatremia     CKD (chronic kidney disease) stage 4, GFR 15-29 ml/min (HCC)     Diabetes mellitus ([address]     Hypertension     Kidney stone     Klebsiella pneumoniae sepsis (HCC)     Mixed hyperlipidemia     Obstructive uropathy     Paroxysmal A-fib (HCC)     Pneumonia     Sacral ulcer ([address]     Urethral stricture     UTI due to Klebsiella species      Past Surgical History:   Procedure Laterality Date    EYE SURGERY      KIDNEY STONE SURGERY         Chart Reviewed: Yes  Patient assessed for rehabilitation services?: Yes  Family / Caregiver Present: No  General Comment  Comments: pt agreeable to PT eval    Restrictions:  Restrictions/Precautions: Fall Risk, Contact Precautions(high lopez score; MRSA)     SUBJECTIVE:      Pain  Pre Treatment Pain Screening  Pain at present: 0  Intervention List: Patient able to continue with treatment    Post Treatment Pain Screening:   Pain Assessment  Pain Level: 0    Prior Level of Function:  Social/Functional History  Lives With: Spouse  Type of Home: Apartment  Home Layout: Two level(12 steps to second floor)  Home Access: (pt unable to answer)  Bathroom Shower/Tub: Tub/Shower unit  [employer]: Standard  Bathroom Equipment: Grab bars in shower, Tub transfer bench  Home Equipment: Rolling walker, Standard walker  Receives Help From: Home health(wife has broken ankle, normally would be helper)  ADL Assistance: Needs assistance(pt receiving home health services, but poor historian and unable to describe assistance levels.)  Bath: Unable to assess(comment)  Dressing: Unable to assess(comment)  Grooming: Unable to assess(comment)  Toileting: Needs assistance  Homemaking Assistance: Needs assistance(home health assist as wife has broken ankle and unable to help)  Homemaking Responsibilities: No  Ambulation Assistance: Needs assistance(no AD; wife amb with pt)  Transfer Assistance: Needs assistance  Active : No  Additional Comments: pt is questionable historian    OBJECTIVE:   Vision: Impaired  Vision Exceptions: Legally blind(artificial right eye secondary to diabetes complications, glasses for left eye but pt reports losing them and never getting replacements.)  Hearing: Exceptions to [facility]  Hearing Exceptions: Hard of hearing/hearing concerns    Cognition:  Overall Orientation Status: Impaired  Orientation Level: Oriented to person, Disoriented to time, Disoriented to place, Disoriented to situation  Follows Commands: Within Functional Limits    Observation/Palpation  Observation: pt stated he vomited prior to PT eval- nursing stated ok for tx; IV; pt exhibiting SOB with talking and minimal exertion- SPO2 98% on RA- nursing aware    ROM:  RLE AROM: WFL  LLE AROM : WFL    Strength:  Strength RLE  Comment: grossly 4/5  Strength LLE  Comment: grossly 4/5    Neuro:  Balance  Posture: Fair(slouched posture in seated and trunk flexion in standing)  Sitting - Static: Good;-  Sitting - Dynamic: Fair;+(requires CGA to complete seated lateral scooting edge of bed without back support)  Standing - Static: Fair;-(pt requires mod A and heavy use of UEs on AD to avoid falls)  Standing - Dynamic: (not tested due to increased assistance for static standing)     Tone RLE  RLE Tone: Normotonic  Tone LLE  LLE Tone: Normotonic  Motor Control  Gross Motor?: Exceptions(whole body tremors moving from supine to sitting)  Sensation  Overall Sensation Status: WNL    Bed mobility  Supine to Sit: Maximum assistance  Sit to Supine: Maximum assistance  Comment: HOB elevated; transfer completed toward the R side of bed; R rail used    Transfers  Sit to Stand: Moderate Assistance  Stand to sit: Moderate Assistance  Comment: [employer]; pt exhibiting pushing up from seated surface without cues; static standing with knee and trunk flexion and mod A to maintain safe static standing position with [employer]    Ambulation  Ambulation?: No(mod A for static standing therefore gait unsafe to trial this date)              Activity Tolerance  Activity Tolerance: Patient limited by fatigue;Patient limited by endurance          PT Education  PT Education: Goals;PT Role;Plan of Care;General Safety;Transfer Training    ASSESSMENT:   Body structures, Functions, Activity limitations: Decreased functional mobility ; Decreased cognition;Decreased posture;Decreased endurance;Decreased coordination;Decreased strength;Decreased balance;Decreased safe awareness  Decision Making: Medium Complexity  History: high  Exam: med  Clinical Presentation: med    Prognosis: Good    DISCHARGE RECOMMENDATIONS:  Discharge Recommendations: Continue to assess pending progress, Patient would benefit from continued therapy after discharge    Assessment: Pt exhibits decreased cognition including decreased safety awareness and decreased insight into deficits, whole body tremors, increased SOB with talking and minimal exertion during transfers, decreased seated and standing balance, and increased assistance during all functional mobility therefore requiring continued therapy for safe d/c home with wife. REQUIRES PT FOLLOW UP: Yes      PLAN OF CARE:  Plan  Times per week: 3-6  Current Treatment Recommendations: Strengthening, Transfer Training, Endurance Training, Cognitive Reorientation, Patient/Caregiver Education & Training, ROM, Manual Therapy - Soft Tissue Mobilization, Pain Management, Equipment Evaluation, Education, & procurement, Balance Training, Gait Training, Home Exercise Program, Modalities, Functional Mobility Training, Stair training, Safety Education & Training  Safety Devices  Type of devices: Bed alarm in place, Call light within reach, Left in bed    Goals:  Patient goals : \"go home with my wife\"  Long term goals  Long term goal 1: Pt will demonstrate bed mobility SBA for return to prior level of function. Long term goal 2: Pt will demonstrate transfers SBA with WW or safest AD to allow for safe functional mobility. Long term goal 3: Pt will demonstrate amb 150ft with WW or safest AD SBA to allow for safe amb inside pt's home  Long term goal 4: Pt will demonstrate stair negotiation 1 flight with 1 rail SBA to allow pt to go upstairs to bedroom. [facility] (6 CLICK) BASIC MOBILITY  AM-PAC Inpatient Mobility Raw Score : 10    Therapy Time:   Individual   Time In 7247   Time Out 1501   Minutes [address], [address] [facility], 12/20/20 at 3:18 PM         Definitions for assistance levels  Independent = pt does not require any physical supervision or assistance from another person for activity completion. Device may be needed.   Stand by assistance = pt requires verbal cues or instructions from another person, close to but not touching, to perform the activity  Minimal assistance= pt performs 75% or more of the activity; assistance

## 2020-12-20 NOTE — PROGRESS NOTES
Renal Progress Note    Assessment and Plan:    80years old male presented to the emergency department for further evaluation and management of lower extremity weakness and difficulty in performing his standard regular ADL instrumental ADL clinical and laboratory assessment at the emergency department did show that the patient does have MRSA and Klebsiella with UTI back in 2019 he does have obstructive uropathy with hydronephrosis and admitted with possible UTI as underlying etiology of the patient presenting complaint also the observed profound anemia could be a major contribution to the patient clinical presentation and symptoms   1. Patient is a chronic kidney disease stage IV the etiology of which is possibly diabetic nephropathy   2. Anemia  iron deficiency and anemia of chronic kidney disease that may require ZACK   3.   Secondary hyperparathyroidism and hyperphosphatemia required to be checked   4-hemodynamically stable afebrile nonoliguric nonpolyuric with no clinical evidence of increased extracellular fluid volume     Plan  Continue 50 cc/h of normal saline patient blood pressure is proving that may eventually help improving kidney function  Adjust antibiotic according to culture so far no growth  Check PTH intact phosphorus albumin TSH   in addition to daily regular lab  Work patient on ZACK      Patient Active Problem List:     Pneumonia     Uncontrolled type 2 diabetes mellitus with hyperglycemia ([facility])     Gram negative sepsis ([address]     Klebsiella pneumoniae sepsis ([address]     Bradycardia     Fall at home     Hyponatremia     Hyperkalemia     Paroxysmal A-fib ([address]     Hypertension     Anemia in CKD (chronic kidney disease)     Hypotension     Ataxic gait     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)     Chronic kidney disease, stage IV (severe) ([facility])     Anemia of chronic renal failure     Weakness      Subjective:   Admit Date: 12/18/2020    Interval History: Seen and examined uneventful night pleasant denied any complain denied any uremic related or fluid volume overload related symptoms continue to have diminished appetite and generalized weakness    Medications:   Scheduled Meds:   sodium bicarbonate  1,300 mg Oral BID    aspirin  81 mg Oral Daily    insulin glargine  10 Units Subcutaneous Nightly    vitamin B-12  500 mcg Oral Daily    sodium chloride flush  10 mL Intravenous 2 times per day    enoxaparin  30 mg Subcutaneous Daily    cefTRIAXone (ROCEPHIN) IV  1 g Intravenous Q24H    insulin lispro  0-6 Units Subcutaneous TID WC    insulin lispro  0-3 Units Subcutaneous Nightly    vancomycin (VANCOCIN) intermittent dosing (placeholder)   Other RX Placeholder    vancomycin  1,000 mg Intravenous Q36H     Continuous Infusions:   sodium chloride 50 mL/hr at 12/19/20 1517    dextrose         CBC:   Recent Labs     12/19/20  0727 12/20/20  0722   WBC 15.8* 17.2*   HGB 7.4* 7.3*   * 437*     CMP:    Recent Labs     12/18/20  1015 12/19/20  0727 12/20/20  0722   * 126* 129*   K 5.3* 4.6 4.3  4.3    102 104   CO2 15* 13* 11*   BUN 70* 58* 49*   CREATININE 2.91* 2.67* 2.59*   GLUCOSE 191* 202* 194*   CALCIUM 9.0 8.5 8.0*   LABGLOM 20.5* 22.7* 23.5*     Troponin:   Recent Labs     12/18/20  1904   TROPONINI 0.021*     BNP: No results for input(s): BNP in the last 72 hours. INR: No results for input(s): INR in the last 72 hours. Lipids: No results for input(s): CHOL, LDLDIRECT, TRIG, HDL, AMYLASE, LIPASE in the last 72 hours. Liver:   Recent Labs     12/18/20  1015 12/20/20  0722   AST 15  --    ALT 18  --    ALKPHOS 115*  --    PROT 8.3*  --    LABALBU 2.7* 1.8*   BILITOT 0.3  --      Iron:    Recent Labs     12/20/20  0722   FERRITIN 221.9     Urinalysis: No results for input(s): UA in the last 72 hours.     Objective:   Vitals: BP (!) 139/55   Pulse 99   Temp 97.7 °F (36.5 °C) (Oral)   Resp 20   Ht 5' 10\" (1.778 m)   Wt 150 lb (68 kg)   SpO2 99%   BMI 21.52 kg/m²    Wt Readings from Last 3 Encounters:   12/18/20 150 lb (68 kg)   03/18/20 175 lb (79.4 kg)   03/04/20 186 lb (84.4 kg)      24HR INTAKE/OUTPUT:      Intake/Output Summary (Last 24 hours) at 12/20/2020 1258  Last data filed at 12/20/2020 0855  Gross per 24 hour   Intake 1363 ml   Output --   Net 1363 ml       Constitutional:  Alert, awake, no apparent distress   Skin:normal, no rash  HEENT:sclera anicteric.   Head atraumatic normocephalic  Neck:supple with no thyromegally  Cardiovascular:  S1, S2 without m/r/g   Respiratory:  CTA B without w/r/r diminished air entry bibasilar  Abdomen: +bs, soft, nt  Ext: No LE edema or sacral dependent edema  Musculoskeletal:Intact  Neuro:Alert and oriented with no deficit      Electronically signed by Jen Rose MD on 12/20/2020 at 12:58 PM

## 2020-12-20 NOTE — CARE COORDINATION
Phoned wife, she states that she hadn't really thought about a facility for rehab/snf because she wants her  to come home with hospice. She states she has been in contact with a man named Christiano from hospice. She states [place] works with [employer]. She states his number is [phone]. She states she has a broken foot. She states \"I have been  to my  for 21 years and I don't want to see him keep suffering\". Discussed my conversation with RN Cordell Kerns and she to reach out to wife Frantz Bang to confirm/clarify wishes. This CM has not contacted physician for consult at this time. Waiting for further clarification.

## 2020-12-20 NOTE — PROGRESS NOTES
Pt turned and repositioned q 2 hours. Pt is incontinent of urine. meds given per orders. mepelex to sacral buttocks area CDI. Fall precautions in place. Hourly rounds continue. Call light in reach.

## 2020-12-20 NOTE — PROGRESS NOTES
[facility] OCCUPATIONAL THERAPY EVALUATION - ACUTE     NAME: Samia Tejada  : 1932 (80 y.o.)  MRN: [id number]  CODE STATUS: DNR-CCA  Room: V194/R443-13    Date of Service: 2020    Patient Diagnosis(es): Weakness [R53.1]   Chief Complaint   Patient presents with    Fatigue     Patient Active Problem List    Diagnosis Date Noted    Weakness 2020    Chronic kidney disease, stage IV (severe) ([facility]) 2020    Anemia of chronic renal failure 2020    Ataxic gait     Dementia due to Alzheimer's disease ([facility])     Bradycardia 10/03/2019    Fall at home 10/03/2019    Hyponatremia 10/03/2019    Hyperkalemia 10/03/2019    Hypotension 10/03/2019    Paroxysmal A-fib ([facility])     Hypertension     Anemia in CKD (chronic kidney disease)     Gram negative sepsis (HCC)     Klebsiella pneumoniae sepsis (HCC)     Uncontrolled type 2 diabetes mellitus with hyperglycemia ([facility])     Pneumonia 2019        Past Medical History:   Diagnosis Date    Acute metabolic encephalopathy     Anemia in CKD (chronic kidney disease)     Blind right eye     Chronic hyponatremia     CKD (chronic kidney disease) stage 4, GFR 15-29 ml/min (HCC)     Diabetes mellitus ([facility])     Hypertension     Kidney stone     Klebsiella pneumoniae sepsis (HCC)     Mixed hyperlipidemia     Obstructive uropathy     Paroxysmal A-fib (HCC)     Pneumonia     Sacral ulcer ([facility])     Urethral stricture     UTI due to Klebsiella species      Past Surgical History:   Procedure Laterality Date    EYE SURGERY      KIDNEY STONE SURGERY          Restrictions        Safety Devices: Safety Devices  Safety Devices in place: Yes  Type of devices:  All fall risk precautions in place        Subjective  Pre Treatment Pain Screening  Pain at present: 0  Scale Used: Numeric Score  Intervention List: Patient able to continue with treatment    Pain Reassessment:   Pain Assessment  Pain Assessment: 0-10  Pain Level: 0       Prior Level of Function:  Social/Functional History  Lives With: Spouse  Type of Home: Apartment  Home Layout: Two level(12 steps to second floor)  Home Access: (pt unable to answer)  Bathroom Shower/Tub: Tub/Shower unit  [place] Toilet: Standard  Bathroom Equipment: Grab bars in shower, Tub transfer bench  Home Equipment: Rolling walker, Standard walker  Receives Help From: Home health(wife has broken ankle, normally would be helper)  ADL Assistance: Needs assistance(pt receiving home health services, but poor historian and unable to describe assistance levels.)  Bath: Unable to assess(comment)  Dressing: Unable to assess(comment)  Grooming: Unable to assess(comment)  Toileting: Needs assistance  Homemaking Assistance: Needs assistance(home health assist as wife has broken ankle and unable to help)  Homemaking Responsibilities: No  Ambulation Assistance: Needs assistance  Transfer Assistance: Needs assistance  Active : No    OBJECTIVE:     Orientation Status:  Orientation  Overall Orientation Status: Within Functional Limits(person, place, time)    Observation:  Observation/Palpation  Observation: pt supine in bed upon start of evaluation, sleeping. Cognition Status:  Cognition  Overall Cognitive Status: Exceptions  Arousal/Alertness: Appropriate responses to stimuli  Following Commands: Follows one step commands with repetition  Attention Span: Attends with cues to redirect  Memory: Appears intact  Safety Judgement: Decreased awareness of need for safety  Insights: Fully aware of deficits  Initiation: Requires cues for all    Perception Status:  Perception  Overall Perceptual Status: WFL    Sensation Status:  Sensation  Overall Sensation Status:  WNL    Vision and Hearing Status:  Vision  Vision: Impaired  Vision Exceptions: Legally blind(artificial right eye secondary to diabetes complications, glasses for left eye but pt reports losing them and never getting replacements.)  Hearing  Hearing: Exceptions to WFL  Hearing Exceptions: Hard of hearing/hearing concerns     ROM:   LUE AROM (degrees)  LUE AROM : WFL  RUE AROM (degrees)  RUE AROM : WFL    Strength:  LUE Strength  Gross LUE Strength: Exceptions to [facility]  L Hand General: 3+/5  RUE Strength  R Hand General: 3+/5    Coordination, Tone, Quality of Movement: Tone RUE  RUE Tone: Normotonic  Tone LUE  LUE Tone: Normotonic    Hand Dominance:  Hand Dominance  Hand Dominance: Right    ADL Status:  ADL  Feeding: Setup  Grooming: Minimal assistance  UE Bathing: Moderate assistance  LE Bathing: Maximum assistance  UE Dressing: Moderate assistance  LE Dressing: Maximum assistance  Toileting: Dependent/Total  Additional Comments: requires total assist for hygiene, simulated EOB ADLs          Therapy key for assistance levels -   Independent = Pt. is able to perform task with no assistance but may require a device   Stand by assistance = Pt. does not perform task at an independent level but does not need physical assistance, requires verbal cues  Minimal, Moderate, Maximal Assistance = Pt. requires physical assistance (25%, 50%, 75% assist from helper) for task but is able to actively participate in task   Dependent = Pt. requires total assistance with task and is not able to actively participate with task completion     Functional Mobility:  Functional Mobility  Functional - Mobility Device: Wheelchair  Functional Mobility Comments: pt unsafe  Transfers  Sit to stand: Moderate assistance  Stand to sit: Minimal assistance  Transfer Comments: unsafe and quick transfers. Education on safe use of w/w not understood by pt    Bed Mobility  Bed mobility  Supine to Sit: Moderate assistance  Sit to Supine: Maximum assistance    Seated and Standing Balance:  Balance  Sitting Balance: Contact guard assistance  Standing Balance:  Moderate assistance    Functional Endurance:  Activity Tolerance  Activity Tolerance: Treatment limited secondary to decreased cognition    D/C Recommendations:  OT D/C RECOMMENDATIONS  REQUIRES OT FOLLOW UP: Yes    Equipment Recommendations:       OT Education:   OT Education  OT Education: OT Role, Plan of Care, Orientation, ADL Adaptive Strategies, Transfer Training, Low Vision Education    OT Follow Up:  OT D/C RECOMMENDATIONS  REQUIRES OT FOLLOW UP: Yes       Assessment/Discharge Disposition:  Assessment: Pt is 81 y/o male with multiple deficits listed above. Pt lives with wife who is unable to care for him secondary to her own physical deficits. Pt requiring max verbal cues with one step directions and is unsafe and unable to follow along.  Pt would benefit from continued Occupational Therapy for education on safe functional transfers, safe functional ambulation and increased assist with self care tasks to prepare for d/c.  Performance deficits / Impairments: Decreased functional mobility , Decreased safe awareness, Decreased balance, Decreased coordination, Decreased ADL status, Decreased cognition, Decreased posture, Decreased endurance, Decreased high-level IADLs, Decreased strength, Decreased fine motor control, Decreased vision/visual deficit  Discharge Recommendations: Continue to assess pending progress  Decision Making: High Complexity  History: high  Exam: high  Assistance / Modification: high    Six Click Score   How much help for putting on and taking off regular lower body clothing?: Total  How much help for Bathing?: Total  How much help for Toileting?: Total  How much help for putting on and taking off regular upper body clothing?: Total  How much help for taking care of personal grooming?: Total  How much help for eating meals?: A Little  AM-[facility] Inpatient Daily Activity Raw Score: 8  AM-[facility] Inpatient ADL T-Scale Score : 22.86  ADL Inpatient CMS 0-100% Score: 85.69    Plan:  Plan  Times per week: 1-3  Plan weeks: length of acute stay  Current Treatment Recommendations: Safety Education & Training, Strengthening, Balance Training, Patient/Caregiver Education & Training, Self-Care / ADL, Cognitive/Perceptual Training, Functional Mobility Training, Endurance Training, Cognitive Reorientation, Neuromuscular Re-education  Plan Comment: continue POC    Goals:   Patient will:    - Improve functional endurance to tolerate/complete 10 mins of ADL's  - Be Mod A in UB ADLs   - Be Mod A in LB ADLs  - Be min A in ADL transfers without LOB  - Be Mod A in toileting tasks  - Improve B UE Function (AROM, strength, motor control, tone normalization) to complete ADLs as projected. - Improve 4/5 UE strength and endurance to good in order to participate in self-care activities as projected. - Access appropriate D/C site with as few architectural barriers as possible.     Patient Goal: Patient goals : wants to go home to his cat     Discussed and agreed upon: Yes Comments:     Therapy Time:   OT Individual Minutes  Time In: 1107  Time Out: [facility]  Minutes: 28    Electronically signed by:    JODI Hinojosa  12/20/2020, 11:45 AM Occupational Therapy

## 2020-12-20 NOTE — CARE COORDINATION
PT CALLED THIS CM TO BEDSIDE. HE STATES THAT HE NOW DOES NOT WANT ANYONE TO HAVE ANY INFORMATION ON HIM EXCEPT ADAM RUIZ. HE STATES \"VELVET ASTUDILLO IS NOT GOOD AND MESSING WITH FIRE\". HE THEN ASKED ME TO CALL HIS WIFE ADAM. WHEN TALKING TO ADAM HE STATES THAT \"VELVET WAS TRYING TO PUT ME ON HOSPICE AND I KNOW SHE HAS A MOUTH FULL\". PT STATES I JUST WANT TO COME HOME AND ASKS \"HOW IS YOUR LEG? \". HE ATTEMPTED TO VERIFY HIS HOME PHONE NUMBER WITH HIS WIFE, BUT HAD THE NUMBERS MIXED UP. THIS CM LEFT THE ROOM AT THIS POINT.

## 2020-12-20 NOTE — PROGRESS NOTES
Physician Progress Note    12/20/2020   2:32 PM    Name:  Marco A Petersen  MRN:    [id number]     IP Day: 2     Admit Date: 12/18/2020  9:52 AM  PCP: Gia Faulkner DO    Code Status:  DNR-CCA    Subjective:     No complaints.      Current Facility-Administered Medications   Medication Dose Route Frequency Provider Last Rate Last Admin    sodium bicarbonate tablet 1,300 mg  1,300 mg Oral BID Belia Crosser, DO        iron sucrose (VENOFER) 200 mg in sodium chloride 0.9 % 100 mL IVPB  200 mg Intravenous Daily Rich Caldwell MD        [START ON 12/28/2020] darbepoetin dahlia-polysorbate (ARANESP) injection 25 mcg  25 mcg Subcutaneous Weekly - Monday Kalie Knox MD        aspirin chewable tablet 81 mg  81 mg Oral Daily Belia Crosser, DO   81 mg at 12/20/20 0825    insulin glargine (LANTUS) injection vial 10 Units  10 Units Subcutaneous Nightly Belia Crosser, DO   10 Units at 12/19/20 2145    vitamin B-12 (CYANOCOBALAMIN) tablet 500 mcg  500 mcg Oral Daily Belia Crosser, DO   500 mcg at 12/20/20 0825    0.9 % sodium chloride infusion   Intravenous Continuous Rich Caldwell MD 50 mL/hr at 12/20/20 1406 New Bag at 12/20/20 1406    sodium chloride flush 0.9 % injection 10 mL  10 mL Intravenous 2 times per day Belia Crosser, DO   10 mL at 12/19/20 0825    sodium chloride flush 0.9 % injection 10 mL  10 mL Intravenous PRN Belia Crosser, DO   10 mL at 12/18/20 1611    enoxaparin (LOVENOX) injection 30 mg  30 mg Subcutaneous Daily Belia Crosser, DO   30 mg at 12/20/20 0825    acetaminophen (TYLENOL) tablet 650 mg  650 mg Oral Q6H PRN Belia Crosser, DO        Or    acetaminophen (TYLENOL) suppository 650 mg  650 mg Rectal Q6H PRN Belia Crosser, DO        cefTRIAXone (ROCEPHIN) 1 g IVPB in 50 mL D5W minibag  1 g Intravenous Q24H Belia Crosser, DO   Stopped at 12/20/20 0903    glucose (GLUTOSE) 40 % oral gel 15 g  15 g Oral PRN Belia Crosser, DO        dextrose 50 % IV solution  12.5 g Intravenous PRN Jeremiah Kothari, DO        glucagon (rDNA) injection 1 mg  1 mg Intramuscular PRN Jeremiah Kothari, DO        dextrose 5 % solution  100 mL/hr Intravenous PRN Jeremiah Kothari, DO        insulin lispro (HUMALOG) injection vial 0-6 Units  0-6 Units Subcutaneous TID WC Jeremiah Kothari, DO   3 Units at 20 1154    insulin lispro (HUMALOG) injection vial 0-3 Units  0-3 Units Subcutaneous Nightly Jeremiah Kothari, DO   1 Units at 20 2145    vancomycin (VANCOCIN) intermittent dosing (placeholder)   Other RX Placeholder Jeremiah Kothari DO        vancomycin 1000 mg IVPB in 250 mL D5W addavial  1,000 mg Intravenous Q36H Jeremiah Kothari DO   Stopped at 20 2680       Physical Examination:      Vitals:  BP (!) 139/55   Pulse 99   Temp 97.7 °F (36.5 °C) (Oral)   Resp 20   Ht 5' 10\" (1.778 m)   Wt 150 lb (68 kg)   SpO2 99%   BMI 21.52 kg/m²   Temp (24hrs), Av.7 °F (36.5 °C), Min:97.7 °F (36.5 °C), Max:97.7 °F (36.5 °C)      General appearance: Elderly. Very hard of hearing but answers questions appropriately. Follows commands. Oriented to person, place, and time  Skin: Skin color, texture, turgor normal. No rashes or lesions  HEENT: eomi, perrla. MMM  Neck: No JVD or lymphadenopathy  Lungs: CTA bilaterally. No wheeze   Heart: RRR, no murmur or gallp  Abdomen: soft, nontender. Bsx4. No masses or organomegaly  Extremities: no edema, redness, or tenderness in calves.   Neurologic: No focal deficits     Data:     Labs:  Recent Labs     20  0720  0722   WBC 15.8* 17.2*   HGB 7.4* 7.3*   * 437*     Recent Labs     20  0727 20  0722   * 129*   K 4.6 4.3  4.3    104   CO2 13* 11*   BUN 58* 49*   CREATININE 2.67* 2.59*   GLUCOSE 202* 194*     Recent Labs     20  1015   AST 15   ALT 18   BILITOT 0.3   ALKPHOS 115*       Assessment and Plan:        78-year-old male with history of type 2 diabetes, CKD 4, chronic hydronephrosis, MRSA and klebsiella UTI (2019) presents from home with 1 day history of weakness-his legs gave out while he was getting up from the toilet.     1. Weakness suspect related to sepsis from UTI (wbc 19, HR>90 on admission)  -Follow UA, urine culture, blood cultures- no growth thus far  -Empiric ceftriaxone, vancomycin (h/o MRSA in urine). S/p 1 dose fluoroquinolone in ED  -ID following  -IVF support as needed  -PT/OT     Sacral wound POA: wound care nurse to follow  Anemia suspect of chronic disease: Check iron studies  Type 2 diabetes: SSI  CKD 4-add bicarbonate tablet  Euthyroid sick syndrome  Elevated troponin suspect supply/demand mismatch: Trend  Chronic hydronephrosis  Hypertension  Age-related hearing loss     DNR CCA-confirmed with patient on 12/18    I discussed this case with Dr. Alisha Delvalle on 12/18-he will take over as the attending when he returns to the hospital.  He is not working this weekend.     Electronically signed by Braydon Hurtado DO on 12/20/2020 at 2:32 PM

## 2020-12-20 NOTE — CARE COORDINATION
Christiano phoned from [facility]. He states that he has been following this patient for quite some time, but he has not been officially on hospice. He states they also had home care with their company. Nancy Sheehan RN aware. Continuing to follow for best d/c plan for patient.

## 2020-12-20 NOTE — CARE COORDINATION
[employer] phoned and daughter Luciano Miranda. Discussed that I can only release information to [employer]. Lilly Santiago discussed that she thinks he should still be on hospice and she was going to contact the [place] tomorrow to check on it. She states his insurance will not cover a SNF. She states he just wants to come home.

## 2020-12-21 LAB
ANION GAP SERPL CALCULATED.3IONS-SCNC: 11 MEQ/L (ref 9–15)
BASOPHILS ABSOLUTE: 0.2 K/UL (ref 0–0.2)
BASOPHILS RELATIVE PERCENT: 0.8 %
BUN BLDV-MCNC: 49 MG/DL (ref 8–23)
CALCIUM SERPL-MCNC: 8.3 MG/DL (ref 8.5–9.9)
CHLORIDE BLD-SCNC: 106 MEQ/L (ref 95–107)
CO2: 14 MEQ/L (ref 20–31)
CREAT SERPL-MCNC: 3.1 MG/DL (ref 0.7–1.2)
EOSINOPHILS ABSOLUTE: 0.1 K/UL (ref 0–0.7)
EOSINOPHILS RELATIVE PERCENT: 0.5 %
GFR AFRICAN AMERICAN: 23.1
GFR NON-AFRICAN AMERICAN: 19.1
GLUCOSE BLD-MCNC: 190 MG/DL (ref 70–99)
GLUCOSE BLD-MCNC: 200 MG/DL (ref 60–115)
GLUCOSE BLD-MCNC: 202 MG/DL (ref 60–115)
GLUCOSE BLD-MCNC: 235 MG/DL (ref 60–115)
GLUCOSE BLD-MCNC: 310 MG/DL (ref 60–115)
HCT VFR BLD CALC: 25 % (ref 42–52)
HEMOGLOBIN: 7.9 G/DL (ref 14–18)
LYMPHOCYTES ABSOLUTE: 3.3 K/UL (ref 1–4.8)
LYMPHOCYTES RELATIVE PERCENT: 15.6 %
MCH RBC QN AUTO: 28 PG (ref 27–31.3)
MCHC RBC AUTO-ENTMCNC: 31.7 % (ref 33–37)
MCV RBC AUTO: 88.5 FL (ref 80–100)
MONOCYTES ABSOLUTE: 1.5 K/UL (ref 0.2–0.8)
MONOCYTES RELATIVE PERCENT: 7.3 %
NEUTROPHILS ABSOLUTE: 16.1 K/UL (ref 1.4–6.5)
NEUTROPHILS RELATIVE PERCENT: 75.8 %
PDW BLD-RTO: 15.4 % (ref 11.5–14.5)
PERFORMED ON: ABNORMAL
PLATELET # BLD: 503 K/UL (ref 130–400)
POTASSIUM REFLEX MAGNESIUM: 4.4 MEQ/L (ref 3.4–4.9)
POTASSIUM SERPL-SCNC: 4.4 MEQ/L (ref 3.4–4.9)
RBC # BLD: 2.82 M/UL (ref 4.7–6.1)
SODIUM BLD-SCNC: 131 MEQ/L (ref 135–144)
WBC # BLD: 21.2 K/UL (ref 4.8–10.8)

## 2020-12-21 PROCEDURE — 6370000000 HC RX 637 (ALT 250 FOR IP): Performed by: INTERNAL MEDICINE

## 2020-12-21 PROCEDURE — 6360000002 HC RX W HCPCS: Performed by: INTERNAL MEDICINE

## 2020-12-21 PROCEDURE — 99232 SBSQ HOSP IP/OBS MODERATE 35: CPT | Performed by: INTERNAL MEDICINE

## 2020-12-21 PROCEDURE — 2580000003 HC RX 258: Performed by: INTERNAL MEDICINE

## 2020-12-21 PROCEDURE — P9047 ALBUMIN (HUMAN), 25%, 50ML: HCPCS | Performed by: INTERNAL MEDICINE

## 2020-12-21 PROCEDURE — 99213 OFFICE O/P EST LOW 20 MIN: CPT

## 2020-12-21 PROCEDURE — 1210000000 HC MED SURG R&B

## 2020-12-21 PROCEDURE — 97535 SELF CARE MNGMENT TRAINING: CPT

## 2020-12-21 PROCEDURE — 85025 COMPLETE CBC W/AUTO DIFF WBC: CPT

## 2020-12-21 PROCEDURE — 36415 COLL VENOUS BLD VENIPUNCTURE: CPT

## 2020-12-21 PROCEDURE — 80048 BASIC METABOLIC PNL TOTAL CA: CPT

## 2020-12-21 RX ORDER — ALBUMIN (HUMAN) 12.5 G/50ML
25 SOLUTION INTRAVENOUS ONCE
Status: COMPLETED | OUTPATIENT
Start: 2020-12-21 | End: 2020-12-21

## 2020-12-21 RX ORDER — LEVOFLOXACIN 250 MG/1
250 TABLET ORAL DAILY
Status: DISCONTINUED | OUTPATIENT
Start: 2020-12-21 | End: 2020-12-23 | Stop reason: HOSPADM

## 2020-12-21 RX ADMIN — LEVOFLOXACIN 250 MG: 250 TABLET, FILM COATED ORAL at 12:23

## 2020-12-21 RX ADMIN — SODIUM BICARBONATE 1300 MG: 650 TABLET ORAL at 21:13

## 2020-12-21 RX ADMIN — INSULIN GLARGINE 10 UNITS: 100 INJECTION, SOLUTION SUBCUTANEOUS at 21:13

## 2020-12-21 RX ADMIN — SODIUM CHLORIDE: 9 INJECTION, SOLUTION INTRAVENOUS at 11:20

## 2020-12-21 RX ADMIN — CEFTRIAXONE SODIUM 1 G: 1 INJECTION, POWDER, FOR SOLUTION INTRAMUSCULAR; INTRAVENOUS at 08:45

## 2020-12-21 RX ADMIN — ENOXAPARIN SODIUM 30 MG: 30 INJECTION SUBCUTANEOUS at 08:45

## 2020-12-21 RX ADMIN — Medication 10 ML: at 08:46

## 2020-12-21 RX ADMIN — ALBUMIN (HUMAN) 25 G: 0.25 INJECTION, SOLUTION INTRAVENOUS at 14:35

## 2020-12-21 RX ADMIN — Medication 10 ML: at 21:45

## 2020-12-21 RX ADMIN — SODIUM BICARBONATE 1300 MG: 650 TABLET ORAL at 08:45

## 2020-12-21 RX ADMIN — ASPIRIN 81 MG: 81 TABLET, CHEWABLE ORAL at 08:45

## 2020-12-21 RX ADMIN — CYANOCOBALAMIN TAB 500 MCG 500 MCG: 500 TAB at 08:45

## 2020-12-21 RX ADMIN — IRON SUCROSE 200 MG: 20 INJECTION, SOLUTION INTRAVENOUS at 10:12

## 2020-12-21 ASSESSMENT — ENCOUNTER SYMPTOMS
RESPIRATORY NEGATIVE: 1
GASTROINTESTINAL NEGATIVE: 1

## 2020-12-21 ASSESSMENT — PAIN SCALES - GENERAL
PAINLEVEL_OUTOF10: 0

## 2020-12-21 NOTE — PROGRESS NOTES
Wound Ostomy Continence Nurse  Consult Note       NAME:  Aram Nj  MEDICAL RECORD NUMBER:  [id number]  AGE: 80 y.o. GENDER: male  : 1932  TODAY'S DATE:  2020    Subjective   Reason for [id number] Nurse Evaluation and Assessment: Stage 2 bilateral buttocks      Aram Nj is a 80 y.o. male referred by:   [x] Physician  [] Nursing  [] Other:     Wound Identification:  Wound Type: pressure  Contributing Factors: diabetes, chronic pressure, decreased mobility, shear force, malnutrition, incontinence of stool and incontinence of urine    Wound History: Patient admitted to 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  Current Wound Care Treatment:  Recommending 1) Pressure injury prevention interventions 2) Incontinence care 3) protective barrier with zinc to bilateral buttocks    Patient Goal of Care:  [x] Wound Healing  [] Odor Control  [] Palliative Care  [] Pain Control   [] Other:         PAST MEDICAL HISTORY        Diagnosis Date    Acute metabolic encephalopathy     Anemia in CKD (chronic kidney disease)     Blind right eye     Chronic hyponatremia     CKD (chronic kidney disease) stage 4, GFR 15-29 ml/min ([facility])     Diabetes mellitus ([address]     Hypertension     Kidney stone     Klebsiella pneumoniae sepsis ([address]     Mixed hyperlipidemia     Obstructive uropathy     Paroxysmal A-fib ([address]     Pneumonia     Sacral ulcer ([address]     Urethral stricture     UTI due to Klebsiella species        PAST SURGICAL HISTORY    Past Surgical History:   Procedure Laterality Date    EYE SURGERY      KIDNEY STONE SURGERY         FAMILY HISTORY    History reviewed. No pertinent family history.     SOCIAL HISTORY    Social History     Tobacco Use    Smoking status: Former Smoker    Smokeless tobacco: Never Used   Substance Use Topics    Alcohol use: Not Currently    Drug use: Never ALLERGIES    Allergies   Allergen Reactions    Pcn [Penicillins]     Hctz [Hydrochlorothiazide] Rash       MEDICATIONS    No current facility-administered medications on file prior to encounter.       Current Outpatient Medications on File Prior to Encounter   Medication Sig Dispense Refill    aspirin 81 MG tablet Take 81 mg by mouth daily      vitamin B-12 (CYANOCOBALAMIN) 500 MCG tablet Take 500 mcg by mouth daily      furosemide (LASIX) 20 MG tablet Take 40 mg by mouth daily       insulin lispro protamine & lispro (HUMALOG MIX 75/25 KWIKPEN) (75-25) 100 UNIT per ML SUPN injection pen Inject 0.15 mLs into the skin 2 times daily (with meals) 5 pen 3    insulin glargine (LANTUS SOLOSTAR) 100 UNIT/ML injection pen Inject 10 Units into the skin nightly 5 pen 3    insulin lispro (HUMALOG KWIKPEN) 100 UNIT/ML pen Glucose 100-120 no insulin, 121-140 2 units, 141-160 4 units, 161- 180 6 units, 181-200 8 units, 200 or higher 10 units 5 pen 3    Insulin Pen Needle (PEN NEEDLES) 31G X 6 MM MISC 1 each by Does not apply route 4 times daily (before meals and nightly) 150 each 3       Objective    BP (!) 121/46   Pulse 98   Temp 97.2 °F (36.2 °C) (Oral)   Resp 17   Ht 5' 10\" (1.778 m)   Wt 150 lb (68 kg)   SpO2 98%   BMI 21.52 kg/m²     LABS:  WBC:    Lab Results   Component Value Date    WBC 21.2 12/21/2020     H/H:    Lab Results   Component Value Date    HGB 7.9 12/21/2020    HCT 25.0 12/21/2020     PTT:  No results found for: APTT, PTT[APTT}  PT/INR:  No results found for: PROTIME, INR  HgBA1c:    Lab Results   Component Value Date    LABA1C 9.2 12/19/2020       Assessment   Jimmy Risk Score: Jimmy Scale Score: 19    Patient Active Problem List   Diagnosis    Pneumonia    Uncontrolled type 2 diabetes mellitus with hyperglycemia (HCC)    Gram negative sepsis (HCC)    Klebsiella pneumoniae sepsis (HCC)    Bradycardia    Fall at home    Hyponatremia    Hyperkalemia    Paroxysmal A-fib ([address]    Hypertension    Anemia in CKD (chronic kidney disease)    Hypotension    Ataxic gait    Dementia due to [facility]'s disease [facility]    Chronic kidney disease, stage IV (severe) (HCC)    Anemia of chronic renal failure    Weakness       Measurements:  Wound 07/24/19 Buttocks Right (Active)   Number of days: 516       Wound 10/04/19 Buttocks Left Incontinence Associated derm (IAD) (Active)   Number of days: 444       Wound 10/04/19 Buttocks Right (Active)   Number of days: 444       Wound 12/18/20 Buttocks Left stage 2 (Active)   Wound Etiology Pressure Stage  2 12/21/20 1242   Dressing Status New dressing applied 12/21/20 1242   Wound Cleansed Soap and water 12/21/20 1242   Dressing/Treatment Zinc paste 12/21/20 1242   Dressing Change Due 12/21/20 12/21/20 1242   Wound Length (cm) 2 cm 12/21/20 1242   Wound Width (cm) 2 cm 12/21/20 1242   Wound Depth (cm) 0 cm 12/21/20 1242   Wound Surface Area (cm^2) 4 cm^2 12/21/20 1242   Wound Volume (cm^3) 0 cm^3 12/21/20 1242   Wound Assessment Pink/red 12/21/20 1242   Drainage Amount None 12/21/20 1242   Odor None 12/21/20 1242   Leslye-wound Assessment Maceration 12/21/20 1242   Margins Defined edges 12/21/20 1242   Wound Thickness Description not for Pressure Injury Partial thickness 12/21/20 1242   Number of days: 2       Wound 12/18/20 Buttocks Right stage 2 (Active)   Wound Etiology Pressure Stage  2 12/21/20 1242   Dressing Status New dressing applied 12/21/20 1242   Wound Cleansed Soap and water 12/21/20 1242   Dressing/Treatment Zinc paste 12/21/20 1242   Dressing Change Due 12/21/20 12/21/20 1242   Wound Length (cm) 2 cm 12/21/20 1242   Wound Width (cm) 2.5 cm 12/21/20 1242   Wound Depth (cm) 0 cm 12/21/20 1242   Wound Surface Area (cm^2) 5 cm^2 12/21/20 1242   Wound Volume (cm^3) 0 cm^3 12/21/20 1242   Wound Assessment Pink/red 12/21/20 1242   Drainage Amount Scant 12/21/20 1242   Drainage Description Serous 12/21/20 1242   Odor None 12/21/20 1242   Leslye-wound Assessment Maceration 12/21/20 1242   Wound Thickness Description not for Pressure Injury Partial thickness 12/21/20 1242   Number of days: 2        Bilateral buttocks with clean, pink, superficial wound bed. Left buttocks with out drainage, and right buttock with small amount of serous drainage. Leslye wound skin with IAD and light purple hue, which is blanchable     Plan   Plan of Care: Wound 12/18/20 Buttocks Left stage 2-Dressing/Treatment: Zinc paste  Wound 12/18/20 Buttocks Right stage 2-Dressing/Treatment: Zinc paste    Specialty Bed Required : N/A   [] Low Air Loss   [] Pressure Redistribution  [] Fluid Immersion  [] Bariatric  [] Other:     Current Diet: DIET CARB CONTROL;   Dietary Nutrition Supplements: Diabetic Oral Supplement  Dietician consult:  N/A    Discharge Plan:  Placement for patient upon discharge: skilled nursing    Patient appropriate for Outpatient [address]: N/A    Referrals:  []   [] [facility]  [] Supplies  [] Other    Patient/Caregiver Teaching:  Level of patient/caregiver understanding able to:   [] Indicates understanding       [] Needs reinforcement  [] Unsuccessful      [] Verbal Understanding  [] Demonstrated understanding       [] No evidence of learning  [] Refused teaching         [x] N/A       Electronically signed by PATRICIO Romano, RN, Pedro Pablo Jones on 12/21/2020 at 12:51 PM

## 2020-12-21 NOTE — PROGRESS NOTES
Pt turned and repositioned q 2 hours. Inc of urine changed frequently. Fall precautions in place. meds given per orders. medpelex in buttocks and sacral area CDI. Hourly rounds continue. Call light in reach.

## 2020-12-21 NOTE — CARE COORDINATION
Attempted phone call to wife to discuss DC plan. Message left. Phone call received from [place] with [facility] [phone]. He stated they have evaluated pt for hospice in the past due to a referral from [address] [place] [address] with whom pt is active. Pt was not previously hospice eligible, but he stated wife is requesting he be evaluated again. He said they could do this with an order. Spoke with wife on the phone. She said she would like a SNF first and Hospice 2nd if SNF not indicated. She would like [employer] first and [facility]. Referrals made.

## 2020-12-21 NOTE — PROGRESS NOTES
Infectious Disease     Patient Name: Aram Nj  Date: 12/21/2020  YOB: 1932  Medical Record Number: [id number]      UTI      Patient presents with 1 day history of leg weakness difficulty getting up from toilet  Recent foot injury  No fevers chills sweats nausea vomiting    Increased urination    Urinalysis shows significant inflammation  cells per high-power field  White blood cell count is elevated 15,000  Urine from 10/2019 grew MRSA      COVID-19 testing negative      Chest x-ray shows no infiltrate      Review of Systems   Constitutional: Negative for chills and fatigue. Respiratory: Negative. Cardiovascular: Negative. Gastrointestinal: Negative. Physical Exam   Cardiovascular: Normal heart sounds. No murmur heard. Pulmonary/Chest: Effort normal and breath sounds normal. No respiratory distress. He has no wheezes. He has no rales. Abdominal: Soft. He exhibits no distension. There is no abdominal tenderness. There is no rebound and no guarding. Blood pressure (!) 121/46, pulse 98, temperature 97.2 °F (36.2 °C), temperature source Oral, resp. rate 17, height 5' 10\" (1.778 m), weight 150 lb (68 kg), SpO2 98 %.       .   Lab Results   Component Value Date    WBC 21.2 (H) 12/21/2020    HGB 7.9 (L) 12/21/2020    HCT 25.0 (L) 12/21/2020    MCV 88.5 12/21/2020     (H) 12/21/2020     Lab Results   Component Value Date     12/21/2020    K 4.4 12/21/2020    K 4.4 12/21/2020     12/21/2020    CO2 14 12/21/2020    BUN 49 12/21/2020    CREATININE 3.10 12/21/2020    GLUCOSE 190 12/21/2020    CALCIUM 8.3 12/21/2020          Microscopic Urinalysis [[id number]] (Abnormal) Collected: 12/19/20 1011       Updated: 12/19/20 1204      Mucus, UA Present /LPF       WBC, UA  /HPF       RBC, UA 5-10 /HPF       Epithelial Cells, UA 0-2 /HPF       Bacteria, UA MODERATE /HPF       Amorphous, UA 2+     Hemoglobin A1c [[id number]] (Abnormal) Collected: 12/19/20 7169     Specimen: Blood Updated: 12/19/20 1203      Hemoglobin A1C 9.2 %      Urinalysis [[id number]] (Abnormal) Collected: 12/19/20 1011     Specimen: Urine, clean catch Updated: 12/19/20 1202      Color, UA JUDY      Clarity, UA TURBID      Glucose, Ur Negative mg/dL       Bilirubin Urine Negative      Ketones, Urine Negative mg/dL       Specific Gravity, UA 1.010      Blood, Urine LARGE      pH, UA 8.0      Protein,  mg/dL       Urobilinogen, Urine 0.2 E.U./dL       Nitrite, Urine Negative      Leukocyte Esterase, Urine MODERATE       Blood and urine cultures are negative      PLAN:    UTI    Switch to oral Levaquin

## 2020-12-21 NOTE — PROGRESS NOTES
Nephrology Progress Note  *ok to go to NH  Assessment:  CKD-3  DM type-2  Cachexia  Dementia  UTI/BPH      Plan: will begab PT/OT  Start supplements  One dose albumin IV    Patient Active Problem List:     Pneumonia     Uncontrolled type 2 diabetes mellitus with hyperglycemia (HCC)     Gram negative sepsis (HCC)     Klebsiella pneumoniae sepsis (HCC)     Bradycardia     Fall at home     Hyponatremia     Hyperkalemia     Paroxysmal A-fib ([facility]     Hypertension     Anemia in CKD (chronic kidney disease)     Hypotension     Ataxic gait     Dementia due to Alzheimer's disease ([facility]     Chronic kidney disease, stage IV (severe) (HCC)     Anemia of chronic renal failure     Weakness      Subjective:  Admit Date: 12/18/2020    Interval History: appetite fine for breakfast  Needs increase activity  Placement in NH being done    Medications:  Scheduled Meds:   levoFLOXacin  250 mg Oral Daily    albumin human  25 g Intravenous Once    sodium bicarbonate  1,300 mg Oral BID    iron sucrose  200 mg Intravenous Daily    [START ON 12/28/2020] darbepoetin dahlia-polysorbate  25 mcg Subcutaneous Weekly - Monday    aspirin  81 mg Oral Daily    insulin glargine  10 Units Subcutaneous Nightly    vitamin B-12  500 mcg Oral Daily    sodium chloride flush  10 mL Intravenous 2 times per day    enoxaparin  30 mg Subcutaneous Daily    insulin lispro  0-6 Units Subcutaneous TID WC    insulin lispro  0-3 Units Subcutaneous Nightly     Continuous Infusions:   sodium chloride 50 mL/hr at 12/21/20 1120    dextrose         CBC:   Recent Labs     12/20/20  0722 12/21/20  0606   WBC 17.2* 21.2*   HGB 7.3* 7.9*   * 503*     CMP:    Recent Labs     12/19/20  0727 12/20/20  0722 12/21/20  0606   * 129* 131*   K 4.6 4.3  4.3 4.4  4.4    104 106   CO2 13* 11* 14*   BUN 58* 49* 49*   CREATININE 2.67* 2.59* 3.10*   GLUCOSE 202* 194* 190*   CALCIUM 8.5 8.0* 8.3*   LABGLOM 22.7* 23.5* 19.1*     Troponin:   Recent Labs 12/18/20  1904   TROPONINI 0.021*     BNP: No results for input(s): BNP in the last 72 hours. INR: No results for input(s): INR in the last 72 hours. Lipids: No results for input(s): CHOL, LDLDIRECT, TRIG, HDL, AMYLASE, LIPASE in the last 72 hours. Liver:   Recent Labs     12/20/20  0722   LABALBU 1.8*     Iron:    Recent Labs     12/20/20  0722   FERRITIN 221.9     Urinalysis: No results for input(s): UA in the last 72 hours.     Objective:  Vitals: BP (!) 121/46   Pulse 98   Temp 97.2 °F (36.2 °C) (Oral)   Resp 17   Ht 5' 10\" (1.778 m)   Wt 150 lb (68 kg)   SpO2 98%   BMI 21.52 kg/m²    Wt Readings from Last 3 Encounters:   12/18/20 150 lb (68 kg)   03/18/20 175 lb (79.4 kg)   03/04/20 186 lb (84.4 kg)      24HR INTAKE/OUTPUT:      Intake/Output Summary (Last 24 hours) at 12/21/2020 1330  Last data filed at 12/21/2020 1201  Gross per 24 hour   Intake 2520 ml   Output --   Net 2520 ml       General: alert, in no apparent distress  HEENT: normocephalic, atraumatic, anicteric  Neck: supple, no mass  Lungs: non-labored respirations, clear to auscultation bilaterally  Heart: regular rate and rhythm, no murmurs or rubs  Abdomen: soft, non-tender, non-distended  Ext: no cyanosis, no peripheral edema  Neuro: alert and oriented, no gross abnormalities  Psych: normal mood and affect  Skin: no rash      Electronically signed by Anu Francois MD

## 2020-12-21 NOTE — PROGRESS NOTES
Physical Therapy Med Surg Daily Treatment Note  Facility/Department: [facility]  Room: [place] [address]       NAME: Douglas Tirado  : 1932 (80 y.o.)  MRN: [id number]  CODE STATUS: [id number]    Date of Service: 2020    Patient Diagnosis(es): Weakness [R53.1]   Chief Complaint   Patient presents with    Fatigue     Patient Active Problem List    Diagnosis Date Noted    Weakness 2020    Chronic kidney disease, stage IV (severe) ([address] 2020    Anemia of chronic renal failure 2020    Ataxic gait     Dementia due to Alzheimer's disease ([address]     Bradycardia 10/03/2019    Fall at home 10/03/2019    Hyponatremia 10/03/2019    Hyperkalemia 10/03/2019    Hypotension 10/03/2019    Paroxysmal A-fib ([address]     Hypertension     Anemia in CKD (chronic kidney disease)     Gram negative sepsis (HCC)     Klebsiella pneumoniae sepsis (HCC)     Uncontrolled type 2 diabetes mellitus with hyperglycemia ([address]     Pneumonia 2019        Past Medical History:   Diagnosis Date    Acute metabolic encephalopathy     Anemia in CKD (chronic kidney disease)     Blind right eye     Chronic hyponatremia     CKD (chronic kidney disease) stage 4, GFR 15-29 ml/min (HCC)     Diabetes mellitus ([address]     Hypertension     Kidney stone     Klebsiella pneumoniae sepsis (HCC)     Mixed hyperlipidemia     Obstructive uropathy     Paroxysmal A-fib (HCC)     Pneumonia     Sacral ulcer ([address]     Urethral stricture     UTI due to Klebsiella species      Past Surgical History:   Procedure Laterality Date    EYE SURGERY      KIDNEY STONE SURGERY         Restrictions  Restrictions/Precautions: Fall Risk    SUBJECTIVE   General  Chart Reviewed: Yes  Family / Caregiver Present: No  Subjective  Subjective: \"i'm ok i just miss my wife so much\"  General Comment  Comments: agreeable to tx.     Pre-Session Pain Report  Pre Treatment Pain Screening  Pain at present: 0  Scale Used: Numeric Score  Pain Screening  Patient Currently in Pain: No       Post-Session Pain Report  Pain Assessment  Pain Assessment: 0-10  Pain Level: 0   OBJECTIVE   Orientation  Overall Orientation Status: Impaired  Orientation Level: Oriented to person;Oriented to situation    Bed mobility  Supine to Sit: Maximum assistance  Scooting: Moderate assistance  Comment: pt able to tolerate seated edge of bed once in neutral position and both upper extremities used. Transfers  Comment: pt sat edge of bed with bilateral upper extremity support. leaned to left 2x and then asked to lay down. Did not attempt transfer at this time. ASSESSMENT pt agreeable to sit edge of bed with staff but was fatigued and requested to lay down before attempting transfer. Discharge Recommendations:  Continue to assess pending progress, Patient would benefit from continued therapy after discharge    Goals  Long term goals  Long term goal 1: Pt will demonstrate bed mobility SBA for return to prior level of function. Long term goal 2: Pt will demonstrate transfers SBA with WW or safest AD to allow for safe functional mobility. Long term goal 3: Pt will demonstrate amb 150ft with WW or safest AD SBA to allow for safe amb inside pt's home  Long term goal 4: Pt will demonstrate stair negotiation 1 flight with 1 rail SBA to allow pt to go upstairs to bedroom. Patient Goals   Patient goals : \"go home with my wife\"    PLAN    Times per week: 3-6        [facility] (6 CLICK) [address] Inpatient Mobility Raw Score : 10     Therapy Time   Individual   Time In  1405   Time Out 1420   Minutes 15   15 minutes bed mob/seated activity. Janet Palm PTA, 12/21/20 at 2:25 PM         Definitions for assistance levels  Independent = pt does not require any physical supervision or assistance from another person for activity completion. Device may be needed.   Stand by assistance = pt requires verbal cues or instructions from another person,

## 2020-12-21 NOTE — PLAN OF CARE
Problem: Falls - Risk of:  Goal: Will remain free from falls  Outcome: Ongoing  Goal: Absence of physical injury  Outcome: Ongoing     Problem: IP BALANCE  Goal: LTG - patient will maintain standing balance to allow for completion of daily activities  Outcome: Ongoing     Problem: Skin Integrity:  Goal: Will show no infection signs and symptoms  Outcome: Ongoing  Goal: Absence of new skin breakdown  Outcome: Ongoing

## 2020-12-22 ENCOUNTER — APPOINTMENT (OUTPATIENT)
Dept: ULTRASOUND IMAGING | Age: 85
DRG: 872 | End: 2020-12-22
Payer: MEDICARE

## 2020-12-22 LAB
ABO/RH: NORMAL
ANION GAP SERPL CALCULATED.3IONS-SCNC: 10 MEQ/L (ref 9–15)
ANTIBODY SCREEN: NORMAL
BASOPHILS ABSOLUTE: 0.1 K/UL (ref 0–0.2)
BASOPHILS RELATIVE PERCENT: 0.4 %
BUN BLDV-MCNC: 54 MG/DL (ref 8–23)
CALCIUM SERPL-MCNC: 7.8 MG/DL (ref 8.5–9.9)
CHLORIDE BLD-SCNC: 105 MEQ/L (ref 95–107)
CO2: 14 MEQ/L (ref 20–31)
CREAT SERPL-MCNC: 3.28 MG/DL (ref 0.7–1.2)
EOSINOPHILS ABSOLUTE: 0.1 K/UL (ref 0–0.7)
EOSINOPHILS RELATIVE PERCENT: 0.7 %
GFR AFRICAN AMERICAN: 21.6
GFR NON-AFRICAN AMERICAN: 17.9
GLUCOSE BLD-MCNC: 113 MG/DL (ref 60–115)
GLUCOSE BLD-MCNC: 120 MG/DL (ref 60–115)
GLUCOSE BLD-MCNC: 136 MG/DL (ref 70–99)
GLUCOSE BLD-MCNC: 140 MG/DL (ref 60–115)
GLUCOSE BLD-MCNC: 141 MG/DL (ref 60–115)
HCT VFR BLD CALC: 22.5 % (ref 42–52)
HEMOGLOBIN: 6.8 G/DL (ref 14–18)
LYMPHOCYTES ABSOLUTE: 2.9 K/UL (ref 1–4.8)
LYMPHOCYTES RELATIVE PERCENT: 14.9 %
MCH RBC QN AUTO: 27 PG (ref 27–31.3)
MCHC RBC AUTO-ENTMCNC: 30.4 % (ref 33–37)
MCV RBC AUTO: 88.6 FL (ref 80–100)
MONOCYTES ABSOLUTE: 1.5 K/UL (ref 0.2–0.8)
MONOCYTES RELATIVE PERCENT: 7.7 %
NEUTROPHILS ABSOLUTE: 15.1 K/UL (ref 1.4–6.5)
NEUTROPHILS RELATIVE PERCENT: 76.3 %
PDW BLD-RTO: 15.4 % (ref 11.5–14.5)
PERFORMED ON: ABNORMAL
PERFORMED ON: NORMAL
PLATELET # BLD: 427 K/UL (ref 130–400)
POTASSIUM REFLEX MAGNESIUM: 4.1 MEQ/L (ref 3.4–4.9)
POTASSIUM SERPL-SCNC: 4.1 MEQ/L (ref 3.4–4.9)
RBC # BLD: 2.54 M/UL (ref 4.7–6.1)
SLIDE REVIEW: ABNORMAL
SODIUM BLD-SCNC: 129 MEQ/L (ref 135–144)
WBC # BLD: 19.8 K/UL (ref 4.8–10.8)

## 2020-12-22 PROCEDURE — 80048 BASIC METABOLIC PNL TOTAL CA: CPT

## 2020-12-22 PROCEDURE — 6360000002 HC RX W HCPCS: Performed by: INTERNAL MEDICINE

## 2020-12-22 PROCEDURE — 76775 US EXAM ABDO BACK WALL LIM: CPT

## 2020-12-22 PROCEDURE — 6370000000 HC RX 637 (ALT 250 FOR IP): Performed by: INTERNAL MEDICINE

## 2020-12-22 PROCEDURE — P9016 RBC LEUKOCYTES REDUCED: HCPCS

## 2020-12-22 PROCEDURE — 97535 SELF CARE MNGMENT TRAINING: CPT

## 2020-12-22 PROCEDURE — 99232 SBSQ HOSP IP/OBS MODERATE 35: CPT | Performed by: INTERNAL MEDICINE

## 2020-12-22 PROCEDURE — 86850 RBC ANTIBODY SCREEN: CPT

## 2020-12-22 PROCEDURE — 36415 COLL VENOUS BLD VENIPUNCTURE: CPT

## 2020-12-22 PROCEDURE — 86900 BLOOD TYPING SEROLOGIC ABO: CPT

## 2020-12-22 PROCEDURE — 2580000003 HC RX 258: Performed by: INTERNAL MEDICINE

## 2020-12-22 PROCEDURE — 1210000000 HC MED SURG R&B

## 2020-12-22 PROCEDURE — 85025 COMPLETE CBC W/AUTO DIFF WBC: CPT

## 2020-12-22 PROCEDURE — 86923 COMPATIBILITY TEST ELECTRIC: CPT

## 2020-12-22 PROCEDURE — 86901 BLOOD TYPING SEROLOGIC RH(D): CPT

## 2020-12-22 PROCEDURE — 6370000000 HC RX 637 (ALT 250 FOR IP): Performed by: STUDENT IN AN ORGANIZED HEALTH CARE EDUCATION/TRAINING PROGRAM

## 2020-12-22 PROCEDURE — 36430 TRANSFUSION BLD/BLD COMPNT: CPT

## 2020-12-22 RX ORDER — SODIUM BICARBONATE 650 MG/1
1300 TABLET ORAL 3 TIMES DAILY
Status: DISCONTINUED | OUTPATIENT
Start: 2020-12-22 | End: 2020-12-23 | Stop reason: HOSPADM

## 2020-12-22 RX ORDER — HEPARIN SODIUM 5000 [USP'U]/ML
5000 INJECTION, SOLUTION INTRAVENOUS; SUBCUTANEOUS EVERY 8 HOURS SCHEDULED
Status: DISCONTINUED | OUTPATIENT
Start: 2020-12-23 | End: 2020-12-23 | Stop reason: HOSPADM

## 2020-12-22 RX ORDER — 0.9 % SODIUM CHLORIDE 0.9 %
20 INTRAVENOUS SOLUTION INTRAVENOUS ONCE
Status: COMPLETED | OUTPATIENT
Start: 2020-12-22 | End: 2020-12-22

## 2020-12-22 RX ADMIN — ASPIRIN 81 MG: 81 TABLET, CHEWABLE ORAL at 07:46

## 2020-12-22 RX ADMIN — SODIUM CHLORIDE: 9 INJECTION, SOLUTION INTRAVENOUS at 09:25

## 2020-12-22 RX ADMIN — IRON SUCROSE 200 MG: 20 INJECTION, SOLUTION INTRAVENOUS at 10:30

## 2020-12-22 RX ADMIN — Medication 10 ML: at 07:47

## 2020-12-22 RX ADMIN — SODIUM BICARBONATE 1300 MG: 650 TABLET ORAL at 07:46

## 2020-12-22 RX ADMIN — CYANOCOBALAMIN TAB 500 MCG 500 MCG: 500 TAB at 07:46

## 2020-12-22 RX ADMIN — ENOXAPARIN SODIUM 30 MG: 30 INJECTION SUBCUTANEOUS at 07:47

## 2020-12-22 RX ADMIN — SODIUM BICARBONATE 1300 MG: 650 TABLET ORAL at 16:32

## 2020-12-22 RX ADMIN — SODIUM BICARBONATE 1300 MG: 650 TABLET ORAL at 22:21

## 2020-12-22 RX ADMIN — SODIUM CHLORIDE 20 ML: 9 INJECTION, SOLUTION INTRAVENOUS at 14:12

## 2020-12-22 RX ADMIN — INSULIN GLARGINE 10 UNITS: 100 INJECTION, SOLUTION SUBCUTANEOUS at 22:23

## 2020-12-22 RX ADMIN — Medication 10 ML: at 22:26

## 2020-12-22 RX ADMIN — LEVOFLOXACIN 250 MG: 250 TABLET, FILM COATED ORAL at 07:47

## 2020-12-22 ASSESSMENT — ENCOUNTER SYMPTOMS
RESPIRATORY NEGATIVE: 1
GASTROINTESTINAL NEGATIVE: 1

## 2020-12-22 ASSESSMENT — PAIN SCALES - GENERAL: PAINLEVEL_OUTOF10: 0

## 2020-12-22 NOTE — PROGRESS NOTES
Renal Progress Note    Assessment and Plan:    80years old male presented to the emergency department for further evaluation and management of lower extremity weakness and difficulty in performing his standard regular ADL instrumental ADL clinical and laboratory assessment at the emergency department did show that the patient does have MRSA and Klebsiella with UTI back in 2019 he does have obstructive uropathy with hydronephrosis and admitted with possible UTI as underlying etiology of the patient presenting complaint also the observed profound anemia could be a major contribution to the patient clinical presentation and symptoms   1. Patient is a chronic kidney disease stage IV the etiology of which is possibly diabetic nephropathy   2. Anemia  iron deficiency and anemia of chronic kidney disease that may require ZACK   3.   Secondary hyperparathyroidism and hyperphosphatemia required to be checked   4-hemodynamically stable afebrile nonoliguric nonpolyuric with no clinical evidence of increased extracellular fluid volume     Plan  - stopping fluids as renal function worsening and pt able to tolerate PO (has h/o HFpEF)  - obtaining urine studies  - checking renal U/S (has h/o hydronephrosis)   - will give 2 more days of IV iron   - increase PO bicarb to TID       Patient Active Problem List:     Pneumonia     Uncontrolled type 2 diabetes mellitus with hyperglycemia (HCC)     Gram negative sepsis (HCC)     Klebsiella pneumoniae sepsis (HCC)     Bradycardia     Fall at home     Hyponatremia     Hyperkalemia     Paroxysmal A-fib (HCC)     Hypertension     Anemia in CKD (chronic kidney disease)     Hypotension     Ataxic gait     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)     Chronic kidney disease, stage IV (severe) ([facility]     Anemia of chronic renal failure     Weakness      Subjective:   Admit Date: 12/18/2020    Interval History: renal function worsening,Hb<7, getting 1 unit PRBC     Medications:   Scheduled Meds:   sodium chloride  20 mL Intravenous Once    [START ON 12/23/2020] heparin (porcine)  5,000 Units Subcutaneous 3 times per day    levoFLOXacin  250 mg Oral Daily    sodium bicarbonate  1,300 mg Oral BID    [START ON 12/28/2020] darbepoetin dahlia-polysorbate  25 mcg Subcutaneous Weekly - Monday    aspirin  81 mg Oral Daily    insulin glargine  10 Units Subcutaneous Nightly    vitamin B-12  500 mcg Oral Daily    sodium chloride flush  10 mL Intravenous 2 times per day    insulin lispro  0-6 Units Subcutaneous TID     insulin lispro  0-3 Units Subcutaneous Nightly     Continuous Infusions:   dextrose         CBC:   Recent Labs     12/21/20  0606 12/22/20  0757   WBC 21.2* 19.8*   HGB 7.9* 6.8*   * 427*     CMP:    Recent Labs     12/20/20  0722 12/21/20  0606 12/22/20  0659   * 131* 129*   K 4.3  4.3 4.4  4.4 4.1  4.1    106 105   CO2 11* 14* 14*   BUN 49* 49* 54*   CREATININE 2.59* 3.10* 3.28*   GLUCOSE 194* 190* 136*   CALCIUM 8.0* 8.3* 7.8*   LABGLOM 23.5* 19.1* 17.9*     Troponin:   No results for input(s): TROPONINI in the last 72 hours. BNP: No results for input(s): BNP in the last 72 hours. INR: No results for input(s): INR in the last 72 hours. Lipids: No results for input(s): CHOL, LDLDIRECT, TRIG, HDL, AMYLASE, LIPASE in the last 72 hours. Liver:   Recent Labs     12/20/20  0722   LABALBU 1.8*     Iron:    Recent Labs     12/20/20  0722   FERRITIN 221.9     Urinalysis: No results for input(s): UA in the last 72 hours.     Objective:   Vitals: BP (!) 107/37   Pulse 86   Temp 97.5 °F (36.4 °C)   Resp 16   Ht 5' 10\" (1.778 m)   Wt 150 lb (68 kg)   SpO2 98%   BMI 21.52 kg/m²    Wt Readings from Last 3 Encounters:   12/18/20 150 lb (68 kg)   03/18/20 175 lb (79.4 kg)   03/04/20 186 lb (84.4 kg)      24HR INTAKE/OUTPUT:      Intake/Output Summary (Last 24 hours) at 12/22/2020 1427  Last data filed at 12/22/2020 1227  Gross per 24 hour   Intake 3268 ml   Output -- Net 3268 ml       Constitutional:  Alert, awake, no apparent distress  HEENT:sclera anicteric.   Head atraumatic normocephalic, hard of hearing   Cardiovascular:  S1, S2 without m/r/g   Respiratory:  CTA B without w/r/r diminished air entry bibasilar  Abdomen: +bs, soft, nt  Ext: No LE edema or sacral dependent edema  Neuro:Alert and oriented with no deficit      Electronically signed by Lm Lee MD on 12/22/2020 at 2:27 PM

## 2020-12-22 NOTE — CARE COORDINATION
Pt is from home with his wife who would like DC to [address] who can accept when indicated. Report received that a son (Thanh?)called to share that he felt pt she go to a SNF [place]. Phone call from a son Tommy Medina stating he also feels pt should go to a SNF at [place]. LSW explained that wife is the decision maker unless pt completes POA paperwork. LSW met with pt who confirmed DC to [facility]. He said his family can decide where he goes. LSW attempted to discuss with him completing POA paperwork, but pt is very [place] and seemed to be having trouble understanding.   [id number] complete.

## 2020-12-22 NOTE — PROGRESS NOTES
Screening  Patient Currently in Pain: No  Pre Treatment Pain Screening  Pain at present: 0    Post-Session Pain Report  Pain Assessment  Pain Level: 0     OBJECTIVE      Bed mobility  Rolling to Left: Maximum assistance  Rolling to Right: Maximum assistance  Supine to Sit: Unable to assess(deferred d/t acute drop in Hgb, awaiting further medical management, RN aware)  Sit to Supine: Unable to assess  Scooting: Dependent/Total;2 Person assistance  Comment: Pt assisted in repositining onto R side. VC to improve participation of pt. Pt requires frequent repositioning d/t hx of pressure injuries to buttocks. ASSESSMENT     PT treatment limited to bed mobility and positioning based on clinical judgement d/t down trending Hgb 7.9 to 6.8. Recommend frequent repositioning d/t hx of pressure injury. Pt has been limited in his functional mobility throughout admission. Recommend continued PT to address mobility deficits. Discharge Recommendations:  Continue to assess pending progress, Patient would benefit from continued therapy after discharge    Goals  Long term goals  Long term goal 1: Pt will demonstrate bed mobility SBA for return to prior level of function. Long term goal 2: Pt will demonstrate transfers SBA with WW or safest AD to allow for safe functional mobility. Long term goal 3: Pt will demonstrate amb 150ft with WW or safest AD SBA to allow for safe amb inside pt's home  Long term goal 4: Pt will demonstrate stair negotiation 1 flight with 1 rail SBA to allow pt to go upstairs to bedroom.   Patient Goals   Patient goals : \"go home with my wife\"    PLAN    Times per week: 3-6        [facility] (6 CLICK) [address] Inpatient Mobility Raw Score : 9     Therapy Time   Individual   Time In  0846   Time Out  0855   Minutes  9   Bed mobility 9 min        Carmita Leavitt, PT, 12/22/20 at 9:00 AM         Definitions for assistance levels  Independent = pt does not require any physical supervision or assistance from another person for activity completion. Device may be needed.   Stand by assistance = pt requires verbal cues or instructions from another person, close to but not touching, to perform the activity  Minimal assistance= pt performs 75% or more of the activity; assistance is required to complete the activity  Moderate assistance= pt performs 50% of the activity; assistance is required to complete the activity  Maximal assistance = pt performs 25% of the activity; assistance is required to complete the activity  Dependent = pt requires total physical assistance to accomplish the task

## 2020-12-22 NOTE — PROGRESS NOTES
Strength  Improve Functional Transfers  Improve ADL [place]    Minutes:    OT Individual Minutes  Time In: 4785  Time Out: 36  Minutes: 19        Electronically signed by:    Marga Rodgers  12/22/2020, 11:33 AM Occupational Therapy

## 2020-12-22 NOTE — FLOWSHEET NOTE
Patient was approved for [facility] per Desmond Galvan but patient was not discharged per Dr Wendelin Fothergill who was covering for Dr Tip Diaz due to worsening of renal function.

## 2020-12-22 NOTE — PROGRESS NOTES
Infectious Disease     Patient Name: Elsa Tejada  Date: 12/22/2020  YOB: 1932  Medical Record Number: [id number]      UTI          Urinalysis shows significant inflammation  cells per high-power field  White blood cell count is elevated 15,000  Urine from 10/2019 grew MRSA      COVID-19 testing negative      Chest x-ray shows no infiltrate      Review of Systems   Constitutional: Negative for chills and fatigue. Respiratory: Negative. Cardiovascular: Negative. Gastrointestinal: Negative. Physical Exam   Cardiovascular: Normal heart sounds. No murmur heard. Pulmonary/Chest: Effort normal and breath sounds normal. No respiratory distress. He has no wheezes. He has no rales. Abdominal: Soft. He exhibits no distension. There is no abdominal tenderness. There is no rebound and no guarding. Blood pressure (!) 119/43, pulse 98, temperature 99.9 °F (37.7 °C), temperature source Oral, resp. rate 16, height 5' 10\" (1.778 m), weight 150 lb (68 kg), SpO2 97 %.       .   Lab Results   Component Value Date    WBC 19.8 (H) 12/22/2020    HGB 6.8 (LL) 12/22/2020    HCT 22.5 (L) 12/22/2020    MCV 88.6 12/22/2020     (H) 12/22/2020     Lab Results   Component Value Date     12/22/2020    K 4.1 12/22/2020    K 4.1 12/22/2020     12/22/2020    CO2 14 12/22/2020    BUN 54 12/22/2020    CREATININE 3.28 12/22/2020    GLUCOSE 136 12/22/2020    CALCIUM 7.8 12/22/2020          Microscopic Urinalysis [[id number]] (Abnormal) Collected: 12/19/20 1011       Updated: 12/19/20 1204      Mucus, UA Present /LPF       WBC, UA  /HPF       RBC, UA 5-10 /HPF       Epithelial Cells, UA 0-2 /HPF       Bacteria, UA MODERATE /HPF       Amorphous, UA 2+     Hemoglobin A1c [[id number]] (Abnormal) Collected: 12/19/20 0727     Specimen: Blood Updated: 12/19/20 1203      Hemoglobin A1C 9.2 %      Urinalysis [[id number]] (Abnormal) Collected: 12/19/20 1011     Specimen: Urine, clean catch Updated: 12/19/20 1202      Color, UA JUDY      Clarity, UA TURBID      Glucose, Ur Negative mg/dL       Bilirubin Urine Negative      Ketones, Urine Negative mg/dL       Specific Gravity, UA 1.010      Blood, Urine LARGE      pH, UA 8.0      Protein,  mg/dL       Urobilinogen, Urine 0.2 E.U./dL       Nitrite, Urine Negative      Leukocyte Esterase, Urine MODERATE       Blood and urine cultures are negative      PLAN:    UTI  oral Levaquin x 7 days

## 2020-12-22 NOTE — PROGRESS NOTES
Patient was assessed and charted on by this RN. Patient is alert and oriented time three. He is very hard of hearing and is blind in his right eye. He is not as talkative as he was on Sunday. He said he doesn't feel to good today. He has swelling in his scrotum and has been incontinent of urine. Urine looks like it has cleared up compared to his urine this weekend. Spoke with wife once about the patient. She just asked how he was and I told her he was getting a unit of blood and his kidneys were worse. No new complaints at this time.

## 2020-12-23 VITALS
DIASTOLIC BLOOD PRESSURE: 60 MMHG | HEART RATE: 94 BPM | SYSTOLIC BLOOD PRESSURE: 125 MMHG | RESPIRATION RATE: 17 BRPM | TEMPERATURE: 99.1 F | WEIGHT: 150 LBS | OXYGEN SATURATION: 97 % | HEIGHT: 70 IN | BODY MASS INDEX: 21.47 KG/M2

## 2020-12-23 LAB
ANION GAP SERPL CALCULATED.3IONS-SCNC: 11 MEQ/L (ref 9–15)
BASOPHILS ABSOLUTE: 0.1 K/UL (ref 0–0.2)
BASOPHILS RELATIVE PERCENT: 0.7 %
BLOOD CULTURE, ROUTINE: NORMAL
BUN BLDV-MCNC: 57 MG/DL (ref 8–23)
CALCIUM SERPL-MCNC: 8.4 MG/DL (ref 8.5–9.9)
CHLORIDE BLD-SCNC: 101 MEQ/L (ref 95–107)
CO2: 16 MEQ/L (ref 20–31)
CREAT SERPL-MCNC: 3.38 MG/DL (ref 0.7–1.2)
CULTURE, BLOOD 2: NORMAL
EOSINOPHILS ABSOLUTE: 0.2 K/UL (ref 0–0.7)
EOSINOPHILS RELATIVE PERCENT: 1.1 %
GFR AFRICAN AMERICAN: 20.9
GFR NON-AFRICAN AMERICAN: 17.3
GLUCOSE BLD-MCNC: 187 MG/DL (ref 60–115)
GLUCOSE BLD-MCNC: 268 MG/DL (ref 60–115)
GLUCOSE BLD-MCNC: 92 MG/DL (ref 60–115)
GLUCOSE BLD-MCNC: 95 MG/DL (ref 70–99)
HCT VFR BLD CALC: 25.6 % (ref 42–52)
HEMOGLOBIN: 8.3 G/DL (ref 14–18)
LYMPHOCYTES ABSOLUTE: 2.7 K/UL (ref 1–4.8)
LYMPHOCYTES RELATIVE PERCENT: 14.5 %
MCH RBC QN AUTO: 28.4 PG (ref 27–31.3)
MCHC RBC AUTO-ENTMCNC: 32.2 % (ref 33–37)
MCV RBC AUTO: 88.1 FL (ref 80–100)
MONOCYTES ABSOLUTE: 1.5 K/UL (ref 0.2–0.8)
MONOCYTES RELATIVE PERCENT: 8 %
NEUTROPHILS ABSOLUTE: 14 K/UL (ref 1.4–6.5)
NEUTROPHILS RELATIVE PERCENT: 75.7 %
PDW BLD-RTO: 15.1 % (ref 11.5–14.5)
PERFORMED ON: ABNORMAL
PERFORMED ON: ABNORMAL
PERFORMED ON: NORMAL
PLATELET # BLD: 457 K/UL (ref 130–400)
POTASSIUM REFLEX MAGNESIUM: 4 MEQ/L (ref 3.4–4.9)
POTASSIUM SERPL-SCNC: 4 MEQ/L (ref 3.4–4.9)
RBC # BLD: 2.91 M/UL (ref 4.7–6.1)
SODIUM BLD-SCNC: 128 MEQ/L (ref 135–144)
WBC # BLD: 18.5 K/UL (ref 4.8–10.8)

## 2020-12-23 PROCEDURE — 6370000000 HC RX 637 (ALT 250 FOR IP): Performed by: INTERNAL MEDICINE

## 2020-12-23 PROCEDURE — 6370000000 HC RX 637 (ALT 250 FOR IP): Performed by: STUDENT IN AN ORGANIZED HEALTH CARE EDUCATION/TRAINING PROGRAM

## 2020-12-23 PROCEDURE — 99232 SBSQ HOSP IP/OBS MODERATE 35: CPT | Performed by: INTERNAL MEDICINE

## 2020-12-23 PROCEDURE — 80048 BASIC METABOLIC PNL TOTAL CA: CPT

## 2020-12-23 PROCEDURE — 85025 COMPLETE CBC W/AUTO DIFF WBC: CPT

## 2020-12-23 PROCEDURE — 2580000003 HC RX 258: Performed by: STUDENT IN AN ORGANIZED HEALTH CARE EDUCATION/TRAINING PROGRAM

## 2020-12-23 PROCEDURE — 2580000003 HC RX 258: Performed by: INTERNAL MEDICINE

## 2020-12-23 PROCEDURE — 99221 1ST HOSP IP/OBS SF/LOW 40: CPT | Performed by: UROLOGY

## 2020-12-23 PROCEDURE — 36415 COLL VENOUS BLD VENIPUNCTURE: CPT

## 2020-12-23 PROCEDURE — 6360000002 HC RX W HCPCS: Performed by: STUDENT IN AN ORGANIZED HEALTH CARE EDUCATION/TRAINING PROGRAM

## 2020-12-23 PROCEDURE — 97535 SELF CARE MNGMENT TRAINING: CPT

## 2020-12-23 RX ORDER — ALFUZOSIN HYDROCHLORIDE 10 MG/1
10 TABLET, EXTENDED RELEASE ORAL
Qty: 30 TABLET | Refills: 3 | Status: SHIPPED | OUTPATIENT
Start: 2020-12-24

## 2020-12-23 RX ORDER — ALFUZOSIN HYDROCHLORIDE 10 MG/1
10 TABLET, EXTENDED RELEASE ORAL
Status: DISCONTINUED | OUTPATIENT
Start: 2020-12-24 | End: 2020-12-23 | Stop reason: HOSPADM

## 2020-12-23 RX ORDER — SODIUM BICARBONATE 650 MG/1
1300 TABLET ORAL 3 TIMES DAILY
Qty: 90 TABLET | Refills: 3
Start: 2020-12-23

## 2020-12-23 RX ORDER — LEVOFLOXACIN 250 MG/1
250 TABLET ORAL DAILY
Qty: 5 TABLET | Refills: 0
Start: 2020-12-24 | End: 2020-12-29

## 2020-12-23 RX ADMIN — SODIUM BICARBONATE 1300 MG: 650 TABLET ORAL at 15:21

## 2020-12-23 RX ADMIN — Medication 10 ML: at 10:20

## 2020-12-23 RX ADMIN — HEPARIN SODIUM 5000 UNITS: 5000 INJECTION INTRAVENOUS; SUBCUTANEOUS at 15:22

## 2020-12-23 RX ADMIN — LEVOFLOXACIN 250 MG: 250 TABLET, FILM COATED ORAL at 09:53

## 2020-12-23 RX ADMIN — SODIUM BICARBONATE 1300 MG: 650 TABLET ORAL at 09:53

## 2020-12-23 RX ADMIN — IRON SUCROSE 200 MG: 20 INJECTION, SOLUTION INTRAVENOUS at 10:42

## 2020-12-23 RX ADMIN — ASPIRIN 81 MG: 81 TABLET, CHEWABLE ORAL at 09:53

## 2020-12-23 RX ADMIN — HEPARIN SODIUM 5000 UNITS: 5000 INJECTION INTRAVENOUS; SUBCUTANEOUS at 06:10

## 2020-12-23 RX ADMIN — CYANOCOBALAMIN TAB 500 MCG 500 MCG: 500 TAB at 09:53

## 2020-12-23 ASSESSMENT — PAIN SCALES - GENERAL
PAINLEVEL_OUTOF10: 0
PAINLEVEL_OUTOF10: 0

## 2020-12-23 ASSESSMENT — ENCOUNTER SYMPTOMS
RESPIRATORY NEGATIVE: 1
GASTROINTESTINAL NEGATIVE: 1

## 2020-12-23 NOTE — PROGRESS NOTES
Infectious Disease     Patient Name: Shay Quinn  Date: 12/23/2020  YOB: 1932  Medical Record Number: [id number]      UTI      Urinalysis shows significant inflammation  cells per high-power field  White blood cell count is elevated 15,000  Urine from 10/2019 grew MRSA          Review of Systems   Constitutional: Negative for chills and fatigue. Respiratory: Negative. Cardiovascular: Negative. Gastrointestinal: Negative. Physical Exam   Cardiovascular: Normal heart sounds. No murmur heard. Pulmonary/Chest: Effort normal and breath sounds normal. No respiratory distress. He has no wheezes. He has no rales. Abdominal: Soft. He exhibits no distension. There is no abdominal tenderness. There is no rebound and no guarding. Blood pressure 125/60, pulse 94, temperature 99.1 °F (37.3 °C), temperature source Oral, resp. rate 17, height 5' 10\" (1.778 m), weight 150 lb (68 kg), SpO2 97 %.       .   Lab Results   Component Value Date    WBC 18.5 (H) 12/23/2020    HGB 8.3 (L) 12/23/2020    HCT 25.6 (L) 12/23/2020    MCV 88.1 12/23/2020     (H) 12/23/2020     Lab Results   Component Value Date     12/23/2020    K 4.0 12/23/2020    K 4.0 12/23/2020     12/23/2020    CO2 16 12/23/2020    BUN 57 12/23/2020    CREATININE 3.38 12/23/2020    GLUCOSE 95 12/23/2020    CALCIUM 8.4 12/23/2020          Microscopic Urinalysis [[id number]] (Abnormal) Collected: 12/19/20 1011       Updated: 12/19/20 1204      Mucus, UA Present /LPF       WBC, UA  /HPF       RBC, UA 5-10 /HPF       Epithelial Cells, UA 0-2 /HPF       Bacteria, UA MODERATE /HPF       Amorphous, UA 2+     Hemoglobin A1c [[id number]] (Abnormal) Collected: 12/19/20 0727     Specimen: Blood Updated: 12/19/20 1203      Hemoglobin A1C 9.2 %      Urinalysis [[id number]] (Abnormal) Collected: 12/19/20 1011     Specimen: Urine, clean catch Updated: 12/19/20 1202      Color, UA JUDY      Clarity, UA TURBID     Glucose, Ur Negative mg/dL       Bilirubin Urine Negative      Ketones, Urine Negative mg/dL       Specific Gravity, UA 1.010      Blood, Urine LARGE      pH, UA 8.0      Protein,  mg/dL       Urobilinogen, Urine 0.2 E.U./dL       Nitrite, Urine Negative      Leukocyte Esterase, Urine MODERATE       Blood and urine cultures are negative      PLAN:  UTI  oral Levaquin x 6 days

## 2020-12-23 NOTE — PROGRESS NOTES
Physical Therapy Med Surg Daily Treatment Note  Facility/Department: [facility]  Room: [place] [address]       NAME: Emely Ortega  : 1932 (80 y.o.)  MRN: [id number]  CODE STATUS: DNR-CCA    Date of Service: 2020    Patient Diagnosis(es): Weakness [R53.1]   Chief Complaint   Patient presents with    Fatigue     Patient Active Problem List    Diagnosis Date Noted    Hydronephrosis     General weakness 2020    Chronic kidney disease, stage IV (severe) ([address] 2020    Anemia of chronic renal failure 2020    Ataxic gait     Dementia due to Alzheimer's disease ([address]     Bradycardia 10/03/2019    Fall at home 10/03/2019    Hyponatremia 10/03/2019    Hyperkalemia 10/03/2019    Hypotension 10/03/2019    Paroxysmal A-fib ([address]     Hypertension     Anemia in CKD (chronic kidney disease)     Gram negative sepsis (HCC)     Klebsiella pneumoniae sepsis (HCC)     Uncontrolled type 2 diabetes mellitus with hyperglycemia ([address]     Pneumonia 2019        Past Medical History:   Diagnosis Date    Acute metabolic encephalopathy     Anemia in CKD (chronic kidney disease)     Blind right eye     Chronic hyponatremia     CKD (chronic kidney disease) stage 4, GFR 15-29 ml/min (HCC)     Diabetes mellitus ([address]     Hypertension     Kidney stone     Klebsiella pneumoniae sepsis (HCC)     Mixed hyperlipidemia     Obstructive uropathy     Paroxysmal A-fib (HCC)     Pneumonia     Sacral ulcer ([address]     Urethral stricture     UTI due to Klebsiella species      Past Surgical History:   Procedure Laterality Date    EYE SURGERY      KIDNEY STONE SURGERY         Restrictions  Restrictions/Precautions: Fall Risk;Contact Precautions(high lopez score; MRSA; pressure injury to buttocks present)    SUBJECTIVE   General  Chart Reviewed: Yes  Family / Caregiver Present: No  Subjective  Subjective: \"I was just trying to get a nap. \"    Pre-Session Pain Report  Pre Treatment Pain Screening  Pain at present: 0  Scale Used: Numeric Score  Intervention List: Patient able to continue with treatment  Pain Screening  Patient Currently in Pain: No       Post-Session Pain Report  Pain Assessment  Pain Assessment: 0-10  Pain Level: 0         OBJECTIVE        Bed mobility  Supine to Sit: Moderate assistance  Sit to Supine: Moderate assistance  Comment: vc's for technique and sequencing, pt pulls from therapist to complete, pt needing assistance lifting BLE into bed. Transfers  Sit to Stand: Moderate Assistance  Stand to sit: Moderate Assistance  Comment: B HHA, pt able to stand for ~1 minute, FF posture, vc's for improved posture. pt then requests to lay back down. Activity Tolerance  Activity Tolerance: Patient limited by fatigue;Patient limited by endurance          ASSESSMENT   Assessment: pt able to progress to STS transfers but not able to ambulate this session. Discharge Recommendations:  Continue to assess pending progress, Patient would benefit from continued therapy after discharge    Goals  Long term goals  Long term goal 1: Pt will demonstrate bed mobility SBA for return to prior level of function. Long term goal 2: Pt will demonstrate transfers SBA with WW or safest AD to allow for safe functional mobility. Long term goal 3: Pt will demonstrate amb 150ft with WW or safest AD SBA to allow for safe amb inside pt's home  Long term goal 4: Pt will demonstrate stair negotiation 1 flight with 1 rail SBA to allow pt to go upstairs to bedroom.   Patient Goals   Patient goals : \"go home with my wife\"    PLAN    Times per week: 3-6  Safety Devices  Type of devices: Bed alarm in place, Call light within reach, Left in bed, Nurse notified     [facility] ([address] [place] [address]) [address] Mobility Raw Score : 9      Therapy Time   Individual   Time In 1524   Time Out 1539   Minutes 15      BM/TrsF: [address] PTA, 12/23/20 at

## 2020-12-23 NOTE — PROGRESS NOTES
Scheduled Meds:   [START ON 12/24/2020] alfuzosin  10 mg Oral Daily with breakfast    heparin (porcine)  5,000 Units Subcutaneous 3 times per day    iron sucrose  200 mg Intravenous Q24H    sodium bicarbonate  1,300 mg Oral TID    levoFLOXacin  250 mg Oral Daily    [START ON 12/28/2020] darbepoetin dahlia-polysorbate  25 mcg Subcutaneous Weekly - Monday    aspirin  81 mg Oral Daily    insulin glargine  10 Units Subcutaneous Nightly    vitamin B-12  500 mcg Oral Daily    sodium chloride flush  10 mL Intravenous 2 times per day    insulin lispro  0-6 Units Subcutaneous TID     insulin lispro  0-3 Units Subcutaneous Nightly     Continuous Infusions:   dextrose         CBC:   Recent Labs     12/22/20  0757 12/23/20  0607   WBC 19.8* 18.5*   HGB 6.8* 8.3*   * 457*     CMP:    Recent Labs     12/21/20  0606 12/22/20  0659 12/23/20  0607   * 129* 128*   K 4.4  4.4 4.1  4.1 4.0  4.0    105 101   CO2 14* 14* 16*   BUN 49* 54* 57*   CREATININE 3.10* 3.28* 3.38*   GLUCOSE 190* 136* 95   CALCIUM 8.3* 7.8* 8.4*   LABGLOM 19.1* 17.9* 17.3*     Troponin:   No results for input(s): TROPONINI in the last 72 hours. BNP: No results for input(s): BNP in the last 72 hours. INR: No results for input(s): INR in the last 72 hours. Lipids: No results for input(s): CHOL, LDLDIRECT, TRIG, HDL, AMYLASE, LIPASE in the last 72 hours. Liver:   No results for input(s): AST, ALT, ALKPHOS, PROT, LABALBU, BILITOT in the last 72 hours. Invalid input(s): BILDIR  Iron:    No results for input(s): IRONS, FERRITIN in the last 72 hours. Invalid input(s): LABIRONS  Urinalysis: No results for input(s): UA in the last 72 hours.     Objective:   Vitals: /60   Pulse 94   Temp 99.1 °F (37.3 °C) (Oral)   Resp 17   Ht 5' 10\" (1.778 m)   Wt 150 lb (68 kg)   SpO2 97%   BMI 21.52 kg/m²    Wt Readings from Last 3 Encounters:   12/18/20 150 lb (68 kg)   03/18/20 175 lb (79.4 kg)   03/04/20 186 lb (84.4 kg) 24HR INTAKE/OUTPUT:      Intake/Output Summary (Last 24 hours) at 12/23/2020 1332  Last data filed at 12/22/2020 1638  Gross per 24 hour   Intake 711 ml   Output --   Net 711 ml       Constitutional:  Alert, awake, no apparent distress  HEENT:sclera anicteric.   Head atraumatic normocephalic, hard of hearing   Cardiovascular:  S1, S2 without m/r/g   Respiratory:  CTA B without w/r/r diminished air entry bibasilar  Abdomen: +bs, soft, nt  Ext: 1+ edema  Neuro:Alert and oriented with no deficit      Electronically signed by Woody Bhatt MD on 12/23/2020 at 1:32 PM

## 2020-12-24 LAB
BLOOD BANK DISPENSE STATUS: NORMAL
BLOOD BANK DISPENSE STATUS: NORMAL
BLOOD BANK PRODUCT CODE: NORMAL
BLOOD BANK PRODUCT CODE: NORMAL
BPU ID: NORMAL
BPU ID: NORMAL
DESCRIPTION BLOOD BANK: NORMAL
DESCRIPTION BLOOD BANK: NORMAL

## 2020-12-24 NOTE — CONSULTS
well.  He has no  other current complaints. PAST MEDICAL HISTORY:  Again includes chronic kidney disease, diabetes,  hypertension, AFib, sacral ulceration, history of urethral stricture. PAST SURGICAL HISTORY:  Includes kidney stone surgery and eye surgery. FAMILY HISTORY:  He denies any family history of genitourinary  malignancies. SOCIAL HISTORY:  Quit cigarette smoking years ago. MEDICATIONS:  His home medications include aspirin, vitamin B12,  losartan, Humalog, Lantus, Norvasc, Lopressor, Lipitor. The patient had  been on Flomax in the past by Dr. Kirk Fernandez, but he is no longer taking  this currently, this will likely need to be resumed. REVIEW OF SYSTEMS:  Negative unless as outlined in the history of  present illness. PHYSICAL EXAMINATION:  GENERAL:  He is a frail-appearing white male lying in bed, in no obvious  acute distress. VITAL SIGNS:  His temperature is 37.3, heart rate 94, respiratory rate  17, blood pressure 125/60. HEENT:  He is atraumatic and normocephalic. His eyes showed normal  conjunctivae. CHEST:  His lungs were clear anteriorly without rhonchi, wheezing or  rales. CARDIOVASCULAR:  His heart was regular. ABDOMEN:  Soft, nondistended, nontender. He had no palpable  organomegaly. He had no palpable abdominal hernias. He had no CVA  tenderness. GENITOURINARY:  Revealed a normal male phallus with some scrotal edema,  some mild erythema of the scrotum. There was perineal induration and  tenderness with some erythema likely related to his history of sacral  ulceration, which was not evaluated. EXTREMITIES:  Showed no edema. NEUROLOGICAL:  He was alert. PSYCHIATRIC:  He had a normal affect. LABORATORY DATA:  He has a white count today which has somewhat improved  down to 18.5 with hematocrit of 25, hemoglobin 8.3 and a platelet count  of 856. His creatinine is 3.3 with a BUN of 57.   He had an urinalysis  on admission, which was nitrite negative, moderate amount of leukocytes,  large amount of blood noted. His lactic acid on 12/18 was 0.7. His  urine culture on 12/18 showed no growth. He had a renal ultrasound done yesterday showing bilateral  hydronephrosis with some echogenic material in the bladder, likely  representing debris. IMPRESSION:  An 40-year-old male with multiple medical problems,  currently on heparin with history of chronic kidney disease, prior UTIs,  blindness, decreased hearing, diabetes, hypertension, AFib and history  of difficult catheterization apparently due to urethral stricture  managed by Dr. Jimenez Sharpe in the past.  At this point, the patient has  multifactorial reasons for his worsened renal function and may in part  be due to infection given his white blood count and dehydration. At  this point, I would not recommend Lopez catheterization as he has  refused this in the past and it is certainly likely to be difficult  given his history of urethral stricture. The patient is currently  agreeable to a Lopez catheter, and given his complexity, I do not think  this is unreasonable for now. I would continue with gentle hydration  managing his infection and we will start him on Flomax as well. I would  consider an external condom catheter to help him with the incontinence,  which is likely causing chronic skin changes and we will continue to  follow him through his stay here. These recommendations were discussed  with Dr. Jan Pathak today and the patient's nurse and the patient.         Tylor Carrizales MD    D: 12/23/2020 13:21:58       T: 12/23/2020 13:29:35     [email or web address]  Job#: [id number]     Doc#: [id number]    CC:  Merlinda Cancer, MD

## 2020-12-24 NOTE — PROGRESS NOTES
Physical Therapy  Facility/Department: [facility] MED SURG X322/[id number]  Physical Therapy Discharge      NAME: Elvis Rodriguez    : 1932 (80 y.o.)  MRN: [id number]    Account: [de-identified]  Gender: male      Patient has been discharged from acute care hospital. DC patient from current PT program.      Electronically signed by Kevin Bryson PT on 20 at 2:53 PM EST

## 2020-12-28 ENCOUNTER — OFFICE VISIT (OUTPATIENT)
Dept: GERIATRIC MEDICINE | Age: 85
End: 2020-12-28
Payer: MEDICARE

## 2020-12-28 DIAGNOSIS — E11.65 UNCONTROLLED TYPE 2 DIABETES MELLITUS WITH HYPERGLYCEMIA (HCC): Chronic | ICD-10-CM

## 2020-12-28 DIAGNOSIS — N39.0 RECURRENT UTI: Primary | ICD-10-CM

## 2020-12-28 PROCEDURE — 99308 SBSQ NF CARE LOW MDM 20: CPT | Performed by: PHYSICIAN ASSISTANT

## 2021-01-05 ENCOUNTER — OFFICE VISIT (OUTPATIENT)
Dept: GERIATRIC MEDICINE | Age: 86
End: 2021-01-05
Payer: MEDICARE

## 2021-01-05 DIAGNOSIS — E11.65 UNCONTROLLED TYPE 2 DIABETES MELLITUS WITH HYPERGLYCEMIA (HCC): Primary | Chronic | ICD-10-CM

## 2021-01-05 DIAGNOSIS — R73.9 HYPERGLYCEMIA: ICD-10-CM

## 2021-01-05 LAB
HCT VFR BLD CALC: 28.4 % (ref 41–53)
HEMOGLOBIN: 8.8 G/DL (ref 13.5–17.5)

## 2021-01-05 PROCEDURE — 99308 SBSQ NF CARE LOW MDM 20: CPT | Performed by: PHYSICIAN ASSISTANT

## 2021-01-07 ENCOUNTER — OFFICE VISIT (OUTPATIENT)
Dept: GERIATRIC MEDICINE | Age: 86
End: 2021-01-07
Payer: MEDICARE

## 2021-01-07 DIAGNOSIS — N39.0 RECURRENT UTI: ICD-10-CM

## 2021-01-07 DIAGNOSIS — G93.41 ENCEPHALOPATHY, METABOLIC: Primary | ICD-10-CM

## 2021-01-07 DIAGNOSIS — R53.1 WEAKNESS GENERALIZED: ICD-10-CM

## 2021-01-07 PROCEDURE — 99316 NF DSCHRG MGMT 30 MIN+: CPT | Performed by: PHYSICIAN ASSISTANT

## 2021-01-07 ASSESSMENT — ENCOUNTER SYMPTOMS
VOMITING: 0
SHORTNESS OF BREATH: 0
CHOKING: 0
DIARRHEA: 0
ABDOMINAL PAIN: 0
NAUSEA: 0
COUGH: 0
ABDOMINAL DISTENTION: 0
WHEEZING: 0

## 2021-01-07 NOTE — PROGRESS NOTES
PATIENT: Kenneth Fierro : 1932 DOS: 2021       [facility]       REASON FOR VISIT: Patient is being seen today for discharge summary. SUBJECTIVE: Patient initially admitted to facility for fall and weakness while at home. Per hospital record patient's wife had a foot surgery and was not able to take care of him upon discharge from hospital.  Placed on meds for recovery. States today that he is ready to go home, he is agreeable to home health care/PT/OT nursing. He denies any new chest pain shortness of breath wheezing or POI. He is globally weak but is making improvements overall. We will begin definitive need for home health care assistance. Likely a possible long-term care resident if similar situation repeat itself. Patient has had has history of UTI, no new sequelae noted at this time. . This 78-year-old male was seen in his room. Nursing staff report no new acute complaints. Patient is eating and drinking well. Very jovial pleasant cooperative. Does have some mild forgetfulness, although he is otherwise alert and oriented x3/3. We will do labs prior to discharge to ensure patient is stabilized. Otherwise no new orders    @No family history on file.     Social History     Socioeconomic History    Marital status:      Spouse name: Not on file    Number of children: Not on file    Years of education: Not on file    Highest education level: Not on file   Occupational History    Not on file   Social Needs    Financial resource strain: Not on file    Food insecurity     Worry: Not on file     Inability: Not on file    Transportation needs     Medical: Not on file     Non-medical: Not on file   Tobacco Use    Smoking status: Former Smoker    Smokeless tobacco: Never Used   Substance and Sexual Activity    Alcohol use: Not Currently    Drug use: Never    Sexual activity: Not on file   Lifestyle    Physical activity     Days per week: Not on file Minutes per session: Not on file    Stress: Not on file   Relationships    Social connections     Talks on phone: Not on file     Gets together: Not on file     Attends [employer] service: Not on file     Active member of club or organization: Not on file     Attends meetings of clubs or organizations: Not on file     Relationship status: Not on file    Intimate partner violence     Fear of current or ex partner: Not on file     Emotionally abused: Not on file     Physically abused: Not on file     Forced sexual activity: Not on file   Other Topics Concern    Not on file   Social History Narrative    Not on file       MEDICATIONS:  Patient currently taking APAP 650 mg every 4 hours as needed, alfuzosin HCl ER 10 mg p.o. every morning, aspirin 81 mg p.o. every morning, Basaglar 13 units subcu nightly, bisacodyl suppository 10 mg once rectally as needed, gabapentin alpha 25 mcg/mL 1 mL subcu every morning every Wednesday with parameters to hold for hemoglobin greater than 12 mg/DL, Humalog 100 unit/mL per sliding scale 150-199 mg/DL =2 units; 200-249 mg/DL =4 units; 250-299 mg/DL =6 units; 300-349 mg/DL =8 units; 350-400 mg/DL =10 units; >400 mg/DL =10 units plus call MD/PA. Vitamin C tablet 500 mg p.o. every morning. .         Allergies   Allergen Reactions    Pcn [Penicillins]     Hctz [Hydrochlorothiazide] Rash         @Review of Systems   Constitutional: Negative for activity change, appetite change (good intake), chills, diaphoresis, fatigue and fever. HENT: Negative. Respiratory: Negative for cough, choking, shortness of breath and wheezing. Cardiovascular: Negative for chest pain and leg swelling. Gastrointestinal: Negative for abdominal distention, abdominal pain, diarrhea, nausea and vomiting. Genitourinary: Negative for decreased urine volume, difficulty urinating (denies) and dysuria. Musculoskeletal: Positive for arthralgias (generalided, mild).    Neurological: Positive for facial asymmetry (horiztonal gaze palsy (right)) and weakness. Negative for speech difficulty, numbness and headaches. Psychiatric/Behavioral: Positive for confusion (miild, a/ x3). Negative for agitation, behavioral problems, dysphoric mood and sleep disturbance. The patient is not nervous/anxious. All other systems reviewed and are negative. @Physical Exam  Constitutional:       General: He is not in acute distress. Appearance: Normal appearance. He is normal weight. He is not ill-appearing. HENT:      Head: Normocephalic and atraumatic. Nose: No congestion. Mouth/Throat:      Mouth: Mucous membranes are moist.      Pharynx: Oropharynx is clear. No oropharyngeal exudate or posterior oropharyngeal erythema. Eyes:      General: No scleral icterus. Conjunctiva/sclera: Conjunctivae normal.      Pupils: Pupils are equal, round, and reactive to light. Comments: Horizontal gaze palsy, right     Cardiovascular:      Rate and Rhythm: Normal rate and regular rhythm. Pulses: Normal pulses. Heart sounds: Normal heart sounds. Pulmonary:      Effort: Pulmonary effort is normal.      Breath sounds: Normal breath sounds. Abdominal:      General: Abdomen is flat. Bowel sounds are normal. There is no distension. Palpations: Abdomen is soft. Tenderness: There is no abdominal tenderness. There is no guarding. Musculoskeletal: Normal range of motion. Skin:     General: Skin is warm and dry. Neurological:      General: No focal deficit present. Mental Status: He is alert and oriented to person, place, and time. Psychiatric:         Mood and Affect: Mood normal.         Thought Content: Thought content normal.          ASSESSMENT AND PLAN:  1. Generalized weakness-patient will require assistance at home via home health care. Continue PT/OT program at home as well. 2.     History of metabolic encephalopathy-we will repeat BMP to assess prior to discharge.   Patient to follow-up with PCP within 1 week of discharge. 3.   History of bacteremia/sepsis related to UTI -we will check CBC without differential prior to discharge. Will repeat UA as needed. Patient follow-up with PCP. I have reviewed this resident's medication and treatment plan as well as most recent lab work. Resident will be discharged home on 1/11/2021. Pt  will continue to follow-up with PCP as an outpatient as ordered within 1 week of discharge. Pt may have HHC/nursing/PT/OT as needed/desired per pt tolerance and need. Greater than 30 minutes was spent in the discharge planning of this patient. It was a pleasure following with this patient while they resided at [address] [place] [address].

## 2021-01-08 ENCOUNTER — VIRTUAL VISIT (OUTPATIENT)
Dept: GERIATRIC MEDICINE | Age: 86
End: 2021-01-08
Payer: MEDICARE

## 2021-01-08 DIAGNOSIS — E87.5 HYPERKALEMIA: Primary | ICD-10-CM

## 2021-01-08 LAB
BASOPHILS ABSOLUTE: ABNORMAL
BASOPHILS RELATIVE PERCENT: ABNORMAL
BUN BLDV-MCNC: 60 MG/DL
CALCIUM SERPL-MCNC: 8.6 MG/DL
CHLORIDE BLD-SCNC: 99 MMOL/L
CO2: 22 MMOL/L
CREAT SERPL-MCNC: 2.6 MG/DL
EOSINOPHILS ABSOLUTE: ABNORMAL
EOSINOPHILS RELATIVE PERCENT: ABNORMAL
GFR CALCULATED: NORMAL
GLUCOSE BLD-MCNC: 102 MG/DL
HCT VFR BLD CALC: 30 % (ref 41–53)
HEMOGLOBIN: 9.7 G/DL (ref 13.5–17.5)
LYMPHOCYTES ABSOLUTE: ABNORMAL
LYMPHOCYTES RELATIVE PERCENT: ABNORMAL
MCH RBC QN AUTO: 29.8 PG
MCHC RBC AUTO-ENTMCNC: 32.2 G/DL
MCV RBC AUTO: 92.6 FL
MONOCYTES ABSOLUTE: ABNORMAL
MONOCYTES RELATIVE PERCENT: ABNORMAL
NEUTROPHILS ABSOLUTE: ABNORMAL
NEUTROPHILS RELATIVE PERCENT: ABNORMAL
PLATELET # BLD: 523 K/ΜL
PMV BLD AUTO: 7 FL
POTASSIUM SERPL-SCNC: 6.6 MMOL/L
RBC # BLD: 3.24 10^6/ΜL
SODIUM BLD-SCNC: 130 MMOL/L
WBC # BLD: 8.5 10^3/ML

## 2021-01-08 PROCEDURE — 99442 PR PHYS/QHP TELEPHONE EVALUATION 11-20 MIN: CPT | Performed by: PHYSICIAN ASSISTANT

## 2021-01-08 NOTE — PROGRESS NOTES
Ada Almaraz is a 80 y.o. male evaluated via telephone on 1/8/2021. Consent:  He and/or health care decision maker is aware that that he may receive a bill for this telephone service, depending on his insurance coverage, and has provided verbal consent to proceed: Yes      Documentation:  I communicated with the patient and/or health care decision maker about patient had reported BMP today showing elevated potassium of 6.6. Patient is not having any new chest pain, POI, shortness of breath, or evidence of palpitations. Patient did receive stat 15 g Kayexalate, with another 15 g at at bedtime. Will repeat potassium level in a.m. Any changes to vital signs or symptoms to be reported immediately to address. Patient does have a history of hyperkalemia as well as CKD. Details of this discussion including any medical advice provided: see above. I affirm this is a Patient Initiated Episode with a Patient who has not had a related appointment within my department in the past 7 days or scheduled within the next 24 hours.     Patient identification was verified at the start of the visit: Yes    Total Time: minutes: 11-20 minutes    Note: not billable if this call serves to triage the patient into an appointment for the relevant concern      Misty Huffman

## 2021-01-18 NOTE — DISCHARGE SUMMARY
Physician Discharge Summary     Patient ID:  Tamar Fraga  [id number]  85 y.o.  2/28/1932    Admit date: 12/18/2020    Discharge date and time: 12/23/2020  7:13 PM     Admitting Physician: Deven Gonzales DO     Discharge Physician: Dr. Dudley Scott    Admission Diagnoses: Weakness [R53.1]    Discharge Diagnoses: cheryle, ckd stage 4, uti, obstrutive uropathy    Admission Condition: fair    Discharged Condition: fair    Indication for Admission: UTI, need for abx    Hospital Course: 79 yo man with ckd stage 3-4. Admitted with weakness. Found to have UTI. Seen by ID. Finished course of abx. Seen by urology for bilateral hydro. Has chronic urethral stricture. Patient did not want figueroa. Urology was ok with this decision. Discussion with wife. Was seen by PT/OT. Got ivf. Renal function stabilized. Sent to [facility]. Consults: ID and urology    Significant Diagnostic Studies: labs: urine cx, renal function, etc    Treatments: IV hydration and antibiotics: Levaquin    Discharge Exam:  See progress note from day of disharge    Disposition: long term care facility    In process/preliminary results:  Outstanding Order Results     No orders found from 11/19/2020 to 12/19/2020.           Patient Instructions:   Discharge Medication List as of 12/23/2020  8:10 PM      START taking these medications    Details   sodium bicarbonate 650 MG tablet Take 2 tablets by mouth 3 times daily, Disp-90 tablet, R-3NO PRINT      levoFLOXacin (LEVAQUIN) 250 MG tablet Take 1 tablet by mouth daily for 5 days, Disp-5 tablet, R-0NO PRINT      darbepoetin dahlia-polysorbate (ARANESP) 25 MCG/ML injection Inject 1 mL into the skin once a week, Disp-4 mL, R-0NO PRINT      alfuzosin (UROXATRAL) 10 MG extended release tablet Take 1 tablet by mouth daily (with breakfast), Disp-30 tablet, R-3Normal         CONTINUE these medications which have NOT CHANGED    Details   aspirin 81 MG tablet Take 81 mg by mouth dailyHistorical Med      vitamin

## 2021-03-03 VITALS
RESPIRATION RATE: 18 BRPM | BODY MASS INDEX: 21.52 KG/M2 | SYSTOLIC BLOOD PRESSURE: 124 MMHG | WEIGHT: 150 LBS | TEMPERATURE: 98 F | DIASTOLIC BLOOD PRESSURE: 62 MMHG | HEART RATE: 89 BPM | OXYGEN SATURATION: 94 %

## 2021-03-03 NOTE — PROGRESS NOTES
PATIENT: Slim Levin : 1932 DOS: 2021     [facility]    Vital signs reviewed, see [facility]. HISTORY OF PRESENT ILLNESS:  The patient is being seen today for DM type 2 as well as hyperglycemia follow up. The patient's blood glucose is trending between 100 to 175 mg per dL on sliding scale insulin as basal Basaglar 15 units q.h.s. He is doing well overall. His last hemoglobin A1c approximately 1 to 1-1/2 weeks ago was 8.9. The patient was recently started on long-acting insulin increase at 13 units. Seems to be doing well and tolerating his diet without severely elevated hyperglycemia levels. We will continue following up with the patient and monitoring Accu-Cheks. We will order a hemoglobin A1c for 2021 and reassess and adjust med/insulin as needed. Otherwise, the patient is doing well without any acute issue of note per nursing staff. No new evidence of dyspnea, shortness of breath, POI, wheezing beyond his current baseline. No new evidence of neuropathy complications and/or renal changes. We will monitor BMP as well p.r.n. MEDICATIONS:  Reviewed. ALLERGIES:  Reviewed. REVIEW OF SYSTEMS:  See HPI. PHYSICAL EXAMINATION:  General:  Fair hygiene overall. Flat affect with brightening upon conversation. Pupils equal, round, reactive to light. EOMs are within normal limits. Cardiopulmonary:  LSCTA.  RRR. Intact distal pulses, +1 bilaterally. Abdomen:  Normal bowel sounds, soft, nontender abdomen. Mental Status: The patient is awake, alert, oriented 2/3. ASSESSMENT AND PLAN:  DM type 2/hyperglycemia Continue insulin orders as given. We will do hemoglobin A1c on 2021.         Electronically Signed By: Renee Pollock PA-C on 2021 14:10:32  ______________________________  Renee Pollock PA-C  /[id number]  D: 2021 18:18:56  T: 2021 02:58:56    cc: - [id number] [facility]

## 2021-03-03 NOTE — PROGRESS NOTES
Ashley Terry : 1932 DOS: 2020     [facility]    Seen at [facility] via face time. VITAL SIGNS:  124/62, 98.0 F, 89 bpm, 18 RR, SpO2 equals 94%. BG equals 295. Weight is 150 pounds. HISTORY OF PRESENT ILLNESS:  The patient is being seen today for history of DM type 2 as well as a positive UTI. The patient is currently on moderate sliding scale as well as Lantus 10 units q.h.s. subcu. Currently, BG this morning was 295, which is far out of healthy range. Does have history of lower leg neuropathy; however, not be seen today for that concern. The patient not complaining of any increased neuropathy symptoms. Additionally, the patient does have positive UTI. He is currently on levofloxacin 250 mg p.o. b.i.d. x5 days. We will follow up the patient upon finishing course of antibiotics for possible UA HORTENCIA. In the meantime, we will evaluate patient's BMP to assess kidney function as well as do a hemoglobin A1c to assess long-term DM type 2 care compliance and efficacy of treatment. The patient is currently denying any new weakness, fatigue beyond baseline. He does have some dementia symptoms. No new CP, orthopnea, PND, MARIE, cough, SOB, periodic wheezing, overall general cardiopulmonary complaints. Denies any GI or  complaints. The patient is a good eater and has fair p.o. intake of fluids. No new psych complaints. Remaining 14-point ROS is unremarkable. Nursing staff report no new additional complaints. MEDICATIONS:  Reviewed. ALLERGIES:  Reviewed. REVIEW OF SYSTEMS:  See HPI. PHYSICAL EXAMINATION:  General:  Fair hygiene, flat affect. No acute distress. HEENT:  No evidence of scars, bumps or deformities or head and neck trauma on limited exam.  Mental Status: The patient is awake, alert and oriented, 2/3. ASSESSMENT AND PLAN:  1. Positive UTI continue levofloxacin times remaining days.   We will do HORTENCIA as needed if patient is still symptomatic. 2.   DM type 2 Continue Humalog sliding scale as ordered. Increase Lantus to 13 units q.h.s. We will do more aggressive insulin order as needed. BMP and hemoglobin A1c as well. We will add oral medications as well if the patient's kidney function can tolerate. Electronically Signed By: Nannette Palm PA-C on 02/16/2021 12:57:54  ______________________________  Nannette Palm PA-C  KT/[id number]  D: 02/15/2021 14:02:07  T: 02/15/2021 15:03:40    cc: - [address] to the emergency declaration under the [facility], [facility] waiver authority and the [employer] Act, this Virtual Visit was conducted, with patient's consent, to reduce the patient's risk of exposure to COVID-19 and provide continuity of care for an established patient. Services were provided through a video synchronous discussion virtually to substitute for in-person clinic visit they will be billed for this visit and they agree to continue with assistance of NF staff and via VIRTUAL platform     Patient identification was verified at the start of the visit : YES    Total time spent on this encounter: Not billed by time.

## 2022-01-18 ENCOUNTER — APPOINTMENT (OUTPATIENT)
Dept: CT IMAGING | Age: 87
End: 2022-01-18
Payer: MEDICARE

## 2022-01-18 ENCOUNTER — APPOINTMENT (OUTPATIENT)
Dept: GENERAL RADIOLOGY | Age: 87
End: 2022-01-18
Payer: MEDICARE

## 2022-01-18 ENCOUNTER — HOSPITAL ENCOUNTER (OUTPATIENT)
Age: 87
Setting detail: OBSERVATION
Discharge: HOME HEALTH | End: 2022-01-19
Attending: INTERNAL MEDICINE | Admitting: INTERNAL MEDICINE
Payer: MEDICARE

## 2022-01-18 DIAGNOSIS — N18.4 STAGE 4 CHRONIC KIDNEY DISEASE (HCC): ICD-10-CM

## 2022-01-18 DIAGNOSIS — R77.8 ELEVATED TROPONIN: Primary | ICD-10-CM

## 2022-01-18 DIAGNOSIS — E87.5 HYPERKALEMIA: ICD-10-CM

## 2022-01-18 PROBLEM — R55 SYNCOPE AND COLLAPSE: Status: ACTIVE | Noted: 2022-01-18

## 2022-01-18 LAB
ALBUMIN SERPL-MCNC: 3.4 G/DL (ref 3.5–4.6)
ALP BLD-CCNC: 127 U/L (ref 35–104)
ALT SERPL-CCNC: 9 U/L (ref 0–41)
ANION GAP SERPL CALCULATED.3IONS-SCNC: 12 MEQ/L (ref 9–15)
APTT: 27.5 SEC (ref 24.4–36.8)
AST SERPL-CCNC: 15 U/L (ref 0–40)
BASOPHILS ABSOLUTE: 0.1 K/UL (ref 0–0.2)
BASOPHILS RELATIVE PERCENT: 0.5 %
BILIRUB SERPL-MCNC: 0.3 MG/DL (ref 0.2–0.7)
BUN BLDV-MCNC: 70 MG/DL (ref 8–23)
CALCIUM SERPL-MCNC: 9.6 MG/DL (ref 8.5–9.9)
CHLORIDE BLD-SCNC: 106 MEQ/L (ref 95–107)
CO2: 12 MEQ/L (ref 20–31)
CREAT SERPL-MCNC: 3.06 MG/DL (ref 0.7–1.2)
EKG ATRIAL RATE: 71 BPM
EKG P AXIS: 74 DEGREES
EKG P-R INTERVAL: 156 MS
EKG Q-T INTERVAL: 424 MS
EKG QRS DURATION: 134 MS
EKG QTC CALCULATION (BAZETT): 460 MS
EKG R AXIS: 39 DEGREES
EKG T AXIS: 49 DEGREES
EKG VENTRICULAR RATE: 71 BPM
EOSINOPHILS ABSOLUTE: 0.1 K/UL (ref 0–0.7)
EOSINOPHILS RELATIVE PERCENT: 0.6 %
GFR AFRICAN AMERICAN: 23.4
GFR NON-AFRICAN AMERICAN: 19.3
GLOBULIN: 5.6 G/DL (ref 2.3–3.5)
GLUCOSE BLD-MCNC: 151 MG/DL (ref 60–115)
GLUCOSE BLD-MCNC: 178 MG/DL (ref 70–99)
GLUCOSE BLD-MCNC: 220 MG/DL (ref 60–115)
HCT VFR BLD CALC: 27 % (ref 42–52)
HEMOGLOBIN: 8.8 G/DL (ref 14–18)
INR BLD: 1.1
LYMPHOCYTES ABSOLUTE: 1.6 K/UL (ref 1–4.8)
LYMPHOCYTES RELATIVE PERCENT: 12.7 %
MCH RBC QN AUTO: 30.4 PG (ref 27–31.3)
MCHC RBC AUTO-ENTMCNC: 32.6 % (ref 33–37)
MCV RBC AUTO: 93.2 FL (ref 80–100)
MONOCYTES ABSOLUTE: 1.3 K/UL (ref 0.2–0.8)
MONOCYTES RELATIVE PERCENT: 9.9 %
NEUTROPHILS ABSOLUTE: 9.7 K/UL (ref 1.4–6.5)
NEUTROPHILS RELATIVE PERCENT: 76.3 %
PDW BLD-RTO: 14.6 % (ref 11.5–14.5)
PERFORMED ON: ABNORMAL
PERFORMED ON: ABNORMAL
PLATELET # BLD: 415 K/UL (ref 130–400)
POTASSIUM SERPL-SCNC: 5.5 MEQ/L (ref 3.4–4.9)
PROTHROMBIN TIME: 14.5 SEC (ref 12.3–14.9)
RBC # BLD: 2.9 M/UL (ref 4.7–6.1)
SODIUM BLD-SCNC: 130 MEQ/L (ref 135–144)
TOTAL CK: 101 U/L (ref 0–190)
TOTAL PROTEIN: 9 G/DL (ref 6.3–8)
TROPONIN: 0.06 NG/ML (ref 0–0.01)
TROPONIN: 0.07 NG/ML (ref 0–0.01)
WBC # BLD: 12.7 K/UL (ref 4.8–10.8)

## 2022-01-18 PROCEDURE — G0378 HOSPITAL OBSERVATION PER HR: HCPCS

## 2022-01-18 PROCEDURE — 99285 EMERGENCY DEPT VISIT HI MDM: CPT

## 2022-01-18 PROCEDURE — 6360000002 HC RX W HCPCS: Performed by: NURSE PRACTITIONER

## 2022-01-18 PROCEDURE — 93005 ELECTROCARDIOGRAM TRACING: CPT | Performed by: PHYSICIAN ASSISTANT

## 2022-01-18 PROCEDURE — APPSS30 APP SPLIT SHARED TIME 16-30 MINUTES: Performed by: NURSE PRACTITIONER

## 2022-01-18 PROCEDURE — 6370000000 HC RX 637 (ALT 250 FOR IP): Performed by: PHYSICIAN ASSISTANT

## 2022-01-18 PROCEDURE — 97162 PT EVAL MOD COMPLEX 30 MIN: CPT

## 2022-01-18 PROCEDURE — 99283 EMERGENCY DEPT VISIT LOW MDM: CPT | Performed by: STUDENT IN AN ORGANIZED HEALTH CARE EDUCATION/TRAINING PROGRAM

## 2022-01-18 PROCEDURE — 82550 ASSAY OF CK (CPK): CPT

## 2022-01-18 PROCEDURE — 6370000000 HC RX 637 (ALT 250 FOR IP): Performed by: INTERNAL MEDICINE

## 2022-01-18 PROCEDURE — 71250 CT THORAX DX C-: CPT

## 2022-01-18 PROCEDURE — 84484 ASSAY OF TROPONIN QUANT: CPT

## 2022-01-18 PROCEDURE — 74176 CT ABD & PELVIS W/O CONTRAST: CPT

## 2022-01-18 PROCEDURE — 85730 THROMBOPLASTIN TIME PARTIAL: CPT

## 2022-01-18 PROCEDURE — 51798 US URINE CAPACITY MEASURE: CPT

## 2022-01-18 PROCEDURE — 72125 CT NECK SPINE W/O DYE: CPT

## 2022-01-18 PROCEDURE — 73030 X-RAY EXAM OF SHOULDER: CPT

## 2022-01-18 PROCEDURE — 97166 OT EVAL MOD COMPLEX 45 MIN: CPT

## 2022-01-18 PROCEDURE — 6830039000 HC L3 TRAUMA ALERT

## 2022-01-18 PROCEDURE — 70450 CT HEAD/BRAIN W/O DYE: CPT

## 2022-01-18 PROCEDURE — 80053 COMPREHEN METABOLIC PANEL: CPT

## 2022-01-18 PROCEDURE — 2580000003 HC RX 258: Performed by: NURSE PRACTITIONER

## 2022-01-18 PROCEDURE — 85025 COMPLETE CBC W/AUTO DIFF WBC: CPT

## 2022-01-18 PROCEDURE — 36415 COLL VENOUS BLD VENIPUNCTURE: CPT

## 2022-01-18 PROCEDURE — 99205 OFFICE O/P NEW HI 60 MIN: CPT | Performed by: INTERNAL MEDICINE

## 2022-01-18 PROCEDURE — 96376 TX/PRO/DX INJ SAME DRUG ADON: CPT

## 2022-01-18 PROCEDURE — 85610 PROTHROMBIN TIME: CPT

## 2022-01-18 PROCEDURE — 93010 ELECTROCARDIOGRAM REPORT: CPT | Performed by: INTERNAL MEDICINE

## 2022-01-18 PROCEDURE — 96361 HYDRATE IV INFUSION ADD-ON: CPT

## 2022-01-18 PROCEDURE — 96374 THER/PROPH/DIAG INJ IV PUSH: CPT

## 2022-01-18 PROCEDURE — 2580000003 HC RX 258: Performed by: INTERNAL MEDICINE

## 2022-01-18 RX ORDER — ASPIRIN 81 MG/1
81 TABLET, CHEWABLE ORAL DAILY
Status: DISCONTINUED | OUTPATIENT
Start: 2022-01-19 | End: 2022-01-19 | Stop reason: HOSPADM

## 2022-01-18 RX ORDER — NICOTINE POLACRILEX 4 MG
15 LOZENGE BUCCAL PRN
Status: DISCONTINUED | OUTPATIENT
Start: 2022-01-18 | End: 2022-01-19 | Stop reason: HOSPADM

## 2022-01-18 RX ORDER — SODIUM CHLORIDE 9 MG/ML
INJECTION, SOLUTION INTRAVENOUS
Status: DISPENSED
Start: 2022-01-18 | End: 2022-01-19

## 2022-01-18 RX ORDER — DEXTROSE MONOHYDRATE 25 G/50ML
12.5 INJECTION, SOLUTION INTRAVENOUS PRN
Status: DISCONTINUED | OUTPATIENT
Start: 2022-01-18 | End: 2022-01-19 | Stop reason: HOSPADM

## 2022-01-18 RX ORDER — ACETAMINOPHEN 325 MG/1
650 TABLET ORAL EVERY 6 HOURS PRN
Status: DISCONTINUED | OUTPATIENT
Start: 2022-01-18 | End: 2022-01-19 | Stop reason: HOSPADM

## 2022-01-18 RX ORDER — SODIUM BICARBONATE 650 MG/1
650 TABLET ORAL 2 TIMES DAILY
Status: DISCONTINUED | OUTPATIENT
Start: 2022-01-18 | End: 2022-01-19 | Stop reason: HOSPADM

## 2022-01-18 RX ORDER — ONDANSETRON 2 MG/ML
4 INJECTION INTRAMUSCULAR; INTRAVENOUS EVERY 6 HOURS PRN
Status: DISCONTINUED | OUTPATIENT
Start: 2022-01-18 | End: 2022-01-19 | Stop reason: HOSPADM

## 2022-01-18 RX ORDER — ACETAMINOPHEN 650 MG/1
650 SUPPOSITORY RECTAL EVERY 6 HOURS PRN
Status: DISCONTINUED | OUTPATIENT
Start: 2022-01-18 | End: 2022-01-19 | Stop reason: HOSPADM

## 2022-01-18 RX ORDER — DEXTROSE MONOHYDRATE 50 MG/ML
100 INJECTION, SOLUTION INTRAVENOUS PRN
Status: DISCONTINUED | OUTPATIENT
Start: 2022-01-18 | End: 2022-01-19 | Stop reason: HOSPADM

## 2022-01-18 RX ORDER — ASPIRIN 81 MG/1
324 TABLET, CHEWABLE ORAL ONCE
Status: COMPLETED | OUTPATIENT
Start: 2022-01-18 | End: 2022-01-18

## 2022-01-18 RX ORDER — POLYETHYLENE GLYCOL 3350 17 G/17G
17 POWDER, FOR SOLUTION ORAL DAILY PRN
Status: DISCONTINUED | OUTPATIENT
Start: 2022-01-18 | End: 2022-01-19 | Stop reason: HOSPADM

## 2022-01-18 RX ORDER — SODIUM POLYSTYRENE SULFONATE 15 G/60ML
30 SUSPENSION ORAL; RECTAL ONCE
Status: COMPLETED | OUTPATIENT
Start: 2022-01-18 | End: 2022-01-18

## 2022-01-18 RX ORDER — 0.9 % SODIUM CHLORIDE 0.9 %
1000 INTRAVENOUS SOLUTION INTRAVENOUS ONCE
Status: COMPLETED | OUTPATIENT
Start: 2022-01-18 | End: 2022-01-18

## 2022-01-18 RX ORDER — SENNA PLUS 8.6 MG/1
1 TABLET ORAL DAILY PRN
Status: DISCONTINUED | OUTPATIENT
Start: 2022-01-18 | End: 2022-01-19 | Stop reason: HOSPADM

## 2022-01-18 RX ORDER — SODIUM CHLORIDE 9 MG/ML
25 INJECTION, SOLUTION INTRAVENOUS PRN
Status: DISCONTINUED | OUTPATIENT
Start: 2022-01-18 | End: 2022-01-19 | Stop reason: HOSPADM

## 2022-01-18 RX ORDER — ONDANSETRON 4 MG/1
4 TABLET, ORALLY DISINTEGRATING ORAL EVERY 8 HOURS PRN
Status: DISCONTINUED | OUTPATIENT
Start: 2022-01-18 | End: 2022-01-19 | Stop reason: HOSPADM

## 2022-01-18 RX ORDER — SODIUM CHLORIDE 0.9 % (FLUSH) 0.9 %
5-40 SYRINGE (ML) INJECTION PRN
Status: DISCONTINUED | OUTPATIENT
Start: 2022-01-18 | End: 2022-01-19 | Stop reason: HOSPADM

## 2022-01-18 RX ORDER — SODIUM CHLORIDE 0.9 % (FLUSH) 0.9 %
5-40 SYRINGE (ML) INJECTION EVERY 12 HOURS SCHEDULED
Status: DISCONTINUED | OUTPATIENT
Start: 2022-01-18 | End: 2022-01-19 | Stop reason: HOSPADM

## 2022-01-18 RX ORDER — HEPARIN SODIUM 5000 [USP'U]/ML
5000 INJECTION, SOLUTION INTRAVENOUS; SUBCUTANEOUS EVERY 8 HOURS SCHEDULED
Status: DISCONTINUED | OUTPATIENT
Start: 2022-01-18 | End: 2022-01-19 | Stop reason: HOSPADM

## 2022-01-18 RX ADMIN — SODIUM POLYSTYRENE SULFONATE 30 G: 15 SUSPENSION ORAL; RECTAL at 12:47

## 2022-01-18 RX ADMIN — HEPARIN SODIUM 5000 UNITS: 5000 INJECTION, SOLUTION INTRAVENOUS; SUBCUTANEOUS at 12:48

## 2022-01-18 RX ADMIN — SODIUM BICARBONATE 650 MG: 650 TABLET ORAL at 21:13

## 2022-01-18 RX ADMIN — NITROGLYCERIN 1 INCH: 20 OINTMENT TOPICAL at 10:01

## 2022-01-18 RX ADMIN — HEPARIN SODIUM 5000 UNITS: 5000 INJECTION, SOLUTION INTRAVENOUS; SUBCUTANEOUS at 22:50

## 2022-01-18 RX ADMIN — SODIUM BICARBONATE 650 MG: 650 TABLET ORAL at 13:47

## 2022-01-18 RX ADMIN — Medication 20 ML: at 15:42

## 2022-01-18 RX ADMIN — ASPIRIN 324 MG: 81 TABLET, CHEWABLE ORAL at 10:01

## 2022-01-18 RX ADMIN — SODIUM ZIRCONIUM CYCLOSILICATE 10 G: 10 POWDER, FOR SUSPENSION ORAL at 21:13

## 2022-01-18 RX ADMIN — SODIUM CHLORIDE 1000 ML: 9 INJECTION, SOLUTION INTRAVENOUS at 12:45

## 2022-01-18 RX ADMIN — Medication 10 ML: at 21:13

## 2022-01-18 ASSESSMENT — ENCOUNTER SYMPTOMS
TROUBLE SWALLOWING: 0
WHEEZING: 0
ABDOMINAL PAIN: 0
VOMITING: 0
CONSTIPATION: 0
ABDOMINAL DISTENTION: 0
SHORTNESS OF BREATH: 0
NAUSEA: 0
SORE THROAT: 0
RHINORRHEA: 0
EYE DISCHARGE: 0
COUGH: 0
COLOR CHANGE: 0
DIARRHEA: 0
BACK PAIN: 0

## 2022-01-18 ASSESSMENT — PAIN DESCRIPTION - FREQUENCY: FREQUENCY: CONTINUOUS

## 2022-01-18 ASSESSMENT — PAIN DESCRIPTION - DESCRIPTORS: DESCRIPTORS: ACHING

## 2022-01-18 ASSESSMENT — PAIN SCALES - GENERAL
PAINLEVEL_OUTOF10: 7
PAINLEVEL_OUTOF10: 0

## 2022-01-18 ASSESSMENT — PAIN DESCRIPTION - ORIENTATION: ORIENTATION: LEFT

## 2022-01-18 ASSESSMENT — PAIN DESCRIPTION - LOCATION: LOCATION: RIB CAGE;SHOULDER

## 2022-01-18 ASSESSMENT — PAIN DESCRIPTION - PAIN TYPE: TYPE: ACUTE PAIN

## 2022-01-18 ASSESSMENT — HEART SCORE: ECG: 0

## 2022-01-18 NOTE — ED NOTES
Evelyne Cogan not given, due to admin of 30g kaexalate at [address] Felix Jacobson RN  01/18/22 [address] Russ Nielsen RN  01/18/22 [id number]

## 2022-01-18 NOTE — CONSULTS
Renal consult    CKD-4  BPH  Chronic hydro noted 12/22/2020  Bladder Overfill  Anemia  Metabolic acidosis     OHDX  Hypertension  DM type-2  Orthostatic    Plan straight cath  Start flomax  Fluids IV started  Bmp tonight and AM

## 2022-01-18 NOTE — ED NOTES
Perfect serve sent to hospitalist with request for urology consult   Unable to straight cath pt. Per wife pt has history of difficulty with catheter placement. This RN and Ismael Esquivel attempted with strait cath kit, 14f 12f and 10f. Resistance met within first 2-3cm of advancing cathter. Bolus Stopped pending further orders.       Geoffrey Dyer RN  01/18/22 [address] Florse RN  01/18/22 [address] Flores RN  01/18/22 [id number]

## 2022-01-18 NOTE — CONSULTS
Urology  Full consult to follow  Patient evaluated for the same issue in 2019  History of high residuals/chronic hydro/GFR stable compared to 2019  Currently voiding(incontinent)  No indication for Lopez at this time  Will reevaluate in the morning  Previously managed with condom cath  Recommend applying condom cath  Mercedez Whitmore MD

## 2022-01-18 NOTE — ED NOTES
This RN spoke to Dr Angelika Rendon regarding pt status. Per dr Angelika Rendon, pt will be seen as soon as possible.   Per Dr Angelika Rendon, patient does not need figueroa catheter at this time     Kelby Pineda RN  01/18/22 [id number]

## 2022-01-18 NOTE — ED NOTES
Pt soiled brief removed.    Moderate amount of urine noted  Per wife patient placed in dry fried at about [address] Chastity Gamble, RN  01/18/22 [address] [facility]  01/18/22 [id number]

## 2022-01-18 NOTE — PROGRESS NOTES
[facility] OCCUPATIONAL THERAPY EVALUATION - ACUTE     NAME: Robinson Loyd  : 1932 (80 y.o.)  MRN: [id number]  CODE STATUS: DNR-CCA  Room:     Date of Service: 2022    Patient Diagnosis(es): Syncope and collapse [R55]   Chief Complaint   Patient presents with        pt fell getting of the cough this AM hitting the coffee table , c/o lef side rb and left shoulder pain     Patient Active Problem List    Diagnosis Date Noted    Syncope and collapse 2022    Hydronephrosis     General weakness 2020    Chronic kidney disease, stage IV (severe) ([address] 2020    Anemia of chronic renal failure 2020    Ataxic gait     Dementia due to Alzheimer's disease ([address]     Bradycardia 10/03/2019    Fall at home 10/03/2019    Hyponatremia 10/03/2019    Hyperkalemia 10/03/2019    Hypotension 10/03/2019    Paroxysmal A-fib (HCC)     Hypertension     Anemia in CKD (chronic kidney disease)     Gram negative sepsis (HCC)     Klebsiella pneumoniae sepsis ([address]     Uncontrolled type 2 diabetes mellitus with hyperglycemia ([address]     Pneumonia 2019        Past Medical History:   Diagnosis Date    Acute metabolic encephalopathy     Anemia in CKD (chronic kidney disease)     Blind right eye     Chronic hyponatremia     CKD (chronic kidney disease) stage 4, GFR 15-29 ml/min (HCC)     Diabetes mellitus ([address]     Hypertension     Kidney stone     Klebsiella pneumoniae sepsis (HCC)     Mixed hyperlipidemia     Obstructive uropathy     Paroxysmal A-fib (HCC)     Pneumonia     Sacral ulcer ([address]     Urethral stricture     UTI due to Klebsiella species      Past Surgical History:   Procedure Laterality Date    EYE SURGERY      KIDNEY STONE SURGERY          Restrictions  Restrictions/Precautions: Up [address]     Safety Devices: Safety Devices  Safety Devices in place: Yes  Type of devices:  All fall risk precautions in place      Subjective  Pre Treatment Pain Screening  Pain at present: 0  Scale Used: Numeric Score  Intervention List: Patient able to continue with treatment,Patient declined any intervention    Pain Reassessment:   Pain Assessment  Patient Currently in Pain: Denies  Pain Assessment: 0-10  Pain Level: 0     Prior Level of Function:  Social/Functional History  Lives With: Spouse  Type of Home: House  Home Layout: Two level,Bed/Bath upstairs,1/2 bath on main level  Home Access: Stairs to enter without rails  Entrance Stairs - Number of Steps: 1  Bathroom Shower/Tub: Tub/Shower unit  Bathroom Equipment: Shower chair,Grab bars in shower,Hand-held shower  ADL Assistance: [address] [place] [address] Avenue:  (Spouse is primary)  Ambulation Assistance: Independent  Transfer Assistance: Independent  Active : No  Patient's  Info: Call family to assist  Occupation: Retired    OBJECTIVE:     Orientation Status:  Orientation  Overall Orientation Status: Within Functional Limits    Observation:  Observation/Palpation  Posture: Good  Observation: Pt alert and attentive, pleasant, no acute distress. BP initially 133/71. With sitting dropped to 107/51, unable to achieve reading in standing    Cognition Status:  Cognition  Overall Cognitive Status: Exceptions  Arousal/Alertness: Appropriate responses to stimuli  Following Commands:  Follows one step commands consistently,Follows multistep commands with increased time  Attention Span: Difficulty dividing attention,Difficulty attending to directions  Memory: Decreased recall of recent events,Decreased recall of precautions  Safety Judgement: Decreased awareness of need for safety,Decreased awareness of need for assistance  Problem Solving: Assistance required to generate solutions,Assistance required to correct errors made,Assistance required to implement solutions,Assistance required to identify errors made  Insights: Decreased awareness of deficits  Initiation: Requires cues for some  Sequencing: Requires cues for some  Cognition Comment: Verbal cues for sequencing and safety    Perception Status:  Perception  Overall Perceptual Status: WFL    Sensation Status:  Sensation  Overall Sensation Status: WFL    Vision and Hearing Status:  Vision  Vision: Impaired (R eye artificial)  Hearing  Hearing: Exceptions to [facility]  Hearing Exceptions: Hard of hearing/hearing concerns     ROM:   LUE AROM (degrees)  LUE AROM : WFL  Left Hand AROM (degrees)  Left Hand AROM: WFL  RUE AROM (degrees)  RUE AROM : WFL  Right Hand AROM (degrees)  Right Hand AROM: WFL    Strength:  LUE Strength  Gross LUE Strength: Exceptions to [facility]  L Hand General: 3+/5  LUE Strength Comment: 3+/5 all planes  RUE Strength  Gross RUE Strength: Exceptions to [facility]  R Hand General: 3+/5  RUE Strength Comment: 3+/5 all planes    Coordination, Tone, Quality of Movement: Tone RUE  RUE Tone: Normotonic  Tone LUE  LUE Tone: Normotonic  Coordination  Movements Are Fluid And Coordinated: No  Coordination and Movement description: Fine motor impairments,Decreased speed,Decreased accuracy,Left UE,Right UE    Hand Dominance:  Hand Dominance  Hand Dominance: Right    ADL Status:  ADL  Feeding: Setup  Grooming: Setup  UE Bathing: Setup  LE Bathing: Moderate assistance  UE Dressing: Setup  LE Dressing: Moderate assistance  Toileting: Moderate assistance  Additional Comments: Simulated ADLs as above.  Limited d/t blood pressure and fatigue  Toilet Transfers  Toilet Transfer: Unable to assess  Toilet Transfers Comments: Anticipate min A     Therapy key for assistance levels -   Independent = Pt. is able to perform task with no assistance but may require a device   Stand by assistance = Pt. does not perform task at an independent level but does not need physical assistance, requires verbal cues  Minimal, Moderate, Maximal Assistance = Pt. requires physical assistance (25%, 50%, 75% assist from helper) for task but is able to actively participate in task   Dependent = Pt. requires total assistance with task and is not able to actively participate with task completion     Functional Mobility:  Functional Mobility  Functional - Mobility Device: No device (HHA)  Activity: Other  Assist Level: Minimal assistance  Functional Mobility Comments: Min A to side step to [facility]  Transfers  Stand to sit: Minimal assistance    Bed Mobility  Bed mobility  Supine to Sit: Moderate assistance  Sit to Supine: Minimal assistance,Contact guard assistance  Comment: Increased time to complete; significant assist to lift trunk from bed surface d/t pt c/o stiffness    Seated and Standing Balance:  Balance  Sitting Balance: Supervision  Standing Balance: Minimal assistance    Functional Endurance:  Activity Tolerance  Activity Tolerance: Patient Tolerated treatment well    D/C Recommendations:  OT D/C RECOMMENDATIONS  REQUIRES OT FOLLOW UP: Yes    Equipment Recommendations:  OT Equipment Recommendations  Other: Continue to assess    OT Education:   OT Education  OT Education: [facility]  Patient Education: Educated pt. on role of acute care OT  Barriers to Learning: None    OT Follow Up:  OT D/C RECOMMENDATIONS  REQUIRES OT FOLLOW UP: Yes     Assessment/Discharge Disposition:  Assessment: Pt is an 80year old man from home who presents to [facility] with the above deficits which impact his ability to perform ADLs and IADLs. Pt. limited d/t fatigue and weakness. Pt. would benefit from continued OT to maximize independence and safety with ADL tasks.   Performance deficits / Impairments: Decreased functional mobility ,Decreased ADL status,Decreased strength,Decreased endurance,Decreased balance,Decreased high-level IADLs,Decreased fine motor control,Decreased coordination,Decreased cognition,Decreased safe awareness  Prognosis: Good  Discharge Recommendations: Continue to assess pending progress  Decision Making: Low Complexity  History: Pt's medical history is moderately complex  Exam: Pt. has 10 performance

## 2022-01-18 NOTE — ED PROVIDER NOTES
James Mendes  eMERGENCY dEPARTMENT eNCOUnter      Pt Name: Lincoln Price  MRN: [id number]  [place] 2/28/1932  Date of evaluation: 1/18/2022  Provider: Tony Rizvi PA-C    CHIEF COMPLAINT       Chief Complaint   Patient presents with    Fall     pt fell getting of the cough this AM hitting the coffee table , c/o lef side rb and left shoulder pain         HISTORY OF PRESENT ILLNESS   (Location/Symptom, Timing/Onset,Context/Setting, Quality, Duration, Modifying Factors, Severity)  Note limiting factors. Lincoln Price is a 80 y.o. male who presents to the emergency department complaint of left shoulder pain, and left-sided rib, upper abdominal pain secondary to fall which patient states occurred last evening. Patient states that he shoveled snow yesterday, he states he was feeling weak this morning, he went up to go to use the bathroom, and fell forward striking his shoulder, and left ribs up against a coffee table, he states he also did fall to the floor striking his head. He denies any loss of consciousness, no numbness or tingling, no nausea vomiting or dizziness. Patient rates his current pain at this time is a 5 out of 10. Past medical history significant for metabolic encephalopathy, anemia, chronic hyponatremia, chronic kidney disease, diabetes, hypertension, hyperlipidemia, atrial fibrillation. HPI    NursingNotes were reviewed. REVIEW OF SYSTEMS    (2-9 systems for level 4, 10 or more for level 5)     Review of Systems   Constitutional: Negative for activity change and appetite change. HENT: Negative for congestion, ear discharge, ear pain, nosebleeds, rhinorrhea and sore throat. Eyes: Negative for discharge. Respiratory: Negative for shortness of breath. Left-sided chest wall pain   Cardiovascular: Negative for chest pain, palpitations and leg swelling. Gastrointestinal: Negative for abdominal distention, abdominal pain and constipation.         Left upper quadrant abdominal pain   Genitourinary: Negative for difficulty urinating and dysuria. Musculoskeletal: Negative for arthralgias, back pain and neck pain. Left shoulder pain   Skin: Negative for color change, pallor, rash and wound. Neurological: Negative for dizziness, tremors, syncope, weakness, numbness and headaches. Psychiatric/Behavioral: Negative for agitation and confusion. Except as noted above the remainder of the review of systems was reviewed and negative.        PAST MEDICAL HISTORY     Past Medical History:   Diagnosis Date    Acute metabolic encephalopathy     Anemia in CKD (chronic kidney disease)     Blind right eye     Chronic hyponatremia     CKD (chronic kidney disease) stage 4, GFR 15-29 ml/min ([facility])     Diabetes mellitus [facility]     Hypertension     Kidney stone     Klebsiella pneumoniae sepsis ([facility])     Mixed hyperlipidemia     Obstructive uropathy     Paroxysmal A-fib ([facility])     Pneumonia     Sacral ulcer ([facility]     Urethral stricture     UTI due to Klebsiella species          SURGICALHISTORY       Past Surgical History:   Procedure Laterality Date   [address] MEDICATIONS       Discharge Medication List as of 1/19/2022  4:56 PM      CONTINUE these medications which have NOT CHANGED    Details   sodium bicarbonate 650 MG tablet Take 2 tablets by mouth 3 times daily, Disp-90 tablet, R-3NO PRINT      alfuzosin (UROXATRAL) 10 MG extended release tablet Take 1 tablet by mouth daily (with breakfast), Disp-30 tablet, R-3Normal      aspirin 81 MG tablet Take 81 mg by mouth dailyHistorical Med      vitamin B-12 (CYANOCOBALAMIN) 500 MCG tablet Take 500 mcg by mouth dailyHistorical Med      insulin glargine (LANTUS SOLOSTAR) 100 UNIT/ML injection pen Inject 10 Units into the skin nightly, Disp-5 pen, R-3Normal      insulin lispro (HUMALOG KWIKPEN) 100 UNIT/ML pen Glucose 100-120 no insulin, 121-140 2 units, 141-160 4 units, 161- 180 6 units, 181-200 8 units, 200 or higher 10 units, Disp-5 pen, R-3Normal             ALLERGIES     Pcn [penicillins] and Hctz [hydrochlorothiazide]    FAMILY HISTORY     History reviewed. No pertinent family history. SOCIAL HISTORY       Social History     Socioeconomic History    Marital status:      Spouse name: None    Number of children: None    Years of education: None    Highest education level: None   Occupational History    None   Tobacco Use    Smoking status: Former Smoker    Smokeless tobacco: Never Used   Vaping Use    Vaping Use: Never used   Substance and Sexual Activity    Alcohol use: Not Currently    Drug use: Never    Sexual activity: None   Other Topics Concern    None   Social History Narrative    None     Social Determinants of Health     Financial Resource Strain:     Difficulty of Paying Living Expenses: Not on file   Food Insecurity:     Worried About Running Out of Food in the Last Year: Not on file    Arthur of Food in the Last Year: Not on file   Transportation Needs:     Lack of Transportation (Medical): Not on file    Lack of Transportation (Non-Medical):  Not on file   Physical Activity:     Days of Exercise per Week: Not on file    Minutes of Exercise per Session: Not on file   Stress:     Feeling of Stress : Not on file   Social Connections:     Frequency of Communication with Friends and Family: Not on file    Frequency of Social Gatherings with Friends and Family: Not on file    Attends [employer] Services: Not on file    Active Member of Clubs or Organizations: Not on file    Attends Club or Organization Meetings: Not on file    Marital Status: Not on file   Intimate Partner Violence:     Fear of Current or Ex-Partner: Not on file    Emotionally Abused: Not on file    Physically Abused: Not on file    Sexually Abused: Not on file   Housing Stability:     Unable to Pay for Housing in the Last Year: Not on file    Number of [facility] in the Last Year: Not on file    Unstable Housing in the Last Year: Not on file       SCREENINGS    [place] Coma Scale  Eye Opening: Spontaneous  Best Verbal Response: Confused  Best Motor Response: Obeys commands  All Coma Scale Score: 14 @FLOW([id number])@      PHYSICAL EXAM    (up to 7 for level 4, 8 or more for level 5)     ED Triage Vitals   BP Temp Temp src Pulse Resp SpO2 Height Weight   -- -- -- -- -- -- -- --       Physical Exam  Vitals and nursing note reviewed. Constitutional:       General: He is not in acute distress. Appearance: He is well-developed. He is not ill-appearing, toxic-appearing or diaphoretic. HENT:      Head: Normocephalic. Comments: Patient has no signs of traumatic injury, no cut scrapes abrasions, no depressions, no deformity, no pain on palpation. To head scalp or face. Right Ear: Tympanic membrane normal.      Left Ear: Tympanic membrane normal.      Nose: Nose normal. No congestion. Mouth/Throat:      Mouth: Mucous membranes are moist.      Pharynx: No oropharyngeal exudate or posterior oropharyngeal erythema. Eyes:      Extraocular Movements: Extraocular movements intact. Conjunctiva/sclera: Conjunctivae normal.      Pupils: Pupils are equal, round, and reactive to light. Neck:      Vascular: No JVD. Trachea: No tracheal deviation. Cardiovascular:      Rate and Rhythm: Normal rate. Pulses: Normal pulses. Heart sounds: Normal heart sounds. No murmur heard. No friction rub. No gallop. Pulmonary:      Effort: Pulmonary effort is normal. No tachypnea, accessory muscle usage, respiratory distress or retractions. Breath sounds: Normal breath sounds. No stridor. No wheezing, rhonchi or rales. Comments: Lung sounds are clear in all fields, no wheezes rales or rhonchi, no excess muscle use, no retractions, no cut scrapes abrasions are noted to chest wall, no paradoxical movement or flail segments.   Chest:      Chest wall: No tenderness. Abdominal:      General: Abdomen is flat. Bowel sounds are normal. There is no distension or abdominal bruit. Palpations: There is no shifting dullness, fluid wave, hepatomegaly, splenomegaly, mass or pulsatile mass. Tenderness: There is no abdominal tenderness. There is no right CVA tenderness, left CVA tenderness, guarding or rebound. Negative signs include Batres's sign, Rovsing's sign and McBurney's sign. Comments: Abdomen soft nondistended nontender no guarding mass rebound, no CVA tenderness. Prescription abrasions are noted no hematomas   Musculoskeletal:         General: No deformity. Arms:       Cervical back: Normal range of motion and neck supple. No rigidity. Comments: Patient is moving all extremities well, no pain on palpation cervical spine, thoracic pain, lumbar spine, sacral area is stable, there is no pelvic instability, no or crepitus. No shortening or rotation of bilateral lower extremities, no femoral pain, no knee tib-fib foot or ankle pain bilaterally, no pain across shoulders, no visible signs of injuries, no cut scrapes abrasions or bruising. No crepitus or instability, no humeral pain, no elbow pain, ulna radius, wrist hand finger pain, patient moves upper and lower extremities well without any increasing pain or facial grimace, upper and lower extremities are neurovascular intact. Skin:     General: Skin is warm and dry. Capillary Refill: Capillary refill takes less than 2 seconds. Coloration: Skin is not jaundiced. Neurological:      General: No focal deficit present. Mental Status: He is alert and oriented to person, place, and time. Mental status is at baseline. Cranial Nerves: No cranial nerve deficit. Sensory: No sensory deficit. Motor: No weakness.       Coordination: Coordination normal.   Psychiatric:         Mood and Affect: Mood normal.         DIAGNOSTIC RESULTS     EKG: All EKG's are interpreted by the Emergency Department Physician who either signs or Co-signsthis chart in the absence of a cardiologist.    EKG shows normal sinus rhythm at 71 bpm there is a right bundle branch block. No acute ST segment abnormality no ventricular ectopy  ms    RADIOLOGY:   Non-plain filmimages such as CT, Ultrasound and MRI are read by the radiologist. Plain radiographic images are visualized and preliminarily interpreted by the emergency physician with the below findings:        Interpretation per the Radiologist below, if available at the time ofthis note:    XR SHOULDER LEFT (MIN 2 VIEWS)   Final Result      There are no acute osseous injuries. CT Head WO Contrast   Final Result      There are chronic involutional atrophic changes usually associated with microangiopathy, similar to the prior study. There are no acute changes. CT CERVICAL SPINE WO CONTRAST   Final Result   No acute cervical spine abnormality. CT CHEST WO CONTRAST   Final Result   NEGATIVE CT OF THE CHEST. EXAMINATION: CT CHEST WO CONTRAST, CT ABDOMEN PELVIS WO CONTRAST       DATE AND TIME:1/18/2022 9:01 AM      CLINICAL HISTORY: Acute abdominal pain. Epigastric pain. fall left side memo wall pain        COMPARISON: None available. TECHNIQUE: Contiguous axial CT sections of the abdomen and pelvis. No IV contrast administered. Unenhanced imaging is limited for the evaluation of some intra-abdominal and pelvic pathologies. All CT scans at this facility use dose modulation,    iterative reconstruction, and/or weight based dosing when appropriate to reduce radiation dose to as low as reasonably achievable. Some of this report was completed using  [id number] [id number] and may include unintended errors    with respect to translation of words, typographical errors or grammatical errors which may not have been identified prior to the finalization of this report.          FINDINGS Liver: Negative          Spleen: Negative         Gallbladder: No calcified gallstones. Normal gallbladder wall. No pericholecystic fluid. Pancreas: Negative         Kidney: Bilateral severe hydroureteronephrosis. No obstructing stone. Adrenal glands are negative. Bowel: Negative  No CT evidence of appendicitis        Nodes: No lymphadenopathy. Aorta: Negative          Pelvis: Moderately distended bladder with slightly lobulated bladder wall. Assessment of bladder wall mucosa limited on these noncontrast scans. No abnormal soft tissue mass        Peritoneum: No free fluid or free air. The abdominal wall is intact. Bones: No acute osseous abnormalities. Other:None               IMPRESSION: SEVERE BILATERAL HYDROURETERONEPHROSIS. NO OBSTRUCTING STONE. BLADDER DETAIL LIMITED ON THESE NONCONTRAST SCANS. CT ABDOMEN PELVIS WO CONTRAST Additional Contrast? None   Final Result   NEGATIVE CT OF THE CHEST. EXAMINATION: CT CHEST WO CONTRAST, CT ABDOMEN PELVIS WO CONTRAST       DATE AND TIME:1/18/2022 9:01 AM      CLINICAL HISTORY: Acute abdominal pain. Epigastric pain. fall left side memo wall pain        COMPARISON: None available. TECHNIQUE: Contiguous axial CT sections of the abdomen and pelvis. No IV contrast administered. Unenhanced imaging is limited for the evaluation of some intra-abdominal and pelvic pathologies. All CT scans at this facility use dose modulation,    iterative reconstruction, and/or weight based dosing when appropriate to reduce radiation dose to as low as reasonably achievable. Some of this report was completed using  [employer] WDXCAZAILE and may include unintended errors    with respect to translation of words, typographical errors or grammatical errors which may not have been identified prior to the finalization of this report.          FINDINGS        Liver: Negative          Spleen: Negative Gallbladder: No calcified gallstones. Normal gallbladder wall. No pericholecystic fluid. Pancreas: Negative         Kidney: Bilateral severe hydroureteronephrosis. No obstructing stone. Adrenal glands are negative. Bowel: Negative  No CT evidence of appendicitis        Nodes: No lymphadenopathy. Aorta: Negative          Pelvis: Moderately distended bladder with slightly lobulated bladder wall. Assessment of bladder wall mucosa limited on these noncontrast scans. No abnormal soft tissue mass        Peritoneum: No free fluid or free air. The abdominal wall is intact. Bones: No acute osseous abnormalities. Other:None               IMPRESSION: SEVERE BILATERAL HYDROURETERONEPHROSIS. NO OBSTRUCTING STONE. BLADDER DETAIL LIMITED ON THESE NONCONTRAST SCANS.                      ED BEDSIDE ULTRASOUND:   Performed by ED Physician - none    LABS:  Labs Reviewed   COMPREHENSIVE METABOLIC PANEL - Abnormal; Notable for the following components:       Result Value    Sodium 130 (*)     Potassium 5.5 (*)     CO2 12 (*)     Glucose 178 (*)     BUN 70 (*)     CREATININE 3.06 (*)     GFR Non- 19.3 (*)     GFR  23.4 (*)     Total Protein 9.0 (*)     Albumin 3.4 (*)     Alkaline Phosphatase 127 (*)     Globulin 5.6 (*)     All other components within normal limits    Narrative:     CALL  [facility] tel. [phone],  Troponin results called to and read back by Marium Eastman, 01/18/2022 09:21, by  Triny Banks   CBC WITH AUTO DIFFERENTIAL - Abnormal; Notable for the following components:    WBC 12.7 (*)     RBC 2.90 (*)     Hemoglobin 8.8 (*)     Hematocrit 27.0 (*)     MCHC 32.6 (*)     RDW 14.6 (*)     Platelets 174 (*)     Neutrophils Absolute 9.7 (*)     Monocytes Absolute 1.3 (*)     All other components within normal limits   TROPONIN - Abnormal; Notable for the following components:    Troponin 0.064 (*)     All other components within normal limits    Narrative: [phone],  Troponin results called to and read back by Deborah Hanna, 01/18/2022 09:21, by  EVANS   TROPONIN - Abnormal; Notable for the following components:    Troponin 0.066 (*)     All other components within normal limits    Narrative:     [phone],  Troponin results called to and read back by Kari Putnam, 01/18/2022 12:55,  by  [facility] W/ REFLEX TO MG FOR LOW K - Abnormal; Notable for the following components:    Chloride 110 (*)     CO2 14 (*)     Glucose 147 (*)     BUN 68 (*)     CREATININE 3.17 (*)     GFR Non- 18.5 (*)     GFR  22.4 (*)     All other components within normal limits   CBC WITH AUTO DIFFERENTIAL - Abnormal; Notable for the following components:    RBC 2.93 (*)     Hemoglobin 9.0 (*)     Hematocrit 27.7 (*)     MCHC 32.4 (*)     Platelets 858 (*)     Neutrophils Absolute 6.9 (*)     Monocytes Absolute 1.2 (*)     All other components within normal limits   IRON AND TIBC - Abnormal; Notable for the following components:    Iron 22 (*)     TIBC 167 (*)     Iron Saturation 13 (*)     All other components within normal limits   TROPONIN - Abnormal; Notable for the following components:    Troponin 0.063 (*)     All other components within normal limits    Narrative:     CALL  [place] [address] tel. [phone],  Troponin results called to and read back by Mario GRACE RN, 01/19/2022  13:45, by VIVI   POCT GLUCOSE - Abnormal; Notable for the following components:    POC Glucose 151 (*)     All other components within normal limits   POCT GLUCOSE - Abnormal; Notable for the following components:    POC Glucose 220 (*)     All other components within normal limits   POCT VENOUS - Abnormal; Notable for the following components:    POC Creatinine 3.3 (*)     GFR Non- 18 (*)     GFR  21 (*)     All other components within normal limits   POCT GLUCOSE - Abnormal; Notable for the following components:    POC Glucose 116 (*)     All other components within normal limits   POCT GLUCOSE - Abnormal; Notable for the following components:    POC Glucose 257 (*)     All other components within normal limits   POCT GLUCOSE - Abnormal; Notable for the following components:    POC Glucose 153 (*)     All other components within normal limits   CK    Narrative:     CALL  [facility] tel. [phone],  Troponin results called to and read back by Dereje Whitehead, 01/18/2022 09:21, by  Chandan Sanchez   PROTIME-INR   APTT   VITAMIN B12 & FOLATE   URINE RT REFLEX TO CULTURE   POCT GLUCOSE   POCT GLUCOSE   POCT GLUCOSE   POCT GLUCOSE   POCT GLUCOSE       All other labs were within normal range or not returned as of this dictation. EMERGENCY DEPARTMENT COURSE and DIFFERENTIAL DIAGNOSIS/MDM:   Vitals:    Vitals:    01/18/22 2146 01/19/22 1526 01/19/22 1529 01/19/22 1530   BP: (!) 148/59      Pulse: 80 86 93 95   Resp: 18      Temp: 98.2 °F (36.8 °C)      TempSrc: Oral      SpO2: 97%      Weight:       Height:              MDM  Number of Diagnoses or Management Options  Elevated troponin  Hyperkalemia  Stage 4 chronic kidney disease (HCC)  Diagnosis management comments: Patient was seen by myself as well as by trauma services. Patient presents emerged department complaint of generalized weakness, and a fall which occurred this morning. Patient states yesterday he was shoveling snow, he states that he started feeling weak and fatigued following his shoveling, he states that he was restless all evening and slept on the couch to to keep from waking up his wife. He states he got up to use the bathroom, he felt dizzy, states he fell forward striking his chest, and right shoulder against a coffee table. Denies any chest pain or shortness of breath.   He does have past history of chronic kidney disease, and had been followed by Dr. Vikram García in the past, but wife states that he does not follow with Dr. Vikram García any longer, and all his care is through the [facility].  Patient states that he does not have a cardiologist locally. Patient is fairly comfortable, he denies any chest pain, he was seen by myself as well as trauma services, all his scans are negative at this time for acute bony injury, negative acute intracranial process. EKG shows no specific abnormalities, but patient does have an elevated troponin of 0.064. Though he does have a history of chronic kidney disease. To higher suspicion of cardiac event due to patient shoveling snow which caused the onset of his symptoms, patient will be admitted to the hospital for further evaluation management of elevated cardiac enzymes, concerns for acute coronary syndrome. CRITICAL CARE TIME   Total Critical Care time was 0 minutes, excluding separately reportableprocedures. There was a high probability of clinicallysignificant/life threatening deterioration in the patient's condition which required my urgent intervention. CONSULTS:  IP CONSULT TO CARDIOLOGY  IP CONSULT TO UROLOGY  IP CONSULT TO HOME CARE NEEDS    PROCEDURES:  Unless otherwise noted below, none     Procedures    FINAL IMPRESSION      1. Elevated troponin    2. Stage 4 chronic kidney disease ([address]    3.  Hyperkalemia          DISPOSITION/PLAN   DISPOSITION Admitted 01/18/2022 11:00:33 AM      PATIENT REFERRED TO:  [place] [address]  [phone]    On 1/27/2022  AT [address]  [phone]            DISCHARGE MEDICATIONS:  Discharge Medication List as of 1/19/2022  4:56 PM             (Please note that portions of this note were completed with a voice recognition program.  Efforts were made to edit the dictations but occasionally words are mis-transcribed.)    Johnny Powell PA-C (electronically signed)  Attending Emergency Physician         Johnny Powell PA-C  01/21/22 [place] [id number]

## 2022-01-18 NOTE — CONSULTS
Consult Note  Patient: Sandra Hirsch  Unit/Bed: 81/57  YOB: 1932  MRN: [id number]  Acct: [de-identified]   Admitting Diagnosis: Syncope and collapse [R55]  Date:  1/18/2022  Hospital Day: 0      Chief Complaint: Fall    History of Present Illness: Sandra Hirsch is a 80 y.o., , male who has PMH CKD IV, chronic hyponatremia, DM type II, HTN, obstructive uropathy, PAF who presented to ED s/p fall from standing at home. Pt reports getting up from couch to go to the bathroom and fell, hitting his head on the coffee table. Unknown LOC. Pt c/o left shoulder and left chest wall pain which is worsened with deep breaths and movement. Work up in the ED: CT head negative for acute changes, CT chest negative, CT abd with severe bilateral hydrouretonephrosis, xray of left shoulder negative, EKG shows NSR with chronic R BBB. Labs significant for: , K 5.5, BUN 70, Cr 3.06, GFR 19.3, Troponin 0.064, WBC 12.7, Hgb 8.8, hct 27.0. Pt's last echo was in 10/2019 and showed: EF 65%  Pt's wife states she has never been told that pt has afib or heart rhythm abnormalities. Pt only on ASA at home. Pt monitored on tele, currently NSR. Pt denies CP, SOB, palpitations.      Allergies   Allergen Reactions    Pcn [Penicillins]     Hctz [Hydrochlorothiazide] Rash       Current Facility-Administered Medications   Medication Dose Route Frequency Provider Last Rate Last Admin    sodium chloride flush 0.9 % injection 5-40 mL  5-40 mL IntraVENous 2 times per day Jamie Rockwell, APRN - NP        sodium chloride flush 0.9 % injection 5-40 mL  5-40 mL IntraVENous PRN Jamie Rockwell, APRN - NP        0.9 % sodium chloride infusion  25 mL IntraVENous PRN Jamie Rockwell, APRN - NP        ondansetron (ZOFRAN-ODT) disintegrating tablet 4 mg  4 mg Oral Q8H PRN Jamie Rockwell, APRN - NP        Or    ondansetron (ZOFRAN) injection 4 mg  4 mg IntraVENous Q6H PRN Jamie Rockwell, APRN - NP        polyethylene glycol (GLYCOLAX) packet 17 g  17 g Oral Daily PRN BETTIE King NP        acetaminophen (TYLENOL) tablet 650 mg  650 mg Oral Q6H PRN BETTIE King NP        Or   [employer] acetaminophen (TYLENOL) suppository 650 mg  650 mg Rectal Q6H PRN BETTIE King - NP        senna (SENOKOT) tablet 8.6 mg  1 tablet Oral Daily PRN BETTIE King NP        insulin lispro (HUMALOG) injection vial 0-12 Units  0-12 Units SubCUTAneous TID WC BETTIE King NP   2 Units at 01/18/22 1247    insulin lispro (HUMALOG) injection vial 0-6 Units  0-6 Units SubCUTAneous Nightly BETTIE King NP        glucose (GLUTOSE) 40 % oral gel 15 g  15 g Oral PRN BETTIE King NP        dextrose 50 % IV solution  12.5 g IntraVENous PRN BETTIE King NP        glucagon (rDNA) injection 1 mg  1 mg IntraMUSCular PRN BETTIE King NP        dextrose 5 % solution  100 mL/hr IntraVENous PRN BETTIE King NP       [employer] Emma Grams ON 1/19/2022] aspirin chewable tablet 81 mg  81 mg Oral Daily BETTIE King NP        heparin (porcine) injection 5,000 Units  5,000 Units SubCUTAneous 3 times per day BETTIE King NP   5,000 Units at 01/18/22 1248    0.9 % sodium chloride bolus  1,000 mL IntraVENous Once Dawna Joseph,  mL/hr at 01/18/22 1245 1,000 mL at 01/18/22 1245    sodium chloride 0.9 % infusion              Current Outpatient Medications   Medication Sig Dispense Refill    sodium bicarbonate 650 MG tablet Take 2 tablets by mouth 3 times daily 90 tablet 3    alfuzosin (UROXATRAL) 10 MG extended release tablet Take 1 tablet by mouth daily (with breakfast) 30 tablet 3    aspirin 81 MG tablet Take 81 mg by mouth daily      vitamin B-12 (CYANOCOBALAMIN) 500 MCG tablet Take 500 mcg by mouth daily      insulin glargine (LANTUS SOLOSTAR) 100 UNIT/ML injection pen Inject 10 Units into the skin nightly 5 pen 3    insulin lispro (HUMALOG KWIKPEN) 100 UNIT/ML pen Glucose 100-120 no insulin, 121-140 2 units, 141-160 4 units, 161- 180 6 units, 181-200 8 units, 200 or higher 10 units 5 pen 3       PMHx:  Past Medical History:   Diagnosis Date    Acute metabolic encephalopathy     Anemia in CKD (chronic kidney disease)     Blind right eye     Chronic hyponatremia     CKD (chronic kidney disease) stage 4, GFR 15-29 ml/min ([facility])     Diabetes mellitus ([address]     Hypertension     Kidney stone     Klebsiella pneumoniae sepsis ([facility])     Mixed hyperlipidemia     Obstructive uropathy     Paroxysmal A-fib ([address]     Pneumonia     Sacral ulcer ([facility]     Urethral stricture     UTI due to Klebsiella species        PSHx:  Past Surgical History:   Procedure Laterality Date    EYE SURGERY      KIDNEY STONE SURGERY  1995       Social Hx:  Social History     Socioeconomic History    Marital status:      Spouse name: None    Number of children: None    Years of education: None    Highest education level: None   Occupational History    None   Tobacco Use    Smoking status: Former Smoker    Smokeless tobacco: Never Used   Vaping Use    Vaping Use: Never used   Substance and Sexual Activity    Alcohol use: Not Currently    Drug use: Never    Sexual activity: None   Other Topics Concern    None   Social History Narrative    None     Social Determinants of Health     Financial Resource Strain:     Difficulty of Paying Living Expenses: Not on file   Food Insecurity:     Worried About Running Out of Food in the Last Year: Not on file    [place] of Food in the Last Year: Not on file   Transportation Needs:     Lack of Transportation (Medical): Not on file    Lack of Transportation (Non-Medical):  Not on file   Physical Activity:     Days of Exercise per Week: Not on file    Minutes of Exercise per Session: Not on file   Stress:     Feeling of Stress : Not on file   Social Connections:     Frequency of Communication with Friends and Family: Not on file    Frequency of Social Gatherings with Friends and Family: Not on file    Attends Oriental orthodox Services: Not on file    Active Member of Clubs or Organizations: Not on file    Attends Club or Organization Meetings: Not on file    Marital Status: Not on file   Intimate Partner Violence:     Fear of Current or Ex-Partner: Not on file    Emotionally Abused: Not on file    Physically Abused: Not on file    Sexually Abused: Not on file   Housing Stability:     Unable to Pay for Housing in the Last Year: Not on file    Number of [place] in the Last Year: Not on file    Unstable Housing in the Last Year: Not on file       Family Hx:  History reviewed. No pertinent family history. Review of Systems:   Review of Systems   Constitutional: Negative for chills, diaphoresis and fever. HENT: Negative for congestion, rhinorrhea and trouble swallowing. Eyes: Negative for visual disturbance. Respiratory: Negative for cough, shortness of breath and wheezing. Cardiovascular: Negative for chest pain, palpitations and leg swelling. Gastrointestinal: Negative for abdominal distention, abdominal pain, constipation, diarrhea, nausea and vomiting. Endocrine: Negative. Genitourinary: Negative for difficulty urinating, dysuria, frequency and urgency. Musculoskeletal: Negative for back pain and gait problem. Skin: Negative for wound. Neurological: Positive for syncope. Negative for dizziness, seizures, speech difficulty, weakness, numbness and headaches. Hematological: Does not bruise/bleed easily. Psychiatric/Behavioral: Negative. Physical Examination:    BP (!) 108/56   Pulse 69   Temp 97.9 °F (36.6 °C) (Oral)   Resp 16   Wt 175 lb (79.4 kg)   SpO2 98%   BMI 25.11 kg/m²    Physical Exam  Vitals and nursing note reviewed. Constitutional:       General: He is not in acute distress. HENT:      Head: Normocephalic.       Nose: Nose normal.      Mouth/Throat:      Mouth: Mucous membranes are moist.   Eyes:      Pupils: Pupils are equal, round, and reactive to light. Neck:      Vascular: No carotid bruit. Cardiovascular:      Rate and Rhythm: Normal rate and regular rhythm. Pulses: Normal pulses. Heart sounds: Normal heart sounds. No murmur heard. No friction rub. No gallop. Pulmonary:      Effort: Pulmonary effort is normal. No respiratory distress. Breath sounds: Normal breath sounds. No stridor. No wheezing, rhonchi or rales. Chest:      Chest wall: No tenderness. Abdominal:      General: Abdomen is flat. Palpations: Abdomen is soft. Musculoskeletal:         General: Normal range of motion. Cervical back: Normal range of motion. Right lower leg: No edema. Left lower leg: No edema. Skin:     General: Skin is warm and dry. Capillary Refill: Capillary refill takes less than 2 seconds. Neurological:      Mental Status: He is alert and oriented to person, place, and time. Mental status is at baseline.    Psychiatric:         Mood and Affect: Mood normal.         Behavior: Behavior normal.         LABS:  CBC:  Lab Results   Component Value Date    WBC 12.7 01/18/2022    RBC 2.90 01/18/2022    HGB 8.8 01/18/2022    HCT 27.0 01/18/2022    MCV 93.2 01/18/2022    MCH 30.4 01/18/2022    MCHC 32.6 01/18/2022    RDW 14.6 01/18/2022     01/18/2022    MPV 7.0 01/08/2021     CBC with Differential:   Lab Results   Component Value Date    WBC 12.7 01/18/2022    RBC 2.90 01/18/2022    HGB 8.8 01/18/2022    HCT 27.0 01/18/2022     01/18/2022    MCV 93.2 01/18/2022    MCH 30.4 01/18/2022    MCHC 32.6 01/18/2022    RDW 14.6 01/18/2022    BANDSPCT 9 12/20/2020    LYMPHOPCT 12.7 01/18/2022    MONOPCT 9.9 01/18/2022    BASOPCT 0.5 01/18/2022    MONOSABS 1.3 01/18/2022    LYMPHSABS 1.6 01/18/2022    EOSABS 0.1 01/18/2022    BASOSABS 0.1 01/18/2022     CMP:    Lab Results   Component Value Date     01/18/2022    K 5.5 01/18/2022    K 4.0 12/23/2020     01/18/2022    CO2 12 01/18/2022    BUN 70 01/18/2022    CREATININE 3.06 01/18/2022    GFRAA 23.4 01/18/2022    LABGLOM 19.3 01/18/2022    GLUCOSE 178 01/18/2022    PROT 9.0 01/18/2022    LABALBU 3.4 01/18/2022    CALCIUM 9.6 01/18/2022    BILITOT 0.3 01/18/2022    ALKPHOS 127 01/18/2022    AST 15 01/18/2022    ALT 9 01/18/2022     BMP:    Lab Results   Component Value Date     01/18/2022    K 5.5 01/18/2022    K 4.0 12/23/2020     01/18/2022    CO2 12 01/18/2022    BUN 70 01/18/2022    LABALBU 3.4 01/18/2022    CREATININE 3.06 01/18/2022    CALCIUM 9.6 01/18/2022    GFRAA 23.4 01/18/2022    LABGLOM 19.3 01/18/2022    GLUCOSE 178 01/18/2022     Magnesium:    Lab Results   Component Value Date    MG 1.6 07/25/2019     Troponin:    Lab Results   Component Value Date    TROPONINI 0.064 01/18/2022       Radiology:  CT ABDOMEN PELVIS WO CONTRAST Additional Contrast? None    Result Date: 1/18/2022  EXAMINATION: CT CHEST WO CONTRAST, CT ABDOMEN PELVIS WO CONTRAST DATE:1/18/2022 9:01 AM CLINICAL HISTORY: Anterior chest pain. Shortness of breath. fall left side memo wall pain  COMPARISON:  None TECHNIQUE: Helical CT was performed through the chest without IV contrast., All CT scans at this facility use dose modulation, iterative reconstruction, and/or weight based dosing when appropriate to reduce radiation dose to as low as reasonably achievable. Some of this report was completed using Power Scribe [id number] [id number] and may include unintended errors with respect to translation of words, typographical errors or grammatical errors which may not have been identified prior to the finalization of this report. FINDINGS:   Lungs: Small patchy reticular opacities at the lung bases probably dependent changes. Small nonspecific patch of groundglass opacity left midlung zone possibly inflammatory.   Pleura:Normal. No effusion or thickening  Mediastinum :Mediastinum and alban are unremarkable. Vessels:Thoracic aorta is intact. Bones:No acute osseous abnormalities. Other:None     NEGATIVE CT OF THE CHEST. EXAMINATION: CT CHEST WO CONTRAST, CT ABDOMEN PELVIS WO CONTRAST DATE AND TIME:1/18/2022 9:01 AM CLINICAL HISTORY: Acute abdominal pain. Epigastric pain. fall left side memo wall pain  COMPARISON: None available. TECHNIQUE: Contiguous axial CT sections of the abdomen and pelvis. No IV contrast administered. Unenhanced imaging is limited for the evaluation of some intra-abdominal and pelvic pathologies. All CT scans at this facility use dose modulation, iterative reconstruction, and/or weight based dosing when appropriate to reduce radiation dose to as low as reasonably achievable. Some of this report was completed using  Power Scribe 282 [id number] and may include unintended errors with respect to translation of words, typographical errors or grammatical errors which may not have been identified prior to the finalization of this report. FINDINGS   Liver: Negative    Spleen: Negative   Gallbladder: No calcified gallstones. Normal gallbladder wall. No pericholecystic fluid. Pancreas: Negative   Kidney: Bilateral severe hydroureteronephrosis. No obstructing stone. Adrenal glands are negative. Bowel: Negative  No CT evidence of appendicitis   Nodes: No lymphadenopathy. Aorta: Negative    Pelvis: Moderately distended bladder with slightly lobulated bladder wall. Assessment of bladder wall mucosa limited on these noncontrast scans. No abnormal soft tissue mass   Peritoneum: No free fluid or free air. The abdominal wall is intact. Bones: No acute osseous abnormalities. Other:None IMPRESSION: SEVERE BILATERAL HYDROURETERONEPHROSIS. NO OBSTRUCTING STONE. BLADDER DETAIL LIMITED ON THESE NONCONTRAST SCANS.      CT Head WO Contrast    Result Date: 1/18/2022  EXAMINATION: CT HEAD WO CONTRAST, 1/18/2022 9:01 AM CLINICAL HISTORY:  fall COMPARISON: Brain CT from voice-recognition technology and may include unintended errors with respect to translation of words, typographical errors or grammatical errors which may not have been identified prior to the finalization of this report. FINDINGS:   Lungs: Small patchy reticular opacities at the lung bases probably dependent changes. Small nonspecific patch of groundglass opacity left midlung zone possibly inflammatory. Pleura:Normal. No effusion or thickening  Mediastinum :Mediastinum and alban are unremarkable. Vessels:Thoracic aorta is intact. Bones:No acute osseous abnormalities. Other:None     NEGATIVE CT OF THE CHEST. EXAMINATION: CT CHEST WO CONTRAST, CT ABDOMEN PELVIS WO CONTRAST DATE AND TIME:1/18/2022 9:01 AM CLINICAL HISTORY: Acute abdominal pain. Epigastric pain. fall left side memo wall pain  COMPARISON: None available. TECHNIQUE: Contiguous axial CT sections of the abdomen and pelvis. No IV contrast administered. Unenhanced imaging is limited for the evaluation of some intra-abdominal and pelvic pathologies. All CT scans at this facility use dose modulation, iterative reconstruction, and/or weight based dosing when appropriate to reduce radiation dose to as low as reasonably achievable. Some of this report was completed using  Power Scribe 867 [id number] and may include unintended errors with respect to translation of words, typographical errors or grammatical errors which may not have been identified prior to the finalization of this report. FINDINGS   Liver: Negative    Spleen: Negative   Gallbladder: No calcified gallstones. Normal gallbladder wall. No pericholecystic fluid. Pancreas: Negative   Kidney: Bilateral severe hydroureteronephrosis. No obstructing stone. Adrenal glands are negative. Bowel: Negative  No CT evidence of appendicitis   Nodes: No lymphadenopathy. Aorta: Negative    Pelvis: Moderately distended bladder with slightly lobulated bladder wall.  Assessment of bladder wall mucosa limited on these noncontrast scans. No abnormal soft tissue mass   Peritoneum: No free fluid or free air. The abdominal wall is intact. Bones: No acute osseous abnormalities. Other:None IMPRESSION: SEVERE BILATERAL HYDROURETERONEPHROSIS. NO OBSTRUCTING STONE. BLADDER DETAIL LIMITED ON THESE NONCONTRAST SCANS. CT CERVICAL SPINE WO CONTRAST    Result Date: 1/18/2022  EXAMINATION: CT CERVICAL SPINE WO CONTRAST   DATE AND TIME:1/18/2022 9:01 AM CLINICAL HISTORY:Acute posterior neck pain  fall  COMPARISON: October 3, 9628 TECHNIQUE:Helical scanning was performed from the skull base through the remainder of the cervical spine without intravenous contrast. Sagittal and coronal reformats were obtained. The lack of contrast limits CT sensitivity of the soft tissues. All CT scans at this facility use dose modulation, iterative reconstruction, and/or weight based dosing when appropriate to reduce radiation dose to as low as reasonably achievable. FINDINGS   Fracture:No acute fracture. Alignment:  Normal cervical lordosis. No subluxation. No canal stenosis. Dense atlas:Dens atlas relationship is normal. Soft tissues:Negative. Other findings: There is moderate cervical spondylosis with multiple level disc osteophyte changes. Severe canal stenosis at C5-6. No acute cervical spine abnormality. XR SHOULDER LEFT (MIN 2 VIEWS)    Result Date: 1/18/2022  EXAMINATION: XR SHOULDER LEFT (MIN 2 VIEWS) CLINICAL HISTORY:  fall COMPARISONS: None available. Technique: 3 views LEFT SHOULDER X-RAY FINDINGS: There are no lytic or sclerotic bone lesions. The humeral head is located. There is no fracture or subluxation. The visualized portions of the lung and chest wall are within normal limits. There are no radiopaque foreign bodies. There are no acute osseous injuries.         EKG: NSR, chronic R BBB      Assessment:    Active Hospital Problems    Diagnosis Date Noted    Syncope and collapse [R55] 01/18/2022     Priority: Low     Elevated troponin - likely due to CKD  Hyperkalemia - treated in ED with Kayexelate  Orthostatic hypotension   hx PAF  Hx CKD IV  Hx Alzheimer's dementia  Hx DM II  Normal LVF - last echo in 2019  Hx htn - BP stable    Plan:  1. As always, aggressive risk factor modification is strongly recommended. We should adhere to the JNC VIII guidelines for HTN management and the NCEPATP III guidelines for LDL-C management. 2. Monitor on telemetry  3. Maximize cardiac medications - continue ASA  4. Check 2D Echo for LV function, PA pressures, wall motion abnormalities and any significant valvular disease. 5. GI/DVT prophylaxis  6. Maintain potassium greater than 4, magnesium greater than 2  7. Further recommendations to follow      Electronically signed by BETTIE Rendon CNP on 1/18/2022 at 12:51 PM      Attending Supervising [de-identified] Attestation Statement  The patient is a 80 y.o. male. I have performed a history and physical examination of the patient. I discussed the case with the nurse practitioner. I reviewed the patient's Past Medical History, Past Surgical History, Medications, and Allergies.      Physical Exam:  Vitals:    01/18/22 1224 01/18/22 1305 01/18/22 1330 01/18/22 1800   BP:   121/62 127/61   Pulse:   73 71   Resp: 16  17 17   Temp:       TempSrc:       SpO2: 98%  98% 98%   Weight:       Height:  5' 11\" (1.803 m)         Review of Systems - Respiratory ROS: no cough, shortness of breath, or wheezing  Cardiovascular ROS: no chest pain or dyspnea on exertion  Gastrointestinal ROS: no abdominal pain, change in bowel habits, or black or bloody stools    Pulmonary/Chest: clear to auscultation bilaterally- no wheezes, rales or rhonchi, normal air movement, no respiratory distress  Cardiovascular: normal rate, normal S1 and S2, no gallops, intact distal pulses and no carotid bruits  Abdomen: soft, non-tender, non-distended, normal bowel sounds, no masses or organomegaly    Active Hospital Problems    Diagnosis Date Noted    Syncope and collapse [R55] 01/18/2022     Priority: Low        I reviewed and agree with the findings and plan documented in her note . Impression    Syncope- likely orthostatic in nature, rule out malignant arrhythmia/cardiac ischemia/valvular heart disease/etc.     Elevated trops, likely demand ischemia due to renal failure. No angina. + orthostatics    ? Hx of Pafib    CKD    Chronic hydronephrosis    DM    HTN    Hyperkalemia    anemia    Normal LVF      Plan     1. Check 2D Echo for LV function, PA pressures, wall motion abnormalities and any significant valvular disease. 2. Monitor on tele  3. Agree with IVF  4. Recheck orthostatic tomorrow. 5. Max cardiac meds  6. Conservative med rx given advanced age, co morbidities, dementia  7. Nephro/Urology recs  8. Avoid nephrotoxic agents  9. Gi/DVT proph  10. Further recs to follow. Thank you for allowing me to participate in the care of your patient, please don't hesitate to contact me if you have any further questions.       Electronically signed by Lesley Mcleod DO on 1/18/22 at 8:07 PM EST

## 2022-01-18 NOTE — ED NOTES
Call to Urology for consult.  Spoke with Dr. Gunjan Wong office     [address] [facility]  01/18/22 0972

## 2022-01-18 NOTE — H&P
[place] [address] MEDICINE    HISTORY AND PHYSICAL EXAM    PATIENT NAME:  Pascale Paz    MRN:  [id number]  SERVICE DATE:  1/18/2022   SERVICE TIME:  10:59 AM    Primary Care Physician: Kiki Olmos DO     SUBJECTIVE  CHIEF COMPLAINT:  fall    HPI:  Pascale Paz is a 80 y.o., , male who has PMH CKD IV, chronic hyponatremia, DM type II, HTN, obstructive uropathy, PAF who presented initially as  CAT2 trauma. S/p fall from standing with head strike with possible LOC and is currently taking ASA. Imaging and labs completed in ED. Evaluated by trauma who signed off. Patient reported to trauma fall occurred today however upon my exam patient states he fell last evening. Reports standing from the cough to the bathroom, held on to objects and fell onto coffee table. Wife reports weakness over the past 3 days No fevers or chills. No CP, N/V/D. Ambulates with walker. Has chronic urinary incontinence and wears depends. CTH, chest and C-spine completed. No acute processes. Given kayexelate for hyperkalemia. Does have severe bilaterally hydroureteronephrosis which is present on previous scans. PAST MEDICAL HISTORY:    Past Medical History:   Diagnosis Date    Acute metabolic encephalopathy     Anemia in CKD (chronic kidney disease)     Blind right eye     Chronic hyponatremia     CKD (chronic kidney disease) stage 4, GFR 15-29 ml/min ([facility])     Diabetes mellitus ([address]     Hypertension     Kidney stone     Klebsiella pneumoniae sepsis ([facility])     Mixed hyperlipidemia     Obstructive uropathy     Paroxysmal A-fib ([facility])     Pneumonia     Sacral ulcer ([address]     Urethral stricture     UTI due to Klebsiella species      PAST SURGICAL HISTORY:    Past Surgical History:   Procedure Laterality Date    EYE SURGERY      KIDNEY STONE SURGERY  1995     FAMILY HISTORY:  History reviewed. No pertinent family history.   SOCIAL HISTORY:    Social History     Socioeconomic History    Marital status:  Spouse name: Not on file    Number of children: Not on file    Years of education: Not on file    Highest education level: Not on file   Occupational History    Not on file   Tobacco Use    Smoking status: Former Smoker    Smokeless tobacco: Never Used   Vaping Use    Vaping Use: Never used   Substance and Sexual Activity    Alcohol use: Not Currently    Drug use: Never    Sexual activity: Not on file   Other Topics Concern    Not on file   Social History Narrative    Not on file     Social Determinants of Health     Financial Resource Strain:     Difficulty of Paying Living Expenses: Not on file   Food Insecurity:     Worried About [address] in the Last Year: Not on file    [address] in the Last Year: Not on file   Transportation Needs:     Lack of Transportation (Medical): Not on file    Lack of Transportation (Non-Medical): Not on file   Physical Activity:     Days of Exercise per Week: Not on file    Minutes of Exercise per Session: Not on file   Stress:     Feeling of Stress : Not on file   Social Connections:     Frequency of Communication with Friends and Family: Not on file    Frequency of Social Gatherings with Friends and Family: Not on file    Attends [employer] Services: Not on file    Active Member of [address] [place] [address] or Organizations: Not on file    Attends Club or Organization Meetings: Not on file    Marital Status: Not on file   Intimate Partner Violence:     Fear of Current or Ex-Partner: Not on file    Emotionally Abused: Not on file    Physically Abused: Not on file    Sexually Abused: Not on file   Housing Stability:     Unable to Pay for Housing in the Last Year: Not on file    Number of [place] in the Last Year: Not on file    Unstable Housing in the Last Year: Not on file     MEDICATIONS:   Prior to Admission medications    Medication Sig Start Date End Date Taking?  Authorizing Provider   sodium bicarbonate 650 MG tablet Take 2 tablets by mouth 3 times daily 12/23/20   Jessica Shah MD   alfuzosin (UROXATRAL) 10 MG extended release tablet Take 1 tablet by mouth daily (with breakfast) 12/24/20   Jessica Shah MD   aspirin 81 MG tablet Take 81 mg by mouth daily    Historical Provider, MD   vitamin B-12 (CYANOCOBALAMIN) 500 MCG tablet Take 500 mcg by mouth daily    Historical Provider, MD   insulin glargine (LANTUS SOLOSTAR) 100 UNIT/ML injection pen Inject 10 Units into the skin nightly 7/29/19   CHACHA Yeh MD   insulin lispro (HUMALOG KWIKPEN) 100 UNIT/ML pen Glucose 100-120 no insulin, 121-140 2 units, 141-160 4 units, 161- 180 6 units, 181-200 8 units, 200 or higher 10 units 7/29/19   CHACHA Yeh MD       ALLERGIES: Pcn [penicillins] and Hctz [hydrochlorothiazide]    REVIEW OF SYSTEM:   12 point ROS negative unless indicated below or in the HPI    OBJECTIVE  PHYSICAL EXAM:   Physical Exam  Constitutional:       Appearance: Normal appearance. HENT:      Head: Normocephalic and atraumatic. Right Ear: External ear normal.      Left Ear: External ear normal.      Mouth/Throat:      Mouth: Mucous membranes are dry. Eyes:      Comments: False eye R   Cardiovascular:      Rate and Rhythm: Normal rate and regular rhythm. Pulses: Normal pulses. Pulmonary:      Effort: Pulmonary effort is normal. No respiratory distress. Breath sounds: Normal breath sounds. No stridor. No wheezing, rhonchi or rales. Abdominal:      General: Bowel sounds are normal. There is no distension. Palpations: Abdomen is soft. Tenderness: There is no abdominal tenderness. Musculoskeletal:         General: Normal range of motion. Cervical back: Normal range of motion. Skin:     General: Skin is warm and dry. Capillary Refill: Capillary refill takes 2 to 3 seconds. Comments: Sacrum/buttocks red   Neurological:      Mental Status: He is alert and oriented to person, place, and time. Motor: Weakness present.       Comments: EXTREMELY [facility]:         Mood and Affect: Mood normal.          /62   Pulse 96   Temp 97.9 °F (36.6 °C) (Oral)   Resp 16   Wt 175 lb (79.4 kg)   SpO2 98%   BMI 25.11 kg/m²     DATA:     Diagnostic tests reviewed for today's visit:    Most recent labs and imaging results reviewed.      LABS:    Recent Results (from the past 24 hour(s))   Comprehensive Metabolic Panel    Collection Time: 01/18/22  8:30 AM   Result Value Ref Range    Sodium 130 (L) 135 - 144 mEq/L    Potassium 5.5 (H) 3.4 - 4.9 mEq/L    Chloride 106 95 - 107 mEq/L    CO2 12 (L) 20 - 31 mEq/L    Anion Gap 12 9 - 15 mEq/L    Glucose 178 (H) 70 - 99 mg/dL    BUN 70 (H) 8 - 23 mg/dL    CREATININE 3.06 (H) 0.70 - 1.20 mg/dL    GFR Non-African American 19.3 (L) >60    GFR  23.4 (L) >60    Calcium 9.6 8.5 - 9.9 mg/dL    Total Protein 9.0 (H) 6.3 - 8.0 g/dL    Albumin 3.4 (L) 3.5 - 4.6 g/dL    Total Bilirubin 0.3 0.2 - 0.7 mg/dL    Alkaline Phosphatase 127 (H) 35 - 104 U/L    ALT 9 0 - 41 U/L    AST 15 0 - 40 U/L    Globulin 5.6 (H) 2.3 - 3.5 g/dL   CBC Auto Differential    Collection Time: 01/18/22  8:30 AM   Result Value Ref Range    WBC 12.7 (H) 4.8 - 10.8 K/uL    RBC 2.90 (L) 4.70 - 6.10 M/uL    Hemoglobin 8.8 (L) 14.0 - 18.0 g/dL    Hematocrit 27.0 (L) 42.0 - 52.0 %    MCV 93.2 80.0 - 100.0 fL    MCH 30.4 27.0 - 31.3 pg    MCHC 32.6 (L) 33.0 - 37.0 %    RDW 14.6 (H) 11.5 - 14.5 %    Platelets 444 (H) 293 - 400 K/uL    Neutrophils % 76.3 %    Lymphocytes % 12.7 %    Monocytes % 9.9 %    Eosinophils % 0.6 %    Basophils % 0.5 %    Neutrophils Absolute 9.7 (H) 1.4 - 6.5 K/uL    Lymphocytes Absolute 1.6 1.0 - 4.8 K/uL    Monocytes Absolute 1.3 (H) 0.2 - 0.8 K/uL    Eosinophils Absolute 0.1 0.0 - 0.7 K/uL    Basophils Absolute 0.1 0.0 - 0.2 K/uL   Troponin    Collection Time: 01/18/22  8:30 AM   Result Value Ref Range    Troponin 0.064 (HH) 0.000 - 0.010 ng/mL   CK    Collection Time: 01/18/22  8:30 AM   Result Value Ref Range    Total  0 - 190 U/L   Protime-INR    Collection Time: 01/18/22  8:30 AM   Result Value Ref Range    Protime 14.5 12.3 - 14.9 sec    INR 1.1    APTT    Collection Time: 01/18/22  8:30 AM   Result Value Ref Range    aPTT 27.5 24.4 - 36.8 sec   EKG 12 Lead - Chest Pain    Collection Time: 01/18/22  8:44 AM   Result Value Ref Range    Ventricular Rate 71 BPM    Atrial Rate 71 BPM    P-R Interval 156 ms    QRS Duration 134 ms    Q-T Interval 424 ms    QTc Calculation (Bazett) 460 ms    P Axis 74 degrees    R Axis 39 degrees    T Axis 49 degrees       IMAGING:  CT ABDOMEN PELVIS WO CONTRAST Additional Contrast? None    Result Date: 1/18/2022  EXAMINATION: CT CHEST WO CONTRAST, CT ABDOMEN PELVIS WO CONTRAST DATE:1/18/2022 9:01 AM CLINICAL HISTORY: Anterior chest pain. Shortness of breath. fall left side memo wall pain  COMPARISON:  None TECHNIQUE: Helical CT was performed through the chest without IV contrast., All CT scans at this facility use dose modulation, iterative reconstruction, and/or weight based dosing when appropriate to reduce radiation dose to as low as reasonably achievable. Some of this report was completed using Power Scribe 423 [id number] and may include unintended errors with respect to translation of words, typographical errors or grammatical errors which may not have been identified prior to the finalization of this report. FINDINGS:   Lungs: Small patchy reticular opacities at the lung bases probably dependent changes. Small nonspecific patch of groundglass opacity left midlung zone possibly inflammatory. Pleura:Normal. No effusion or thickening  Mediastinum :Mediastinum and alban are unremarkable. Vessels:Thoracic aorta is intact. Bones:No acute osseous abnormalities. Other:None     NEGATIVE CT OF THE CHEST. EXAMINATION: CT CHEST WO CONTRAST, CT ABDOMEN PELVIS WO CONTRAST DATE AND TIME:1/18/2022 9:01 AM CLINICAL HISTORY: Acute abdominal pain. Epigastric pain.    fall left side memo wall pain  COMPARISON: None available. TECHNIQUE: Contiguous axial CT sections of the abdomen and pelvis. No IV contrast administered. Unenhanced imaging is limited for the evaluation of some intra-abdominal and pelvic pathologies. All CT scans at this facility use dose modulation, iterative reconstruction, and/or weight based dosing when appropriate to reduce radiation dose to as low as reasonably achievable. Some of this report was completed using  Power Scribe 600 [id number] and may include unintended errors with respect to translation of words, typographical errors or grammatical errors which may not have been identified prior to the finalization of this report. FINDINGS   Liver: Negative    Spleen: Negative   Gallbladder: No calcified gallstones. Normal gallbladder wall. No pericholecystic fluid. Pancreas: Negative   Kidney: Bilateral severe hydroureteronephrosis. No obstructing stone. Adrenal glands are negative. Bowel: Negative  No CT evidence of appendicitis   Nodes: No lymphadenopathy. Aorta: Negative    Pelvis: Moderately distended bladder with slightly lobulated bladder wall. Assessment of bladder wall mucosa limited on these noncontrast scans. No abnormal soft tissue mass   Peritoneum: No free fluid or free air. The abdominal wall is intact. Bones: No acute osseous abnormalities. Other:None IMPRESSION: SEVERE BILATERAL HYDROURETERONEPHROSIS. NO OBSTRUCTING STONE. BLADDER DETAIL LIMITED ON THESE NONCONTRAST SCANS. CT Head WO Contrast    Result Date: 1/18/2022  EXAMINATION: CT HEAD WO CONTRAST, 1/18/2022 9:01 AM CLINICAL HISTORY:  fall COMPARISON: Brain CT from October 3, 2019 TECHNIQUE:  Multiple contiguous axial images of the head were obtained from the skull base through the skull vertex without intravenous contrast. Sagittal and coronal reformats have been produced.  All CT scans at this facility use dose modulation, iterative reconstruction, and/or weight based dosing when appropriate to reduce radiation dose to as low as reasonably achievable. BRAIN CT FINDINGS: Gray-white matter differentiation is maintained. No acute hemorrhage, mass, mass effect, or midline shift. There is prominence of sulci and ventricles indicating mild global cerebral atrophy and chronic involutional changes. . There is a persistent area of encephalomalacia indicating prior chronic ischemia in the medial right frontal lobe. The subcortical and periventricular white matter is within normal limits. The basal ganglia are within normal limits. There are no acute changes or space-occupying lesions in the posterior fossa. The orbits are unchanged since the previous examination demonstrating a prosthetic on the right side and previous scleral banding on the left. There is mucoperiosteal thickening of the maxillary sinuses, left greater than right consistent chronic sinusitis. The calvarium is intact. There are chronic involutional atrophic changes usually associated with microangiopathy, similar to the prior study. There are no acute changes. CT CHEST WO CONTRAST    Result Date: 1/18/2022  EXAMINATION: CT CHEST WO CONTRAST, CT ABDOMEN PELVIS WO CONTRAST DATE:1/18/2022 9:01 AM CLINICAL HISTORY: Anterior chest pain. Shortness of breath. fall left side memo wall pain  COMPARISON:  None TECHNIQUE: Helical CT was performed through the chest without IV contrast., All CT scans at this facility use dose modulation, iterative reconstruction, and/or weight based dosing when appropriate to reduce radiation dose to as low as reasonably achievable. Some of this report was completed using Power Scribe 069 [id number] and may include unintended errors with respect to translation of words, typographical errors or grammatical errors which may not have been identified prior to the finalization of this report.  FINDINGS:   Lungs: Small patchy reticular opacities at the lung bases probably dependent changes. Small nonspecific patch of groundglass opacity left midlung zone possibly inflammatory. Pleura:Normal. No effusion or thickening  Mediastinum :Mediastinum and alban are unremarkable. Vessels:Thoracic aorta is intact. Bones:No acute osseous abnormalities. Other:None     NEGATIVE CT OF THE CHEST. EXAMINATION: CT CHEST WO CONTRAST, CT ABDOMEN PELVIS WO CONTRAST DATE AND TIME:1/18/2022 9:01 AM CLINICAL HISTORY: Acute abdominal pain. Epigastric pain. fall left side memo wall pain  COMPARISON: None available. TECHNIQUE: Contiguous axial CT sections of the abdomen and pelvis. No IV contrast administered. Unenhanced imaging is limited for the evaluation of some intra-abdominal and pelvic pathologies. All CT scans at this facility use dose modulation, iterative reconstruction, and/or weight based dosing when appropriate to reduce radiation dose to as low as reasonably achievable. Some of this report was completed using  Power [id number]-[id number] and may include unintended errors with respect to translation of words, typographical errors or grammatical errors which may not have been identified prior to the finalization of this report. FINDINGS   Liver: Negative    Spleen: Negative   Gallbladder: No calcified gallstones. Normal gallbladder wall. No pericholecystic fluid. Pancreas: Negative   Kidney: Bilateral severe hydroureteronephrosis. No obstructing stone. Adrenal glands are negative. Bowel: Negative  No CT evidence of appendicitis   Nodes: No lymphadenopathy. Aorta: Negative    Pelvis: Moderately distended bladder with slightly lobulated bladder wall. Assessment of bladder wall mucosa limited on these noncontrast scans. No abnormal soft tissue mass   Peritoneum: No free fluid or free air. The abdominal wall is intact. Bones: No acute osseous abnormalities. Other:None IMPRESSION: SEVERE BILATERAL HYDROURETERONEPHROSIS.  NO OBSTRUCTING STONE. BLADDER DETAIL LIMITED ON THESE NONCONTRAST SCANS. CT CERVICAL SPINE WO CONTRAST    Result Date: 1/18/2022  EXAMINATION: CT CERVICAL SPINE WO CONTRAST   DATE AND TIME:1/18/2022 9:01 AM CLINICAL HISTORY:Acute posterior neck pain  fall  COMPARISON: October 3, 0730 TECHNIQUE:Helical scanning was performed from the skull base through the remainder of the cervical spine without intravenous contrast. Sagittal and coronal reformats were obtained. The lack of contrast limits CT sensitivity of the soft tissues. All CT scans at this facility use dose modulation, iterative reconstruction, and/or weight based dosing when appropriate to reduce radiation dose to as low as reasonably achievable. FINDINGS   Fracture:No acute fracture. Alignment:  Normal cervical lordosis. No subluxation. No canal stenosis. Dense atlas:Dens atlas relationship is normal. Soft tissues:Negative. Other findings: There is moderate cervical spondylosis with multiple level disc osteophyte changes. Severe canal stenosis at C5-6. No acute cervical spine abnormality. XR SHOULDER LEFT (MIN 2 VIEWS)    Result Date: 1/18/2022  EXAMINATION: XR SHOULDER LEFT (MIN 2 VIEWS) CLINICAL HISTORY:  fall COMPARISONS: None available. Technique: 3 views LEFT SHOULDER X-RAY FINDINGS: There are no lytic or sclerotic bone lesions. The humeral head is located. There is no fracture or subluxation. The visualized portions of the lung and chest wall are within normal limits. There are no radiopaque foreign bodies. There are no acute osseous injuries. VTE Prophylaxis: heparin    ASSESSMENT AND PLAN    80 y.o. male with who has PMH CKD IV, chronic hyponatremia, DM type II, HTN, obstructive uropathy, PAF who presented initially as  CAT2 trauma. S/p fall from standing with head strike with possible LOC and is currently taking ASA. Seen by trauma who signed off.  Admitted with the following:    Syncope and collapse  S/p fall from standing  Weakness  -Cardio consulted, check orthostatic VS, telemetry. Neuro checks Q4hrs, Seizure precautions. Fall precautions.  PT consult    Elevated troponin  -Likely in the setting of CKD  -R/O ACS  -Asa daily  -Cycle troponins  -Telemetry monitoring    CKD IV  Chronic hyponatremia  -Hyperkalemic  -Received kayexelate  -BMP in am    Chronic urethral stricture  Severe BILATERAL HYDROURETERONEPHROSIS  -No acute changes  -Bladder scan    HTN  -BP stable  -Review home medications    DM type II  -POCT ACHS  -SSI, hypoglycemia protocol    Plan of care discussed with: patient and spouse    SIGNATURE: BETTIE Harry NP  DATE: January 18, 2022  TIME: 10:59 AM

## 2022-01-18 NOTE — ED NOTES
Bed: 18  Expected date:   Expected time:   Means of arrival:   Comments:     Diane Connell RN  01/18/22 1050

## 2022-01-18 NOTE — ED NOTES
Bladder scan performed: 752cc in bladder.   Verbally communicated to Dr Emilio Bob, per provider, pt to be straight cathed and bladder emptied     Juarez Vazquez RN  01/18/22 1131

## 2022-01-18 NOTE — PROGRESS NOTES
agreeable to PT evaluation    Restrictions:  Restrictions/Precautions: Up [place] (rec falls precautions)     SUBJECTIVE: Subjective: \"I'm feeling ok. \"    Pain  Pre Treatment Pain Screening  Comments / Details: denies    Post Treatment Pain Screening:   Pain Assessment  Pain Assessment:  (denies)    Prior Level of Function:  Social/Functional History  Lives With: Spouse  Type of Home: House  Home Layout: Two level,Bed/Bath upstairs,1/2 bath on main level  Home Access: Stairs to enter without rails  Entrance Stairs - Number of Steps: 1  Bathroom Shower/Tub: Tub/Shower unit  Bathroom Equipment: Shower chair,Grab bars in shower,Hand-held shower  ADL Assistance: [address]:  (Spouse is primary)  Ambulation Assistance: Independent  Transfer Assistance: Independent  Active : No  Patient's  Info: Call family to assist  Occupation: Retired    OBJECTIVE:   Vision: Impaired (R eye artificial)  Hearing: Exceptions to [facility]  Hearing Exceptions: Hard of hearing/hearing concerns    Cognition:  Overall Orientation Status: Within Functional Limits  Follows Commands: Within Functional Limits    Observation/Palpation  Observation: No acute distress noted. Pt pleasant and motivated. Severely [place].    ROM:  RLE PROM: WFL  RLE General PROM: Ankle DF to neutral  LLE PROM: WFL  LLE General PROM: Ankle DF to neutral    Strength:  Strength RLE  Strength RLE: WFL  Comment: Grossly 3+/5  Strength LLE  Strength LLE: WFL  Comment: Grossly 3+/5  Strength Other  Other: Trunk strength grossly 3-/5    Neuro:  Balance  Sitting - Static: Good  Sitting - Dynamic: Fair  Standing - Static: Fair;Poor  Standing - Dynamic: Poor  Comments: Severely delayed righting responses noted. Moderately impaired proactive balance strategies.      Motor Control  Gross Motor?:  (mildly decreased motor planning.)  Sensation  Overall Sensation Status: WFL    Bed mobility  Supine to Sit: Moderate assistance  Sit to Supine: Minimal assistance;Contact guard assistance  Comment: Increased time to complete. Significant assist to lift trunk from bed surface d/t pt c/o stiffness    Transfers  Sit to Stand: Minimal Assistance  Stand to sit: Minimal Assistance  Comment: Slow to complete    Ambulation  Ambulation?: Yes  Ambulation 1  Device: Hand-Held Assist (bilateral)  Assistance: Minimal assistance  Quality of Gait: Weight predominantly through heels. inconsistent foot placement. postural instability noted. Distance: 4 sidesteps to [address]    Stairs/Curb  Stairs?: No         Activity Tolerance  Activity Tolerance: Patient Tolerated treatment well  Activity Tolerance: Pt did demonstrate orthostatis upon sitting/standing. Supine = 133/79, seated = 107/59, standing = 98/-. Pt unable to remain still during testing, so suspect that some readings were inaccurate. pt generally asymptomatic throughout. PT Education  PT Education: Goals;PT Role;Plan of Care    ASSESSMENT:   Body structures, Functions, Activity limitations: Decreased functional mobility ; Decreased safe awareness;Decreased balance;Decreased coordination;Decreased ADL status; Decreased strength;Decreased ROM; Decreased endurance  Decision Making: High Complexity  History: High  Exam: Med  Clinical Presentation: Med    Prognosis: Good  Barriers to Learning: [facility]    DISCHARGE RECOMMENDATIONS:  Discharge Recommendations: Continue to assess pending progress,Patient would benefit from continued therapy after discharge    Assessment: Continued PT indicated to progress mobility and facilitate DC at highest level of indep and safety. Concerns for pt returning home at [address] [place] [address],[address] - would benefit from therapy stay prior to DC home.   REQUIRES PT FOLLOW UP: Yes      PLAN OF CARE:  Plan  Times per week: 3-6  Current Treatment Recommendations: Strengthening,Balance Training,Functional Mobility Training,Transfer Training,Stair training,Gait Training,Endurance Training,Neuromuscular Re-education,Patient/Caregiver Education & Training,Equipment Evaluation, Education, & procurement,Home Exercise Program,Safety Education & Training,Positioning,ADL/Self-care Training,ROM  Safety Devices  Type of devices: Call light within reach (spouse at bedside)    Goals:  Long term goals  Long term goal 1: Pt to complete bed mobility with indep  Long term goal 2: Pt to complete transfers with indep  Long term goal 3: Pt to ambulate 50-150ft with LRD and SBA  Long term goal 4: pt to complete TUG within 80GQK    [facility] (6 CLICK) Nawaf Damon 28 Inpatient Mobility Raw Score : 13     Therapy Time:   Individual   Time In 1503   Time Out [address] [place], [place], 01/18/22 at 3:53 PM         Definitions for assistance levels  Independent = pt does not require any physical supervision or assistance from another person for activity completion. Device may be needed.   Stand by assistance = pt requires verbal cues or instructions from another person, close to but not touching, to perform the activity  Minimal assistance= pt performs 75% or more of the activity; assistance is required to complete the activity  Moderate assistance= pt performs 50% of the activity; assistance is required to complete the activity  Maximal assistance = pt performs 25% of the activity; assistance is required to complete the activity  Dependent = pt requires total physical assistance to accomplish the task

## 2022-01-18 NOTE — H&P
Trauma Consult / H & P Note    Reason for Consult: Trauma  Consulting Provider: No att. providers found      BASIC INJURY INFORMATION:  Level of activation: CAT 2  Mode of transport: EMS  Mechanism of injury: Fall from Standing  Complicating features: NA  Protective measures: NA    HISTORY OF PRESENT INJURY:   Addis Pozo is a 80 y.o. male with a PMHx of T2DM, CKD, HTN presents to [facility] as a CAT2 trauma s/p fall from standing just prior to arrival (+)headstrike, (?)LOC, (+)ASA. Patient states he was standing from the couch to go to the bathroom when he felt lightheaded and fell into the coffee table. The fall was unwitnessed. Patient is unsure if he lost consciousness. Patient is endorsing left shoulder and left chest wall pain. Pain is worse with movement and deep breathing. Denies numbness/tingling in extremities, shortness of breath, abdominal pain, N/V, current lightheadedness. PRIMARY SURVEY:  Airway: Intact  Breathing: Normal   Breath Sounds: Breath Sounds Equal Bilaterally  Circulation:    Pulses: Normal   Skin: Normal skin color, texture and turgor  Disability:   Pupils: right prosthetic, Left PERRL   GCS:    Best Eyes: 4    Best Verbal: 5    Best Motor: 6    Total: 15    Vitals:   Vitals:    01/18/22 0820 01/18/22 0951   BP:  136/62   Pulse: 72 96   Resp: 16 16   Temp: 97.9 °F (36.6 °C)    TempSrc: Oral    SpO2: 98% 98%   Weight: 175 lb (79.4 kg)          SECONDARY SURVEY:  Neurologic: Alert and Oriented, Appropriate, Moves all Extremities, Strength Symmetrical and No Sensory Deficits   HEENT:   Head: No lacerations, bony step-offs, or abrasions and Midface stable to palpation   Eyes: Right prosthetic eye, left eye PEERL   Ears: Not Examined   Nose: Septum Midline, No crepitus with motion; and No bloody discharge;    Throat: Oral cavity without trauma   Neck: No midline tenderness and No lacerations/wounds  Pulmonary: Lung exam: breath sounds clear, no wheezes, no rales and Additional findings: Pain with palpation of the left lateral and anterior chest wall. No crepitus appreciated. No outward sign of trauma  Cardiovascular:    Pulses: Bilateral radial, femoral, DP and PT pulses are normal;  Abdomen: Appearance: Non-distended, No scars, lacerations, contusions; and Palpation: no tenderness, area of healing ecchymosis in the tee umbilical region from insulin injections   Rectal: Not performed  Pelvis/Perineum: Pelvis is stable to palpation;  Musculoskeletal:    Back/Spine: Thoracolumbar spinal column non-tender; no step off or deformity; Extremities: No gross upper or lower extremity signs of trauma; Pain with ROM of the left shoulder    PAST MEDICAL HISTORY:  Past Medical History:   Diagnosis Date    Acute metabolic encephalopathy     Anemia in CKD (chronic kidney disease)     Blind right eye     Chronic hyponatremia     CKD (chronic kidney disease) stage 4, GFR 15-29 ml/min [facility])     Diabetes mellitus ([address]     Hypertension     Kidney stone     Klebsiella pneumoniae sepsis ([facility])     Mixed hyperlipidemia     Obstructive uropathy     Paroxysmal A-fib ([facility])     Pneumonia     Sacral ulcer ([address]     Urethral stricture     UTI due to Klebsiella species        PAST SURGICAL HISTORY:  Past Surgical History:   Procedure Laterality Date    EYE SURGERY      KIDNEY STONE SURGERY  1995       PRE-ADMISSION MEDICATIONS:   Prior to Admission medications    Medication Sig Start Date End Date Taking?  Authorizing Provider   sodium bicarbonate 650 MG tablet Take 2 tablets by mouth 3 times daily 12/23/20   Eloy Liu MD   alfuzosin (UROXATRAL) 10 MG extended release tablet Take 1 tablet by mouth daily (with breakfast) 12/24/20   Eloy Liu MD   aspirin 81 MG tablet Take 81 mg by mouth daily    Historical Provider, MD   vitamin B-12 (CYANOCOBALAMIN) 500 MCG tablet Take 500 mcg by mouth daily    Historical Provider, MD   insulin glargine (LANTUS SOLOSTAR) 100 UNIT/ML injection pen Inject 10 Units into the skin nightly 7/29/19   Robert Diaz MD   insulin lispro (HUMALOG KWIKPEN) 100 UNIT/ML pen Glucose 100-120 no insulin, 121-140 2 units, 141-160 4 units, 161- 180 6 units, 181-200 8 units, 200 or higher 10 units 7/29/19   Robert Diaz MD       ALLERGIES:  Pcn [penicillins] and Hctz [hydrochlorothiazide]    SOCIAL HISTORY:   Denies tobacco, alcohol and drug use    FAMILY HISTORY:  History reviewed. No pertinent family history. REVIEW OF SYSTEMS:   Constitutional: Negative for weight loss, +fall  HENT: Negative for congestion, facial swelling and bloody nose  Eyes: Negative for vision changes  Respiratory: Negative for shortness of breath, difficulty breathing +left chest wall tenderness  Cardiovascular: Negative for chest wall pain. Gastrointestinal: Negative for abdominal distention, abdominal pain and vomiting. Genitourinary: Negative for hematuria  Musculoskeletal: Negative for gait difficulties   Skin: Negative for bruising, abrasions  Neurological: Negative for dizziness, weakness. +light-headedness. Hematological: Negative for easy bruising/bleeding  Psychiatric/Behavioral: Negative for behavioral problems. Except as noted above the remainder of the review of systems was reviewed and negative.      BASIC LABS:   CBC with Differential:    Lab Results   Component Value Date    WBC 12.7 01/18/2022    RBC 2.90 01/18/2022    HGB 8.8 01/18/2022    HCT 27.0 01/18/2022     01/18/2022    MCV 93.2 01/18/2022    MCH 30.4 01/18/2022    MCHC 32.6 01/18/2022    RDW 14.6 01/18/2022    BANDSPCT 9 12/20/2020    LYMPHOPCT 12.7 01/18/2022    MONOPCT 9.9 01/18/2022    BASOPCT 0.5 01/18/2022    MONOSABS 1.3 01/18/2022    LYMPHSABS 1.6 01/18/2022    EOSABS 0.1 01/18/2022    BASOSABS 0.1 01/18/2022     CMP:   Lab Results   Component Value Date     (L) 01/18/2022    K 5.5 (H) 01/18/2022     01/18/2022    CO2 12 (L) 01/18/2022    BUN 70 (H) 01/18/2022    CREATININE 3.06 (H) 01/18/2022 GLUCOSE 178 (H) 01/18/2022    CALCIUM 9.6 01/18/2022    PROT 9.0 (H) 01/18/2022    LABALBU 3.4 (L) 01/18/2022    BILITOT 0.3 01/18/2022    ALKPHOS 127 (H) 01/18/2022    AST 15 01/18/2022    ALT 9 01/18/2022    LABGLOM 19.3 (L) 01/18/2022    GFRAA 23.4 (L) 01/18/2022    GLOB 5.6 (H) 01/18/2022     Magnesium:   Lab Results   Component Value Date    MG 1.6 07/25/2019     Troponin:   Lab Results   Component Value Date    TROPONINI 0.064 01/18/2022     PT/INR:   Recent Labs     01/18/22  0830   PROTIME 14.5   INR 1.1     APTT:   Recent Labs     01/18/22  0830   APTT 27.5     EtOH: No results found for: ETOH    RADIOLOGY: (Personally reviewed)  CT Head:   There are chronic involutional atrophic changes usually associated with microangiopathy, similar to the prior study. There are no acute changes. CT C-Spine: No acute cervical spine abnormality. CT Chest: NEGATIVE CT OF THE CHEST. CT Abdomen/Pelvis: SEVERE BILATERAL HYDROURETERONEPHROSIS. NO OBSTRUCTING STONE. BLADDER DETAIL LIMITED ON THESE NONCONTRAST SCANS.    XRAY Left Shoulder: There are no acute osseous injuries. ASSESSMENT:  Ashley Calvillo is a 80 y.o. male with a PMHx of T2DM, CKD, HTN presents to [facility] EMERGENCY [facility] AT [place] as a CAT2 trauma s/p fall from standing just prior to arrival (+)headstrike, (?)LOC, (+)ASA. CT imaging negative for acute traumatic injury. Lab work reviewed and significant for stable hyponatremia, hyperkalemia 5.5, hyperglycemia 178, elevated BUN/creatinine which appears at baseline, leukocytosis 12.7, chronic anemia, elevated troponin 0.064. PLAN:  - Patient does not have any traumatic injuries requiring further trauma intervention or admission at this time  - Patient is clear for medical admission at this time from a trauma stand point.  - Trauma will sign off. Please contact with any further questions or concerns.        Komal Simeon PA-C  Trauma/Critical Care/General Surgery  [phone] ([phone])  [phone]     This patient's plan of care was discussed and made in collaboration with Trauma Attending physician, Adela Fiore MD.

## 2022-01-19 VITALS
RESPIRATION RATE: 18 BRPM | HEART RATE: 95 BPM | BODY MASS INDEX: 24.5 KG/M2 | HEIGHT: 71 IN | OXYGEN SATURATION: 97 % | WEIGHT: 175 LBS | SYSTOLIC BLOOD PRESSURE: 148 MMHG | DIASTOLIC BLOOD PRESSURE: 59 MMHG | TEMPERATURE: 98.2 F

## 2022-01-19 LAB
ANION GAP SERPL CALCULATED.3IONS-SCNC: 12 MEQ/L (ref 9–15)
BASOPHILS ABSOLUTE: 0.1 K/UL (ref 0–0.2)
BASOPHILS RELATIVE PERCENT: 0.5 %
BUN BLDV-MCNC: 68 MG/DL (ref 8–23)
CALCIUM SERPL-MCNC: 8.7 MG/DL (ref 8.5–9.9)
CHLORIDE BLD-SCNC: 110 MEQ/L (ref 95–107)
CO2: 14 MEQ/L (ref 20–31)
CREAT SERPL-MCNC: 3.17 MG/DL (ref 0.7–1.2)
EOSINOPHILS ABSOLUTE: 0.1 K/UL (ref 0–0.7)
EOSINOPHILS RELATIVE PERCENT: 1 %
GFR AFRICAN AMERICAN: 21
GFR AFRICAN AMERICAN: 22.4
GFR NON-AFRICAN AMERICAN: 18
GFR NON-AFRICAN AMERICAN: 18.5
GLUCOSE BLD-MCNC: 116 MG/DL (ref 60–115)
GLUCOSE BLD-MCNC: 147 MG/DL (ref 70–99)
GLUCOSE BLD-MCNC: 153 MG/DL (ref 60–115)
GLUCOSE BLD-MCNC: 257 MG/DL (ref 60–115)
HCT VFR BLD CALC: 27.7 % (ref 42–52)
HEMOGLOBIN: 9 G/DL (ref 14–18)
LV EF: 60 %
LVEF MODALITY: NORMAL
LYMPHOCYTES ABSOLUTE: 2.1 K/UL (ref 1–4.8)
LYMPHOCYTES RELATIVE PERCENT: 20.2 %
MCH RBC QN AUTO: 30.6 PG (ref 27–31.3)
MCHC RBC AUTO-ENTMCNC: 32.4 % (ref 33–37)
MCV RBC AUTO: 94.6 FL (ref 80–100)
MONOCYTES ABSOLUTE: 1.2 K/UL (ref 0.2–0.8)
MONOCYTES RELATIVE PERCENT: 11.3 %
NEUTROPHILS ABSOLUTE: 6.9 K/UL (ref 1.4–6.5)
NEUTROPHILS RELATIVE PERCENT: 67 %
PDW BLD-RTO: 14.5 % (ref 11.5–14.5)
PERFORMED ON: ABNORMAL
PLATELET # BLD: 415 K/UL (ref 130–400)
POC CREATININE: 3.3 MG/DL (ref 0.8–1.3)
POC SAMPLE TYPE: ABNORMAL
POTASSIUM REFLEX MAGNESIUM: 4.7 MEQ/L (ref 3.4–4.9)
RBC # BLD: 2.93 M/UL (ref 4.7–6.1)
SODIUM BLD-SCNC: 136 MEQ/L (ref 135–144)
TROPONIN: 0.06 NG/ML (ref 0–0.01)
WBC # BLD: 10.3 K/UL (ref 4.8–10.8)

## 2022-01-19 PROCEDURE — 97535 SELF CARE MNGMENT TRAINING: CPT

## 2022-01-19 PROCEDURE — G0378 HOSPITAL OBSERVATION PER HR: HCPCS

## 2022-01-19 PROCEDURE — 80048 BASIC METABOLIC PNL TOTAL CA: CPT

## 2022-01-19 PROCEDURE — 96376 TX/PRO/DX INJ SAME DRUG ADON: CPT

## 2022-01-19 PROCEDURE — 93306 TTE W/DOPPLER COMPLETE: CPT

## 2022-01-19 PROCEDURE — 6360000002 HC RX W HCPCS: Performed by: NURSE PRACTITIONER

## 2022-01-19 PROCEDURE — 6370000000 HC RX 637 (ALT 250 FOR IP): Performed by: NURSE PRACTITIONER

## 2022-01-19 PROCEDURE — 36415 COLL VENOUS BLD VENIPUNCTURE: CPT

## 2022-01-19 PROCEDURE — 82607 VITAMIN B-12: CPT

## 2022-01-19 PROCEDURE — 97116 GAIT TRAINING THERAPY: CPT

## 2022-01-19 PROCEDURE — 83540 ASSAY OF IRON: CPT

## 2022-01-19 PROCEDURE — 84484 ASSAY OF TROPONIN QUANT: CPT

## 2022-01-19 PROCEDURE — 83550 IRON BINDING TEST: CPT

## 2022-01-19 PROCEDURE — 99213 OFFICE O/P EST LOW 20 MIN: CPT | Performed by: UROLOGY

## 2022-01-19 PROCEDURE — 6370000000 HC RX 637 (ALT 250 FOR IP): Performed by: INTERNAL MEDICINE

## 2022-01-19 PROCEDURE — 2580000003 HC RX 258: Performed by: NURSE PRACTITIONER

## 2022-01-19 PROCEDURE — 99202 OFFICE O/P NEW SF 15 MIN: CPT | Performed by: INTERNAL MEDICINE

## 2022-01-19 PROCEDURE — 85025 COMPLETE CBC W/AUTO DIFF WBC: CPT

## 2022-01-19 PROCEDURE — 82746 ASSAY OF FOLIC ACID SERUM: CPT

## 2022-01-19 RX ADMIN — SODIUM ZIRCONIUM CYCLOSILICATE 10 G: 10 POWDER, FOR SUSPENSION ORAL at 08:55

## 2022-01-19 RX ADMIN — ASPIRIN 81 MG: 81 TABLET, CHEWABLE ORAL at 08:55

## 2022-01-19 RX ADMIN — SODIUM BICARBONATE 650 MG: 650 TABLET ORAL at 08:55

## 2022-01-19 RX ADMIN — HEPARIN SODIUM 5000 UNITS: 5000 INJECTION, SOLUTION INTRAVENOUS; SUBCUTANEOUS at 16:32

## 2022-01-19 RX ADMIN — HEPARIN SODIUM 5000 UNITS: 5000 INJECTION, SOLUTION INTRAVENOUS; SUBCUTANEOUS at 06:10

## 2022-01-19 RX ADMIN — Medication 10 ML: at 08:55

## 2022-01-19 ASSESSMENT — PAIN DESCRIPTION - LOCATION: LOCATION: RIB CAGE

## 2022-01-19 ASSESSMENT — PAIN DESCRIPTION - ORIENTATION: ORIENTATION: LEFT

## 2022-01-19 ASSESSMENT — PAIN DESCRIPTION - PAIN TYPE: TYPE: ACUTE PAIN

## 2022-01-19 ASSESSMENT — PAIN SCALES - GENERAL: PAINLEVEL_OUTOF10: 3

## 2022-01-19 NOTE — CARE COORDINATION
[facility] EMERGENCY [facility] AT [place] Case Management Initial Discharge Assessment    Met with Pt in the room to discuss discharge plan. Pt's wife was also called to gather additional information. PCP: Krunal Lockett, DO                                Date of Last Visit:     If no PCP, list provided? N/A    Discharge Planning    Living Arrangements: independently at home    Who do you live with? Pt lives with his wife. Who helps you with your care:  self    If lives at home:     Do you have any barriers navigating in your home? no    Patient can perform ADL? Yes    Current Services (outpatient and in home) :  [facility] ([address]) Pt's wife said a nurse comes to the home twice a month. Dialysis: No    Is transportation available to get to your appointments? Yes    DME Equipment:  yes - Pt uses a walker and a cane at home. Respiratory equipment: None    Respiratory provider:  no     Pharmacy:  yes - [address] with Medication Assistance Program?  No      Patient agreeable to [address]? Yes, Company TBD    Patient agreeable to SNF/Rehab? Declined    Other discharge needs identified? N/A    Does Patient Have a High-Risk for Readmission Diagnosis (CHF, PN, MI, COPD)? No      Initial Discharge Plan? (Note: please see concurrent daily documentation for any updates after initial note). Pt is alert and oriented. Pt is from home with his wife. Pt's wife said he uses a walker and cane at home and is able to complete his ADLs. Pt and his wife are denying a SNF at this time. Pt plans to return home at [place] with [address]. DCCOP completed.     Readmission Risk              Risk of Unplanned Readmission:  0         Electronically signed by Merlyn Rod LCSW on 1/19/2022 at 1:57 PM

## 2022-01-19 NOTE — CARE COORDINATION
MET WITH THE PATIENT. PATIENT AGREEABLE TO HOME HEALTHCARE SERVICES AT HOME. ORDERS ON CHART. FOC OFFERED AND PATIENT HAS SELECTED [facility]. PATIENT PLANS TO D/C HOME THIS EVENING. REFERRAL CALLED OVER TO [facility].

## 2022-01-19 NOTE — CONSULTS
[address] INPATIENT  CONSULTATION NOTE                                                                                                                                                                                                Reason for Consult  Inability of nursing staff to get Lopez catheter in the ER    History of Present Illness  Patient admitted for syncope  History of urethral strictures  High postvoid residuals  Chronic renal disease with stable creatinine over the past 3 years  Urinary incontinence  Multiple attempts at placing a Lopez cath in the ER failed  This was the same scenario in 2019  It was decided to manage the patient with a condom cath      Urologic Review of Systems/Symptoms  Other Urologic: History of strictures high residuals    Review of Systems    All 14 categories of Review of Systems otherwise reviewed no other findings reported.     Past Medical History:   Diagnosis Date    Acute metabolic encephalopathy     Anemia in CKD (chronic kidney disease)     Blind right eye     Chronic hyponatremia     CKD (chronic kidney disease) stage 4, GFR 15-29 ml/min [facility]     Diabetes mellitus ([address]     Hypertension     Kidney stone     Klebsiella pneumoniae sepsis ([facility]     Mixed hyperlipidemia     Obstructive uropathy     Paroxysmal A-fib ([facility]     Pneumonia     Sacral ulcer ([address]     Urethral stricture     UTI due to Klebsiella species      Past Surgical History:   Procedure Laterality Date    EYE SURGERY      KIDNEY STONE SURGERY  1995     Social History     Socioeconomic History    Marital status:      Spouse name: None    Number of children: None    Years of education: None    Highest education level: None   Occupational History    None   Tobacco Use    Smoking status: Former Smoker    Smokeless tobacco: Never Used   Vaping Use    Vaping Use: Never used   Substance and Sexual Activity    Alcohol use: Not Currently    Drug use: Never    Sexual activity: None   Other Topics Concern    None   Social History Narrative    None     Social Determinants of Health     Financial Resource Strain:     Difficulty of Paying Living Expenses: Not on file   Food Insecurity:     Worried About Running Out of Food in the Last Year: Not on file    Arthur of Food in the Last Year: Not on file   Transportation Needs:     Lack of Transportation (Medical): Not on file    Lack of Transportation (Non-Medical): Not on file   Physical Activity:     Days of Exercise per Week: Not on file    Minutes of Exercise per Session: Not on file   Stress:     Feeling of Stress : Not on file   Social Connections:     Frequency of Communication with Friends and Family: Not on file    Frequency of Social Gatherings with Friends and Family: Not on file    Attends Yazidism Services: Not on file    Active Member of [address] [place] [address] or Organizations: Not on file    Attends Club or Organization Meetings: Not on file    Marital Status: Not on file   Intimate Partner Violence:     Fear of Current or Ex-Partner: Not on file    Emotionally Abused: Not on file    Physically Abused: Not on file    Sexually Abused: Not on file   Housing Stability:     Unable to Pay for Housing in the Last Year: Not on file    Number of [place] in the Last Year: Not on file    Unstable Housing in the Last Year: Not on file     History reviewed. No pertinent family history.   Current Facility-Administered Medications   Medication Dose Route Frequency Provider Last Rate Last Admin    sodium chloride flush 0.9 % injection 5-40 mL  5-40 mL IntraVENous 2 times per day Redge Constmukesh APRN - NP   10 mL at 01/19/22 0855    sodium chloride flush 0.9 % injection 5-40 mL  5-40 mL IntraVENous PRN Redge Constable, APRN - NP        0.9 % sodium chloride infusion  25 mL IntraVENous PRN Redge Constable, APRN - NP        ondansetron (ZOFRAN-ODT) disintegrating tablet 4 mg  4 mg Oral Q8H PRN Redge Constable, APRN - NP        Or    ondansetron (ZOFRAN) injection 4 mg  4 mg IntraVENous Q6H PRN Chantal Cleaves, APRN - NP        polyethylene glycol (GLYCOLAX) packet 17 g  17 g Oral Daily PRN Chantal Cleaves, APRN - NP        acetaminophen (TYLENOL) tablet 650 mg  650 mg Oral Q6H PRN Chantal Cleaves, APRN - NP        Or   [facility] acetaminophen (TYLENOL) suppository 650 mg  650 mg Rectal Q6H PRN Chantal Cleaves, APRN - NP        senna (SENOKOT) tablet 8.6 mg  1 tablet Oral Daily PRN Chantal Cleaves, APRN - NP        insulin lispro (HUMALOG) injection vial 0-12 Units  0-12 Units SubCUTAneous TID WC Chantal Cleaves, APRN - NP   2 Units at 01/18/22 1247    insulin lispro (HUMALOG) injection vial 0-6 Units  0-6 Units SubCUTAneous Nightly Chantal Cleaves, APRN - NP        glucose (GLUTOSE) 40 % oral gel 15 g  15 g Oral PRN Chantal Cleaves, APRN - NP        dextrose 50 % IV solution  12.5 g IntraVENous PRN Chantal Cleaves, APRN - NP        glucagon (rDNA) injection 1 mg  1 mg IntraMUSCular PRN Chantal Cleaves, APRN - NP        dextrose 5 % solution  100 mL/hr IntraVENous PRN Chantal Cleaves, APRN - NP        aspirin chewable tablet 81 mg  81 mg Oral Daily Chantal Cleaves, APRN - NP   81 mg at 01/19/22 0855    heparin (porcine) injection 5,000 Units  5,000 Units SubCUTAneous 3 times per day Chantal Cleaves, APRN - NP   5,000 Units at 01/19/22 0610    sodium bicarbonate tablet 650 mg  650 mg Oral BID [facility], DO   650 mg at 01/19/22 0855     Pcn [penicillins] and Hctz [hydrochlorothiazide]  All reviewed and verified by Dr Zohra Jenkins on today's visit    No results found for: PSA, PSADIA  [unfilled]    Physical Exam  Vitals:    01/18/22 1330 01/18/22 1800 01/18/22 2030 01/18/22 2146   BP: 121/62 127/61 (!) 124/57 (!) 148/59   Pulse: 73 71 78 80   Resp: 17 17 16 18   Temp:    98.2 °F (36.8 °C)   TempSrc:    Oral   SpO2: 98% 98% 98% 97%   Weight:       Height:         Constitutional: Not in distress patient voiding  Has diaper on  Patient defers catheter placement after conversation. Complaining of the incontinence. Assessment  Urinary incontinence  Overflow incontinence secondary to urethral strictures  Stable creatinine clearance  Patient defers catheter placement  Plan  Would recommend no further attempts at placing a Lopez catheter in this patient  He should be managed with diapers and or condom cath  Urology sign off  Greater 50% of 50 minutes spent evaluating patient face to face. Felicia Antoine MD FACS    Please note this report has been partially produced using speech recognition software  And may cause contain errors related to that system including grammar, punctuation and spelling as well as words and phrases that may seem inappropriate. If there are questions or concerns please feel free to contact me to clarify.

## 2022-01-19 NOTE — CARE COORDINATION
[facility]EM[facility]. PER PT RECOMMENDING SNF LOC AT D/C. SW IS TO F/U WITH PATIENT FOR D/C PLANNING.

## 2022-01-19 NOTE — PROGRESS NOTES
Physical Therapy Med Surg Daily Treatment Note  Facility/Department: [facility]  Room: Q235/B932-75       NAME: Maria Eugenia Suárez  : 1932 (80 y.o.)  MRN: [id number]  CODE STATUS: DNR-CCA    Date of Service: 2022    Patient Diagnosis(es): Syncope and collapse [R55]  Hyperkalemia [E87.5]  Elevated troponin [R77.8]  Stage 4 chronic kidney disease [facility]) [N18.4]   Chief Complaint   Patient presents with   Yomaira Maul     pt fell getting of the cough this AM hitting the coffee table , c/o lef side rb and left shoulder pain     Patient Active Problem List    Diagnosis Date Noted    Elevated troponin     Syncope and collapse 2022    Hydronephrosis     General weakness 2020    Chronic kidney disease, stage IV (severe) ([address] 2020    Anemia of chronic renal failure 2020    Ataxic gait     Dementia due to Alzheimer's disease ([address]     Bradycardia 10/03/2019    Fall at home 10/03/2019    Hyponatremia 10/03/2019    Hyperkalemia 10/03/2019    Hypotension 10/03/2019    [facility]fib [facility]     Hypertension     Anemia in CKD (chronic kidney disease)     Gram negative sepsis ([facility])     Klebsiella pneumoniae sepsis ([address]     Uncontrolled type 2 diabetes mellitus with hyperglycemia ([address]     Pneumonia 2019        Past Medical History:   Diagnosis Date    Acute metabolic encephalopathy     Anemia in CKD (chronic kidney disease)     Blind right eye     Chronic hyponatremia     CKD (chronic kidney disease) stage 4, GFR 15-29 ml/min ([facility])     Diabetes mellitus ([address]     Hypertension     Kidney stone     Klebsiella pneumoniae sepsis ([facility])     Mixed hyperlipidemia     Obstructive uropathy     Paroxysmal A-fib ([address]     Pneumonia     Sacral ulcer ([address]     Urethral stricture     UTI due to Klebsiella species      Past Surgical History:   Procedure Laterality Date    EYE SURGERY      KIDNEY STONE SURGERY         Restrictions  Restrictions/Precautions: Fall Risk    SUBJECTIVE   General  Chart Reviewed: Yes  Family / Caregiver Present: No  Subjective  Subjective: \"They don't let me get up! \"    Pre-Session Pain Report  Pre Treatment Pain Screening  Pain at present: 3  Scale Used: Numeric Score  Intervention List: Patient able to continue with treatment  Pain Screening  Patient Currently in Pain: Yes       Post-Session Pain Report  Pain Assessment  Pain Assessment: 0-10  Pain Level: 3  Pain Type: Acute pain  Pain Location: Rib cage  Pain Orientation: Left         OBJECTIVE        Bed mobility  Rolling to Left: Stand by assistance  Rolling to Right: Stand by assistance  Supine to Sit: Minimal assistance  Sit to Supine: Minimal assistance  Comment: increased time to complete, Min A to lift trunk upright on EOB. good carryover of cues. Transfers  Sit to Stand: Contact guard assistance  Stand to sit: Contact guard assistance  Comment: + Orthostatic hypotension. pt asymptomatic. vc's for hand placement and sequencing. Ambulation  Ambulation?: Yes  Ambulation 1  Surface: level tile  Device: Rolling Walker  Assistance: Minimal assistance  Quality of Gait: Weight predominantly through heels. inconsistent foot placement. postural instability noted. Distance: 13'  Comments: pt asymptomatic, tolerates ambulation well. Activity Tolerance  Activity Tolerance: Patient Tolerated treatment well          ASSESSMENT   Assessment: pt agreeable to tx. BP taken in supine 133/61 HR 94. in seated 111/52 . in standing 107/48 . pt asymptomatic.      Discharge Recommendations:  Continue to assess pending progress,Patient would benefit from continued therapy after discharge    Goals  Long term goals  Long term goal 1: Pt to complete bed mobility with indep  Long term goal 2: Pt to complete transfers with indep  Long term goal 3: Pt to ambulate 50-150ft with LRD and SBA  Long term goal 4: pt to complete TUG within 20sec    PLAN    Times per week: 3-6  Safety Devices  Type of devices: All fall risk precautions in place,Bed alarm in place,Call light within reach,Left in bed     AMPAC (6 CLICK) BASIC MOBILITY  AM-PAC Inpatient Mobility Raw Score : 16      Therapy Time   Individual   Time In 1346   Time Out 1409   Minutes 23      BM/Trsf: 15  Gait: 8       Benitez Acevedo PTA, 01/19/22 at 2:17 PM         Definitions for assistance levels  Independent = pt does not require any physical supervision or assistance from another person for activity completion. Device may be needed.   Stand by assistance = pt requires verbal cues or instructions from another person, close to but not touching, to perform the activity  Minimal assistance= pt performs 75% or more of the activity; assistance is required to complete the activity  Moderate assistance= pt performs 50% of the activity; assistance is required to complete the activity  Maximal assistance = pt performs 25% of the activity; assistance is required to complete the activity  Dependent = pt requires total physical assistance to accomplish the task

## 2022-01-19 NOTE — PROGRESS NOTES
Physician Progress Note    2022   3:02 PM    Name:  Robinson Loyd  MRN:    [id number]     IP Day: 0     Admit Date: 2022  8:11 AM  PCP: [address] DO    Code Status:  DNR-CCA    This patient was seen during a global health emergency during the COVID-19 pandemic and its resultant significant effects on the delivery of care. This includes but is not limited to the following: significant ED boarding, hospital overcrowding,  limited bed availability (floor and especially ICU), limited resources (personnel, supplies, testing, services etc). While every and all efforts are made to delivery the highest quality care in a prompt way there may or may not be significant delays in care as a result. Assessment and Plan: Active Problems/ diagnosis:     Orthostatic presyncope  Fall  CKD 4-seen by nephrologist who is listed as his PCP  Paroxysmal A. fib  Mild dementia  Diabetes type 2  Anemia      Plan  Awaiting repeat orthostatic vitals prior to discharge  Echocardiogram reviewed  Seen by cardiologist  Monitor on telemetry    DVT PPx     Subjective:     No new symptoms. He wants to go home.     Physical Examination:      Vitals:  BP (!) 148/59   Pulse 80   Temp 98.2 °F (36.8 °C) (Oral)   Resp 18   Ht 5' 11\" (1.803 m)   Wt 175 lb (79.4 kg)   SpO2 97%   BMI 24.41 kg/m²   Temp (24hrs), Av.2 °F (36.8 °C), Min:98.2 °F (36.8 °C), Max:98.2 °F (36.8 °C)      General appearance: alert, cooperative and no distress  Mental Status: oriented to person, place and time and normal affect  Lungs: clear to auscultation bilaterally, normal effort  Heart: regular rate and rhythm, no murmur  Abdomen: soft, nontender, nondistended, bowel sounds present, no masses  Extremities: no edema, redness, tenderness in the calves  Skin: no gross lesions, rashes    Data:     Labs:  Recent Labs     22  0830 22  0843   WBC 12.7* 10.3   HGB 8.8* 9.0*   * 415*     Recent Labs     22  0830 22  0836 01/19/22  0843   *  --  136   K 5.5*  --  4.7     --  110*   CO2 12*  --  14*   BUN 70*  --  68*   CREATININE 3.06* 3.3* 3.17*   GLUCOSE 178*  --  147*     Recent Labs     01/18/22  0830   AST 15   ALT 9   BILITOT 0.3   ALKPHOS 127*       Current Facility-Administered Medications   Medication Dose Route Frequency Provider Last Rate Last Admin    sodium chloride flush 0.9 % injection 5-40 mL  5-40 mL IntraVENous 2 times per day Elizabeth Mile, APRN - NP   10 mL at 01/19/22 0855    sodium chloride flush 0.9 % injection 5-40 mL  5-40 mL IntraVENous PRN Elizabeth Mile, APRN - NP        0.9 % sodium chloride infusion  25 mL IntraVENous PRN Elizabeth Mile, APRN - NP        ondansetron (ZOFRAN-ODT) disintegrating tablet 4 mg  4 mg Oral Q8H PRN Elizabeth Mile, APRN - NP        Or    ondansetron (ZOFRAN) injection 4 mg  4 mg IntraVENous Q6H PRN Elizabeth Mile, APRN - NP        polyethylene glycol (GLYCOLAX) packet 17 g  17 g Oral Daily PRN Elizabeth Mile, APRN - NP        acetaminophen (TYLENOL) tablet 650 mg  650 mg Oral Q6H PRN Elizabeth Mile, APRN - NP        Or   [place] Kartik acetaminophen (TYLENOL) suppository 650 mg  650 mg Rectal Q6H PRN Elizabeth Mile, APRN - NP        senna (SENOKOT) tablet 8.6 mg  1 tablet Oral Daily PRN Elizabeth Mile, APRN - NP        insulin lispro (HUMALOG) injection vial 0-12 Units  0-12 Units SubCUTAneous TID WC Elizabeth Mile, APRN - NP   2 Units at 01/18/22 1247    insulin lispro (HUMALOG) injection vial 0-6 Units  0-6 Units SubCUTAneous Nightly Elizabeth Mile, APRN - NP        glucose (GLUTOSE) 40 % oral gel 15 g  15 g Oral PRN Elizabeth Mile, APRN - NP        dextrose 50 % IV solution  12.5 g IntraVENous PRN Elizabeth Mile, APRN - NP        glucagon (rDNA) injection 1 mg  1 mg IntraMUSCular PRN Elizabeth Mile, APRN - NP        dextrose 5 % solution  100 mL/hr IntraVENous PRN Elizabeth BETTIE Morris NP        aspirin chewable tablet 81 mg  81 mg Oral Daily Salima SeguraBETTIE - NP   81 mg at 01/19/22 0855    heparin (porcine) injection 5,000 Units  5,000 Units SubCUTAneous 3 times per day Salima Segura BETTIE Hou NP   5,000 Units at 01/19/22 0610    sodium bicarbonate tablet 650 mg  650 mg Oral BID Minnehahadimitry RendonDO   650 mg at 01/19/22 0016       Additional work up or/and treatment plan may be added today or then after based on clinical progression. I am managing a portion of pt care. Some medical issues are handled by other specialists. Additional work up and treatment should be done in out pt setting by pt PCP and other out pt providers. In addition to examining and evaluating pt, I spent additional time explaining care, normaland abnormal findings, and treatment plan. All of pt questions were answered. Counseling, diet and education were provided. Case will be discussed with nursing staff when appropriate. Family will be updated if and when appropriate.        Electronically signed by Dawna Joseph DO on 1/19/2022 at 3:02 PM

## 2022-01-19 NOTE — PROGRESS NOTES
Chief Complaint   Patient presents with    Fall     pt fell getting of the cough this AM hitting the coffee table , c/o lef side rb and left shoulder pain       SUBJECTIVE:  Doing ok. No complaints. Sinus tach at 104bpm on tele. Echo is pending. No syncope. HD stable.      Past Medical History:   Diagnosis Date    Acute metabolic encephalopathy     Anemia in CKD (chronic kidney disease)     Blind right eye     Chronic hyponatremia     CKD (chronic kidney disease) stage 4, GFR 15-29 ml/min ([facility])     Diabetes mellitus ([address]     Hypertension     Kidney stone     Klebsiella pneumoniae sepsis ([facility])     Mixed hyperlipidemia     Obstructive uropathy     Paroxysmal A-fib ([facility])     Pneumonia     Sacral ulcer ([address]     Urethral stricture     UTI due to Klebsiella species      Patient Active Problem List   Diagnosis    Pneumonia    Uncontrolled type 2 diabetes mellitus with hyperglycemia ([address]    Gram negative sepsis ([address]    Klebsiella pneumoniae sepsis ([facility])    Bradycardia    Fall at home    Hyponatremia    Hyperkalemia    [facility]oxysmal A-fib ([facility])    Hypertension    Anemia in CKD (chronic kidney disease)    Hypotension    Ataxic gait    Dementia due to Alzheimer's disease ([address]    Chronic kidney disease, stage IV (severe) ([facility])    Anemia of chronic renal failure    General weakness    Hydronephrosis    Syncope and collapse    Elevated troponin       Current Facility-Administered Medications   Medication Dose Route Frequency Provider Last Rate Last Admin    sodium chloride flush 0.9 % injection 5-40 mL  5-40 mL IntraVENous 2 times per day BETTIE Javed NP   10 mL at 01/19/22 0855    sodium chloride flush 0.9 % injection 5-40 mL  5-40 mL IntraVENous PRN BETTIE Javed - NP        0.9 % sodium chloride infusion  25 mL IntraVENous PRN BETTIE Javed - SCAR        ondansetron (ZOFRAN-ODT) disintegrating tablet 4 mg  4 mg Oral Q8H PRN BETTIE Javed - SCAR Or    ondansetron (ZOFRAN) injection 4 mg  4 mg IntraVENous Q6H PRN Sarahy Thurmond, APRN - NP        polyethylene glycol (GLYCOLAX) packet 17 g  17 g Oral Daily PRN Sarahy Thurmond, APRN - NP        acetaminophen (TYLENOL) tablet 650 mg  650 mg Oral Q6H PRN Sarahy Thurmond, APRN - NP        Or   Mary Larger acetaminophen (TYLENOL) suppository 650 mg  650 mg Rectal Q6H PRN Sarahy Thurmond, APRN - NP        senna (SENOKOT) tablet 8.6 mg  1 tablet Oral Daily PRN Sarahy Thurmond, APRN - NP        insulin lispro (HUMALOG) injection vial 0-12 Units  0-12 Units SubCUTAneous TID WC Sarahy Thurmond, APRN - NP   2 Units at 01/18/22 1247    insulin lispro (HUMALOG) injection vial 0-6 Units  0-6 Units SubCUTAneous Nightly Sarahy Thurmond, APRN - NP        glucose (GLUTOSE) 40 % oral gel 15 g  15 g Oral PRN Sarahy Thurmond, APRN - NP        dextrose 50 % IV solution  12.5 g IntraVENous PRN Sarahy Thurmond, APRN - NP        glucagon (rDNA) injection 1 mg  1 mg IntraMUSCular PRN Sarahy Thurmond, APRN - NP        dextrose 5 % solution  100 mL/hr IntraVENous PRN Sarahy Thurmond, APRN - NP        aspirin chewable tablet 81 mg  81 mg Oral Daily Sarahy Thurmond, APRN - NP   81 mg at 01/19/22 0855    heparin (porcine) injection 5,000 Units  5,000 Units SubCUTAneous 3 times per day Sarahy Thurmond, APRN - NP   5,000 Units at 01/19/22 0610    sodium bicarbonate tablet 650 mg  650 mg Oral BID Farshad Anita Kleink, DO   650 mg at 01/19/22 0855       ALLERGIES: Pcn [penicillins] and Hctz [hydrochlorothiazide]      OBJECTIVE:     VITALS:  Blood pressure (!) 148/59, pulse 80, temperature 98.2 °F (36.8 °C), temperature source Oral, resp. rate 18, height 5' 11\" (1.803 m), weight 175 lb (79.4 kg), SpO2 97 %. Body mass index is 24.41 kg/m².     Physical Exam      LABS:  Recent Results (from the past 24 hour(s))   Troponin    Collection Time: 01/18/22 12:12 PM   Result Value Ref Range    Troponin 0.066 (HH) 0.000 - 0.010 ng/mL   POCT Glucose Collection Time: 01/18/22 12:23 PM   Result Value Ref Range    POC Glucose 151 (H) 60 - 115 mg/dl    Performed on ACCU-CHEK    POCT Glucose    Collection Time: 01/18/22  9:47 PM   Result Value Ref Range    POC Glucose 220 (H) 60 - 115 mg/dl    Performed on ACCU-CHEK    POCT Glucose    Collection Time: 01/19/22  8:03 AM   Result Value Ref Range    POC Glucose 116 (H) 60 - 115 mg/dl    Performed on ACCU-CHEK      Troponin:    Lab Results   Component Value Date    TROPONINI 0.066 01/18/2022       Impression     Syncope- likely orthostatic in nature, rule out malignant arrhythmia/cardiac ischemia/valvular heart disease/etc.      Elevated trops, likely demand ischemia due to renal failure. No angina.      + orthostatics     ? Hx of Pafib     CKD     Chronic hydronephrosis     DM     HTN     Hyperkalemia     anemia     Normal LVF        Plan     1. await 2D Echo for LV function, PA pressures, wall motion abnormalities and any significant valvular disease. 2. Monitor on tele  3. Recheck orthostatics  4. Max cardiac meds  5. Conservative med rx given advanced age, co morbidities, dementia  6. Nephro/Urology recs  7. Avoid nephrotoxic agents  8. Gi/DVT proph  9. Further recs to follow.      Thank you for allowing me to participate in the care of your patient, please don't hesitate to contact me if you have any further questions.     Electronically signed by DO Stefani on 1/19/2022 at 9:18 AM

## 2022-01-19 NOTE — DISCHARGE SUMMARY
Hospital Medicine Discharge Summary    Levi Renee  :  1932  MRN:  [id number]    Admit date:  2022  Discharge date:  2022    Admitting Physician:  Alejandra Simmons DO  Primary Care Physician:  Paola Farley DO    This patient was seen during a global health during the COVID-19 pandemic and its resultant significant effects on the delivery of emergency care. This includes but is not limited to the following: significant ED boarding, hospital overcrowding,  limited bed availability (floor and especially ICU), limited resources (personnel, supplies, testing, services etc). While every and all efforts are made to delivery the highest quality care in a prompt way there may or may not be significant delays in care as a result. Discharge Diagnoses:      Orthostatic presyncope  Concern for urine retention-patient is incontinent of urine, not retaining  Fall-PT recommended SNF but patient and wife declined. CKD 4    Chief Complaint   Patient presents with    Fall     pt fell getting of the cough this AM hitting the coffee table , c/o lef side rb and left shoulder pain     Hospital Course:       Patient was admitted for a fall with concern for presyncope. No syncopal event. Echo was done and was normal LV function and structure, EF 60%, no concerning abnormality, possible diastolic dysfunction. Patient was evaluated by PT and recommended SNF but patient and family declined and wanted to go home. Patient was discharged in a stable condition. He was seen by his previous PCP listed on his chart who is a nephrologist and by cardiology. Exam on discharge:   BP (!) 148/59   Pulse 80   Temp 98.2 °F (36.8 °C) (Oral)   Resp 18   Ht 5' 11\" (1.803 m)   Wt 175 lb (79.4 kg)   SpO2 97%   BMI 24.41 kg/m²   General appearance: No apparent distress, appears stated age and cooperative. HEENT: Pupils equal, round, and reactive to light. Conjunctivae/corneas clear.   Neck: Supple, with full range of motion. No jugular venous distention. Trachea midline. Respiratory:  Normal respiratory effort. Clear to auscultation, bilaterally without Rales/Wheezes/Rhonchi. Cardiovascular: Regular rate and rhythm with normal S1/S2 without murmurs, rubs or gallops. Abdomen: Soft, non-tender, non-distended with normal bowel sounds. Musculoskeletal: No clubbing, cyanosis or edema bilaterally. Full range of motion without deformity. Skin: Skin color, texture, turgor normal.  No rashes or lesions. Neuro: Non Focal. Symetrical motor and tone. Nl Comprehension, Alert,awake and oriented. NL CN. Symetrical tone and reflexes. Psychiatric: Alert and oriented, thought content appropriate, normal insight  Capillary Refill: Brisk,< 3 seconds   Peripheral Pulses: +2 palpable, equal bilaterally     Patient was seen by the following consultants   Consults:  IP CONSULT TO CARDIOLOGY  IP CONSULT TO UROLOGY    Significant Diagnostic Studies:    Refer to chart     Please refer to chart if no studies are shown here    Echocardiogram complete 2D with doppler with color    Result Date: 1/19/2022  Transthoracic Echocardiography Report (TTE)  Demographics   Patient Name    Nechama Nageotte Gender               Male   Patient Number  [id number]       Race                 Other                                  Ethnicity   Visit Number    [id number]      Room Number          H850   Corporate ID                   Date of Study        01/19/2022   Accession       [id number]     Referring Physician  Number   Date of Birth   02/28/1932     Sonographer          Shantanu Delgadillo   Age             80 year(s)     Interpreting         [facility])                                 Physician            Cardiology                                                      Caron Wallace MD  Procedure Type of Study   TTE procedure:ECHO COMPLETE 2D W/DOP W/COLOR.   Procedure Date Date: 01/19/2022 Start: 10:36 AM Study Location: Portable Technical Quality: Limited visualization Indications:Syncope. Patient Status: Routine Height: 71 inches Weight: 175 pounds BSA: 1.99 m^2 BMI: 24.41 kg/m^2 BP: 148/59 mmHg  Conclusions   Summary  Normal left ventricle structure and function. Left ventricular ejection fraction is visually estimated at 60%. E/A flow reversal noted. Suggestive of diastolic dysfunction. Signature   ----------------------------------------------------------------  Electronically signed by Eladia Marte MD(Interpreting  physician) on 01/19/2022 01:47 PM  ----------------------------------------------------------------   Findings  Left Ventricle Normal left ventricle structure and function. Left ventricular ejection fraction is visually estimated at 60%. E/A flow reversal noted. Suggestive of diastolic dysfunction. Right Ventricle Normal right ventricle structure and function. Normal right ventricle systolic pressure. Left Atrium Mildly dilated left atrium. Right Atrium Normal right atrium. Mitral Valve MV leaflets thickened. Tricuspid Valve Normal tricuspid valve structure and function. Trace TR RVSP 32 mmHg Aortic Valve Aortic valve leaflets are moderately thickened. Pulmonic Valve The pulmonic valve was not well visualized . Pericardial Effusion No evidence of pericardial effusion. Pleural Effusion No evidence of pleural effusion. Aorta \ Miscellaneous The aorta is within normal limits.  M-Mode Measurements (cm)   LVIDd: 4.2 cm             LVIDs: 2.31 cm  IVSd: 1.05 cm             IVSs: 1.22 cm  LVPWd: 1.11 cm            LVPWs: 1.56 cm                            AO Root Dimension: 3.17 cm                            ACS: 1.39 cm                            LVOT: 1.73 cm  Doppler Measurements:   AV Peak Gradient: 5.43 mmHg           MV Peak E-Wave: 0.46 m/s                                        MV Peak A-Wave: 0.98 m/s  TR Velocity:2.7 m/s  TR Gradient:29.21 mmHg                                        Estimated RAP:3 mmHg RVSP:32.21 mmHg  Valves  Mitral Valve   Peak E-Wave: 0.46 m/s                 Peak A-Wave: 0.98 m/s                                        E/A Ratio: 0.47                                        Peak Gradient: 0.86 mmHg                                        Deceleration Time: 159.7 msec   Tissue Doppler   E' Septal Velocity: 0.06 m/s  E' Lateral Velocity: 0.06 m/s   Aortic Valve   Peak Velocity: 1.17 m/s  Peak Gradient: 5.43 mmHg   Cusp Separation: 1.39 cm   Tricuspid Valve   Estimated RVSP: 32.21 mmHg              Estimated RAP: 3 mmHg  TR Velocity: 2.7 m/s                    TR Gradient: 29.21 mmHg   Pulmonic Valve   Peak Velocity: 1.35 m/s           Peak Gradient: 7.25 mmHg                                    Estimated PASP: 32.21 mmHg   LVOT   Peak Velocity: 1.17 m/s               Mean Velocity: 0.75 m/s  Peak Gradient: 5.36 mmHg              Mean Gradient: 2.6 mmHg  LVOT Diameter: 1.73 cm                LVOT VTI: 22.07 cm  Structures  Left Atrium   LA Volume/Index: 69.03 ml /35 m^2             LA Area: 20.01 cm^2   Left Ventricle   Diastolic Dimension: 4.2 cm          Systolic Dimension: 5.15 cm  Septum Diastolic: 5.36 cm            Septum Systolic: 4.89 cm  PW Diastolic: 2.13 cm                PW Systolic: 0.03 cm                                       FS: 45 %  LV EDV/LV EDV Index: 78.4 ml/39 m^2  LV ESV/LV ESV Index: 18.32 ml/9 m^2  EF Calculated: 76.6 %                LV Length: 5.92 cm   LVOT Diameter: 1.73 cm   Right Atrium   RA Systolic Pressure: 3 mmHg   Right Ventricle            RV Systolic Pressure: 09.36 mmHg  Aorta/ Miscellaneous Aorta   Aortic Root: 3.17 cm  LVOT Diameter: 1.73 cm      CT ABDOMEN PELVIS WO CONTRAST Additional Contrast? None    Result Date: 1/18/2022  EXAMINATION: CT CHEST WO CONTRAST, CT ABDOMEN PELVIS WO CONTRAST DATE:1/18/2022 9:01 AM CLINICAL HISTORY: Anterior chest pain. Shortness of breath.   fall left side memo wall pain  COMPARISON:  None TECHNIQUE: Helical CT was performed through the chest without IV contrast., All CT scans at this facility use dose modulation, iterative reconstruction, and/or weight based dosing when appropriate to reduce radiation dose to as low as reasonably achievable. Some of this report was completed using Power [employer] 656 [id number] and may include unintended errors with respect to translation of words, typographical errors or grammatical errors which may not have been identified prior to the finalization of this report. FINDINGS:   Lungs: Small patchy reticular opacities at the lung bases probably dependent changes. Small nonspecific patch of groundglass opacity left midlung zone possibly inflammatory. Pleura:Normal. No effusion or thickening  Mediastinum :Mediastinum and alban are unremarkable. Vessels:Thoracic aorta is intact. Bones:No acute osseous abnormalities. Other:None     NEGATIVE CT OF THE CHEST. EXAMINATION: CT CHEST WO CONTRAST, CT ABDOMEN PELVIS WO CONTRAST DATE AND TIME:1/18/2022 9:01 AM CLINICAL HISTORY: Acute abdominal pain. Epigastric pain. fall left side memo wall pain  COMPARISON: None available. TECHNIQUE: Contiguous axial CT sections of the abdomen and pelvis. No IV contrast administered. Unenhanced imaging is limited for the evaluation of some intra-abdominal and pelvic pathologies. All CT scans at this facility use dose modulation, iterative reconstruction, and/or weight based dosing when appropriate to reduce radiation dose to as low as reasonably achievable. Some of this report was completed using  Power Scribe 531 [id number] and may include unintended errors with respect to translation of words, typographical errors or grammatical errors which may not have been identified prior to the finalization of this report. FINDINGS   Liver: Negative    Spleen: Negative   Gallbladder: No calcified gallstones. Normal gallbladder wall. No pericholecystic fluid.     Pancreas: Negative   Kidney: Bilateral severe hydroureteronephrosis. No obstructing stone. Adrenal glands are negative. Bowel: Negative  No CT evidence of appendicitis   Nodes: No lymphadenopathy. Aorta: Negative    Pelvis: Moderately distended bladder with slightly lobulated bladder wall. Assessment of bladder wall mucosa limited on these noncontrast scans. No abnormal soft tissue mass   Peritoneum: No free fluid or free air. The abdominal wall is intact. Bones: No acute osseous abnormalities. Other:None IMPRESSION: SEVERE BILATERAL HYDROURETERONEPHROSIS. NO OBSTRUCTING STONE. BLADDER DETAIL LIMITED ON THESE NONCONTRAST SCANS. CT Head WO Contrast    Result Date: 1/18/2022  EXAMINATION: CT HEAD WO CONTRAST, 1/18/2022 9:01 AM CLINICAL HISTORY:  fall COMPARISON: Brain CT from October 3, 2019 TECHNIQUE:  Multiple contiguous axial images of the head were obtained from the skull base through the skull vertex without intravenous contrast. Sagittal and coronal reformats have been produced. All CT scans at this facility use dose modulation, iterative reconstruction, and/or weight based dosing when appropriate to reduce radiation dose to as low as reasonably achievable. BRAIN CT FINDINGS: Gray-white matter differentiation is maintained. No acute hemorrhage, mass, mass effect, or midline shift. There is prominence of sulci and ventricles indicating mild global cerebral atrophy and chronic involutional changes. . There is a persistent area of encephalomalacia indicating prior chronic ischemia in the medial right frontal lobe. The subcortical and periventricular white matter is within normal limits. The basal ganglia are within normal limits. There are no acute changes or space-occupying lesions in the posterior fossa. The orbits are unchanged since the previous examination demonstrating a prosthetic on the right side and previous scleral banding on the left.  There is mucoperiosteal thickening of the maxillary sinuses, left greater than right consistent chronic sinusitis. The calvarium is intact. There are chronic involutional atrophic changes usually associated with microangiopathy, similar to the prior study. There are no acute changes. CT CHEST WO CONTRAST    Result Date: 1/18/2022  EXAMINATION: CT CHEST WO CONTRAST, CT ABDOMEN PELVIS WO CONTRAST DATE:1/18/2022 9:01 AM CLINICAL HISTORY: Anterior chest pain. Shortness of breath. fall left side memo wall pain  COMPARISON:  None TECHNIQUE: Helical CT was performed through the chest without IV contrast., All CT scans at this facility use dose modulation, iterative reconstruction, and/or weight based dosing when appropriate to reduce radiation dose to as low as reasonably achievable. Some of this report was completed using [employer] VB[employer] [id number] and may include unintended errors with respect to translation of words, typographical errors or grammatical errors which may not have been identified prior to the finalization of this report. FINDINGS:   Lungs: Small patchy reticular opacities at the lung bases probably dependent changes. Small nonspecific patch of groundglass opacity left midlung zone possibly inflammatory. Pleura:Normal. No effusion or thickening  Mediastinum :Mediastinum and alban are unremarkable. Vessels:Thoracic aorta is intact. Bones:No acute osseous abnormalities. Other:None     NEGATIVE CT OF THE CHEST. EXAMINATION: CT CHEST WO CONTRAST, CT ABDOMEN PELVIS WO CONTRAST DATE AND TIME:1/18/2022 9:01 AM CLINICAL HISTORY: Acute abdominal pain. Epigastric pain. fall left side memo wall pain  COMPARISON: None available. TECHNIQUE: Contiguous axial CT sections of the abdomen and pelvis. No IV contrast administered. Unenhanced imaging is limited for the evaluation of some intra-abdominal and pelvic pathologies.   All CT scans at this facility use dose modulation, iterative reconstruction, and/or weight based dosing when appropriate to reduce radiation dose to as low as reasonably achievable. Some of this report was completed using  [employer] [id number] and may include unintended errors with respect to translation of words, typographical errors or grammatical errors which may not have been identified prior to the finalization of this report. FINDINGS   Liver: Negative    Spleen: Negative   Gallbladder: No calcified gallstones. Normal gallbladder wall. No pericholecystic fluid. Pancreas: Negative   Kidney: Bilateral severe hydroureteronephrosis. No obstructing stone. Adrenal glands are negative. Bowel: Negative  No CT evidence of appendicitis   Nodes: No lymphadenopathy. Aorta: Negative    Pelvis: Moderately distended bladder with slightly lobulated bladder wall. Assessment of bladder wall mucosa limited on these noncontrast scans. No abnormal soft tissue mass   Peritoneum: No free fluid or free air. The abdominal wall is intact. Bones: No acute osseous abnormalities. Other:None IMPRESSION: SEVERE BILATERAL HYDROURETERONEPHROSIS. NO OBSTRUCTING STONE. BLADDER DETAIL LIMITED ON THESE NONCONTRAST SCANS. CT CERVICAL SPINE WO CONTRAST    Result Date: 1/18/2022  EXAMINATION: CT CERVICAL SPINE WO CONTRAST   DATE AND TIME:1/18/2022 9:01 AM CLINICAL HISTORY:Acute posterior neck pain  fall  COMPARISON: October 3, 8695 TECHNIQUE:Helical scanning was performed from the skull base through the remainder of the cervical spine without intravenous contrast. Sagittal and coronal reformats were obtained. The lack of contrast limits CT sensitivity of the soft tissues. All CT scans at this facility use dose modulation, iterative reconstruction, and/or weight based dosing when appropriate to reduce radiation dose to as low as reasonably achievable. FINDINGS   Fracture:No acute fracture. Alignment:  Normal cervical lordosis. No subluxation. No canal stenosis. Dense atlas:Dens atlas relationship is normal. Soft tissues:Negative. Other findings:  There is moderate cervical spondylosis with multiple level disc osteophyte changes. Severe canal stenosis at C5-6. No acute cervical spine abnormality. XR SHOULDER LEFT (MIN 2 VIEWS)    Result Date: 1/18/2022  EXAMINATION: XR SHOULDER LEFT (MIN 2 VIEWS) CLINICAL HISTORY:  fall COMPARISONS: None available. Technique: 3 views LEFT SHOULDER X-RAY FINDINGS: There are no lytic or sclerotic bone lesions. The humeral head is located. There is no fracture or subluxation. The visualized portions of the lung and chest wall are within normal limits. There are no radiopaque foreign bodies. There are no acute osseous injuries. Discharge Medications:         Medication List      CONTINUE taking these medications    alfuzosin 10 MG extended release tablet  Commonly known as: UROXATRAL  Take 1 tablet by mouth daily (with breakfast)     aspirin 81 MG tablet     insulin glargine 100 UNIT/ML injection pen  Commonly known as: Lantus SoloStar  Inject 10 Units into the skin nightly     insulin lispro 100 UNIT/ML pen  Commonly known as: HumaLOG KwikPen  Glucose 100-120 no insulin, 121-140 2 units, 141-160 4 units, 161- 180 6 units, 181-200 8 units, 200 or higher 10 units     sodium bicarbonate 650 MG tablet  Take 2 tablets by mouth 3 times daily     vitamin B-12 500 MCG tablet  Commonly known as: CYANOCOBALAMIN            Disposition:   If discharged to Home, Any [place] [address] needs that were indicated and/or required as been addressed and set up by Social Work. Condition at discharge: good     Activity: activity as tolerated    Total time taken for discharging this patient: 40 minutes. Greater than 70% of time was spent focused exclusively on this patient. Time was taken to review chart, discuss plans with consultants, reconciling medications, discussing plan answering questions with patient.      Ivory Mcadams DO  1/19/2022, 3:06 PM  ----------------------------------------------------------------------------------------------------------------------    Haylee Burton,

## 2022-01-19 NOTE — PROGRESS NOTES
Nephrology Progress Note  Lab pending today  Assessment:  CKD-4 suspected  Fall vs syncope  Bruised left shoulder /rib cage  DM type-2  Anemia  PAF  Mild dementia    =Lokelma and aylin- exalate given yesterday    [facility]  Hydro seems chronic    Voiding no issues    May need EPO dosing   Check Iron level    Patient Active Problem List:     Pneumonia     Uncontrolled type 2 diabetes mellitus with hyperglycemia (HCC)     Gram negative sepsis (HCC)     Klebsiella pneumoniae sepsis (HCC)     Bradycardia     Fall at home     Hyponatremia     Hyperkalemia     Paroxysmal A-fib (HCC)     Hypertension     Anemia in CKD (chronic kidney disease)     Hypotension     Ataxic gait     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)     Chronic kidney disease, stage IV (severe) (HCC)     Anemia of chronic renal failure     General weakness     Hydronephrosis     Syncope and collapse     Elevated troponin      Subjective:  Admit Date: 1/18/2022    Interval History: alert n distress    Medications:  Scheduled Meds:   sodium chloride flush  5-40 mL IntraVENous 2 times per day    insulin lispro  0-12 Units SubCUTAneous TID WC    insulin lispro  0-6 Units SubCUTAneous Nightly    aspirin  81 mg Oral Daily    heparin (porcine)  5,000 Units SubCUTAneous 3 times per day    sodium bicarbonate  650 mg Oral BID    sodium zirconium cyclosilicate  10 g Oral BID     Continuous Infusions:   sodium chloride      dextrose         CBC:   Recent Labs     01/18/22  0830   WBC 12.7*   HGB 8.8*   *     CMP:    Recent Labs     01/18/22  0830 01/18/22  0836   *  --    K 5.5*  --      --    CO2 12*  --    BUN 70*  --    CREATININE 3.06* 3.3*   GLUCOSE 178*  --    CALCIUM 9.6  --    LABGLOM 19.3* 18*     Troponin:   Recent Labs     01/18/22  1212   TROPONINI 0.066*     BNP: No results for input(s): BNP in the last 72 hours.   INR:   Recent Labs     01/18/22  0830   INR 1.1     Lipids: No results for input(s): CHOL, LDLDIRECT, TRIG, HDL, AMYLASE, LIPASE in the last 72 hours. Liver:   Recent Labs     01/18/22  0830   AST 15   ALT 9   ALKPHOS 127*   PROT 9.0*   LABALBU 3.4*   BILITOT 0.3     Iron:  No results for input(s): IRONS, FERRITIN in the last 72 hours. Invalid input(s): LABIRONS  Urinalysis: No results for input(s): UA in the last 72 hours.     Objective:  Vitals: BP (!) 148/59   Pulse 80   Temp 98.2 °F (36.8 °C) (Oral)   Resp 18   Ht 5' 11\" (1.803 m)   Wt 175 lb (79.4 kg)   SpO2 97%   BMI 24.41 kg/m²    Wt Readings from Last 3 Encounters:   01/18/22 175 lb (79.4 kg)   12/28/20 150 lb (68 kg)   12/18/20 150 lb (68 kg)      24HR INTAKE/OUTPUT:      Intake/Output Summary (Last 24 hours) at 1/19/2022 0806  Last data filed at 1/18/2022 1542  Gross per 24 hour   Intake 320 ml   Output --   Net 320 ml       General: alert, in no apparent distress  HEENT: normocephalic, atraumatic, anicteric  Neck: supple, no mass  Lungs: non-labored respirations, clear to auscultation bilaterally  Heart: regular rate and rhythm, no murmurs or rubs  Abdomen: soft, non-tender, non-distended  Ext: no cyanosis, no peripheral edema  Neuro: alert and oriented, no gross abnormalities  Psych: normal mood and affect  Skin: no rash      Electronically signed by Shefali Palomares DO, MD

## 2022-01-20 LAB
FOLATE: 19.7 NG/ML
IRON SATURATION: 13 % (ref 20–55)
IRON: 22 UG/DL (ref 59–158)
TOTAL IRON BINDING CAPACITY: 167 UG/DL (ref 250–450)
UNSATURATED IRON BINDING CAPACITY: 145 UG/DL (ref 112–347)
VITAMIN B-12: 1185 PG/ML (ref 232–1245)

## 2022-01-23 NOTE — PROGRESS NOTES
Physical Therapy  Facility/Department: [facility] MED SURG [id number]/[id number]  Physical Therapy Discharge      NAME: Joseph Jacob    : 1932 (80 y.o.)  MRN: [id number]    Account: [de-identified]  Gender: male      Patient has been discharged from acute care hospital. DC patient from current PT program.      Electronically signed by Julio Monroy PT on 22 at 12:46 PM EST

## 2022-06-11 ENCOUNTER — APPOINTMENT (OUTPATIENT)
Dept: CT IMAGING | Age: 87
DRG: 690 | End: 2022-06-11
Payer: MEDICARE

## 2022-06-11 ENCOUNTER — APPOINTMENT (OUTPATIENT)
Dept: GENERAL RADIOLOGY | Age: 87
DRG: 690 | End: 2022-06-11
Payer: MEDICARE

## 2022-06-11 ENCOUNTER — HOSPITAL ENCOUNTER (INPATIENT)
Age: 87
LOS: 4 days | Discharge: HOSPICE | DRG: 690 | End: 2022-06-16
Attending: EMERGENCY MEDICINE | Admitting: INTERNAL MEDICINE
Payer: MEDICARE

## 2022-06-11 DIAGNOSIS — N17.9 ACUTE KIDNEY INJURY SUPERIMPOSED ON CKD (HCC): ICD-10-CM

## 2022-06-11 DIAGNOSIS — E87.1 HYPONATREMIA: Primary | ICD-10-CM

## 2022-06-11 DIAGNOSIS — E87.5 HYPERKALEMIA: ICD-10-CM

## 2022-06-11 DIAGNOSIS — R77.8 ELEVATED TROPONIN: ICD-10-CM

## 2022-06-11 DIAGNOSIS — N18.9 ACUTE KIDNEY INJURY SUPERIMPOSED ON CKD (HCC): ICD-10-CM

## 2022-06-11 DIAGNOSIS — N19 UREMIA: ICD-10-CM

## 2022-06-11 DIAGNOSIS — N13.9 OBSTRUCTIVE UROPATHY: ICD-10-CM

## 2022-06-11 LAB — LACTIC ACID, SEPSIS: 1.7 MMOL/L (ref 0.5–1.9)

## 2022-06-11 PROCEDURE — 96360 HYDRATION IV INFUSION INIT: CPT

## 2022-06-11 PROCEDURE — 93005 ELECTROCARDIOGRAM TRACING: CPT | Performed by: EMERGENCY MEDICINE

## 2022-06-11 PROCEDURE — 80076 HEPATIC FUNCTION PANEL: CPT

## 2022-06-11 PROCEDURE — 83605 ASSAY OF LACTIC ACID: CPT

## 2022-06-11 PROCEDURE — 36415 COLL VENOUS BLD VENIPUNCTURE: CPT

## 2022-06-11 PROCEDURE — 80048 BASIC METABOLIC PNL TOTAL CA: CPT

## 2022-06-11 PROCEDURE — 51798 US URINE CAPACITY MEASURE: CPT

## 2022-06-11 PROCEDURE — 84484 ASSAY OF TROPONIN QUANT: CPT

## 2022-06-11 PROCEDURE — 85025 COMPLETE CBC W/AUTO DIFF WBC: CPT

## 2022-06-11 PROCEDURE — 83880 ASSAY OF NATRIURETIC PEPTIDE: CPT

## 2022-06-11 PROCEDURE — 71045 X-RAY EXAM CHEST 1 VIEW: CPT

## 2022-06-11 PROCEDURE — 70450 CT HEAD/BRAIN W/O DYE: CPT

## 2022-06-11 PROCEDURE — 83690 ASSAY OF LIPASE: CPT

## 2022-06-11 PROCEDURE — 72125 CT NECK SPINE W/O DYE: CPT

## 2022-06-11 PROCEDURE — 87040 BLOOD CULTURE FOR BACTERIA: CPT

## 2022-06-12 LAB
ACINETOBACTER CALCOAC BAUMANNII COMPLEX BY PCR: NOT DETECTED
ALBUMIN SERPL-MCNC: 3 G/DL (ref 3.5–4.6)
ALP BLD-CCNC: 116 U/L (ref 35–104)
ALT SERPL-CCNC: 7 U/L (ref 0–41)
ANION GAP SERPL CALCULATED.3IONS-SCNC: 13 MEQ/L (ref 9–15)
ANION GAP SERPL CALCULATED.3IONS-SCNC: 13 MEQ/L (ref 9–15)
ANION GAP SERPL CALCULATED.3IONS-SCNC: 14 MEQ/L (ref 9–15)
ANISOCYTOSIS: ABNORMAL
AST SERPL-CCNC: 13 U/L (ref 0–40)
BACTEROIDES FRAGILIS BY PCR: NOT DETECTED
BASE EXCESS VENOUS: -18 (ref -3–3)
BASOPHILS ABSOLUTE: 0 K/UL (ref 0–0.2)
BASOPHILS RELATIVE PERCENT: 0.3 %
BILIRUB SERPL-MCNC: 0.3 MG/DL (ref 0.2–0.7)
BILIRUBIN DIRECT: <0.2 MG/DL (ref 0–0.4)
BILIRUBIN, INDIRECT: ABNORMAL MG/DL (ref 0–0.6)
BUN BLDV-MCNC: 108 MG/DL (ref 8–23)
BUN BLDV-MCNC: 110 MG/DL (ref 8–23)
BUN BLDV-MCNC: 111 MG/DL (ref 8–23)
BURR CELLS: ABNORMAL
CALCIUM IONIZED: 1.28 MMOL/L (ref 1.12–1.32)
CALCIUM SERPL-MCNC: 8.9 MG/DL (ref 8.5–9.9)
CALCIUM SERPL-MCNC: 9.1 MG/DL (ref 8.5–9.9)
CALCIUM SERPL-MCNC: 9.5 MG/DL (ref 8.5–9.9)
CANDIDA ALBICANS BY PCR: NOT DETECTED
CANDIDA AURIS BY PCR: NOT DETECTED
CANDIDA GLABRATA BY PCR: NOT DETECTED
CANDIDA KRUSEI BY PCR: NOT DETECTED
CANDIDA PARAPSILOSIS BY PCR: NOT DETECTED
CANDIDA TROPICALIS BY PCR: NOT DETECTED
CARBAPENEM RESISTANCE IMP GENE BY PCR: NOT DETECTED
CARBAPENEM RESISTANCE KPC BY PCR: NOT DETECTED
CARBAPENEM RESISTANCE NDM GENE BY PCR: NOT DETECTED
CARBAPENEM RESISTANCE OXA-48 GENE BY PCR: NOT DETECTED
CARBAPENEM RESISTANCE VIM GENE BY PCR: NOT DETECTED
CEPHALOSPORIN RESISTANCE CTX-M GENE BY PCR: NOT DETECTED
CHLORIDE BLD-SCNC: 100 MEQ/L (ref 95–107)
CHLORIDE BLD-SCNC: 100 MEQ/L (ref 95–107)
CHLORIDE BLD-SCNC: 95 MEQ/L (ref 95–107)
CO2: 11 MEQ/L (ref 20–31)
CO2: 12 MEQ/L (ref 20–31)
CO2: 13 MEQ/L (ref 20–31)
CREAT SERPL-MCNC: 4.04 MG/DL (ref 0.7–1.2)
CREAT SERPL-MCNC: 4.12 MG/DL (ref 0.7–1.2)
CREAT SERPL-MCNC: 4.29 MG/DL (ref 0.7–1.2)
CRYPTOCOCCUS NEOFORMANS/GATTII BY PCR: NOT DETECTED
ENTEROBACTER CLOACAE COMPLEX BY PCR: NOT DETECTED
ENTEROBACTERALES BY PCR: DETECTED
ENTEROCOCCUS FAECALIS BY PCR: NOT DETECTED
ENTEROCOCCUS FAECIUM BY PCR: NOT DETECTED
EOSINOPHILS ABSOLUTE: 0 K/UL (ref 0–0.7)
EOSINOPHILS RELATIVE PERCENT: 0.1 %
ESCHERICHIA COLI BY PCR: NOT DETECTED
GFR AFRICAN AMERICAN: 15.8
GFR AFRICAN AMERICAN: 16.6
GFR AFRICAN AMERICAN: 16.9
GFR AFRICAN AMERICAN: 17
GFR NON-AFRICAN AMERICAN: 13.1
GFR NON-AFRICAN AMERICAN: 13.7
GFR NON-AFRICAN AMERICAN: 14
GFR NON-AFRICAN AMERICAN: 14
GLUCOSE BLD-MCNC: 266 MG/DL (ref 70–99)
GLUCOSE BLD-MCNC: 270 MG/DL (ref 70–99)
GLUCOSE BLD-MCNC: 293 MG/DL (ref 70–99)
GLUCOSE BLD-MCNC: 294 MG/DL (ref 70–99)
GLUCOSE BLD-MCNC: 298 MG/DL (ref 70–99)
HAEMOPHILUS INFLUENZAE BY PCR: NOT DETECTED
HCO3 VENOUS: 9.8 MMOL/L (ref 23–29)
HCT VFR BLD CALC: 26.2 % (ref 42–52)
HEMOGLOBIN: 8.3 G/DL (ref 14–18)
HEMOGLOBIN: 8.8 GM/DL (ref 13.5–17.5)
KLEBSIELLA AEROGENES BY PCR: NOT DETECTED
KLEBSIELLA OXYTOCA BY PCR: NOT DETECTED
KLEBSIELLA PNEUMONIAE GROUP BY PCR: NOT DETECTED
LACTATE: 2.17 MMOL/L (ref 0.4–2)
LACTIC ACID, SEPSIS: 0.7 MMOL/L (ref 0.5–1.9)
LIPASE: 213 U/L (ref 12–95)
LISTERIA MONOCYTOGENES BY PCR: NOT DETECTED
LYMPHOCYTES ABSOLUTE: 2.6 K/UL (ref 1–4.8)
LYMPHOCYTES RELATIVE PERCENT: 18 %
MCH RBC QN AUTO: 30.4 PG (ref 27–31.3)
MCHC RBC AUTO-ENTMCNC: 31.7 % (ref 33–37)
MCV RBC AUTO: 95.6 FL (ref 80–100)
METAMYELOCYTES RELATIVE PERCENT: 1 %
MONOCYTES ABSOLUTE: 0.7 K/UL (ref 0.2–0.8)
MONOCYTES RELATIVE PERCENT: 5 %
NEISSERIA MENINGITIDIS BY PCR: NOT DETECTED
NEUTROPHILS ABSOLUTE: 11.2 K/UL (ref 1.4–6.5)
NEUTROPHILS RELATIVE PERCENT: 76 %
O2 SAT, VEN: 75 %
PCO2, VEN: 25.1 MM HG (ref 40–50)
PDW BLD-RTO: 14.4 % (ref 11.5–14.5)
PERFORMED ON: ABNORMAL
PERFORMED ON: ABNORMAL
PH VENOUS: 7.2 (ref 7.32–7.42)
PLATELET # BLD: 286 K/UL (ref 130–400)
PLATELET SLIDE REVIEW: NORMAL
PO2, VEN: 47 MM HG
POC CHLORIDE: 107 MEQ/L (ref 99–110)
POC CREATININE: 4 MG/DL (ref 0.8–1.3)
POC HEMATOCRIT: 26 % (ref 41–53)
POC POTASSIUM: 6.3 MEQ/L (ref 3.5–5.1)
POC SAMPLE TYPE: ABNORMAL
POC SODIUM: 127 MEQ/L (ref 136–145)
POIKILOCYTES: ABNORMAL
POTASSIUM REFLEX MAGNESIUM: 5.4 MEQ/L (ref 3.4–4.9)
POTASSIUM REFLEX MAGNESIUM: 5.7 MEQ/L (ref 3.4–4.9)
POTASSIUM REFLEX MAGNESIUM: 6 MEQ/L (ref 3.4–4.9)
PRO-BNP: 4685 PG/ML
PROTEUS SPECIES BY PCR: DETECTED
PSEUDOMONAS AERUGINOSA BY PCR: NOT DETECTED
RBC # BLD: 2.73 M/UL (ref 4.7–6.1)
SALMONELLA SPECIES BY PCR: NOT DETECTED
SERRATIA MARCESCENS BY PCR: NOT DETECTED
SMUDGE CELLS: 9
SODIUM BLD-SCNC: 121 MEQ/L (ref 135–144)
SODIUM BLD-SCNC: 124 MEQ/L (ref 135–144)
SODIUM BLD-SCNC: 126 MEQ/L (ref 135–144)
STAPHYLOCOCCUS AUREUS BY PCR: NOT DETECTED
STAPHYLOCOCCUS EPIDERMIDIS BY PCR: NOT DETECTED
STAPHYLOCOCCUS LUGDUNENSIS BY PCR: NOT DETECTED
STAPHYLOCOCCUS SPECIES BY PCR: NOT DETECTED
STENOTROPHOMONAS MALTOPHILIA BY PCR: NOT DETECTED
STREPTOCOCCUS AGALACTIAE BY PCR: NOT DETECTED
STREPTOCOCCUS PNEUMONIAE BY PCR: NOT DETECTED
STREPTOCOCCUS PYOGENES  BY PCR: NOT DETECTED
STREPTOCOCCUS SPECIES BY PCR: NOT DETECTED
TCO2 CALC VENOUS: 11 MMOL/L
TOTAL PROTEIN: 8.3 G/DL (ref 6.3–8)
TROPONIN: 0.07 NG/ML (ref 0–0.01)
WBC # BLD: 14.6 K/UL (ref 4.8–10.8)

## 2022-06-12 PROCEDURE — 2580000003 HC RX 258: Performed by: NURSE PRACTITIONER

## 2022-06-12 PROCEDURE — 87150 DNA/RNA AMPLIFIED PROBE: CPT

## 2022-06-12 PROCEDURE — 83605 ASSAY OF LACTIC ACID: CPT

## 2022-06-12 PROCEDURE — 6360000002 HC RX W HCPCS: Performed by: NURSE PRACTITIONER

## 2022-06-12 PROCEDURE — 97167 OT EVAL HIGH COMPLEX 60 MIN: CPT

## 2022-06-12 PROCEDURE — 2580000003 HC RX 258: Performed by: EMERGENCY MEDICINE

## 2022-06-12 PROCEDURE — 36415 COLL VENOUS BLD VENIPUNCTURE: CPT

## 2022-06-12 PROCEDURE — 6370000000 HC RX 637 (ALT 250 FOR IP): Performed by: NURSE PRACTITIONER

## 2022-06-12 PROCEDURE — 82803 BLOOD GASES ANY COMBINATION: CPT

## 2022-06-12 PROCEDURE — 87186 SC STD MICRODIL/AGAR DIL: CPT

## 2022-06-12 PROCEDURE — 82330 ASSAY OF CALCIUM: CPT

## 2022-06-12 PROCEDURE — 80048 BASIC METABOLIC PNL TOTAL CA: CPT

## 2022-06-12 PROCEDURE — 2580000003 HC RX 258: Performed by: STUDENT IN AN ORGANIZED HEALTH CARE EDUCATION/TRAINING PROGRAM

## 2022-06-12 PROCEDURE — 84132 ASSAY OF SERUM POTASSIUM: CPT

## 2022-06-12 PROCEDURE — 99285 EMERGENCY DEPT VISIT HI MDM: CPT

## 2022-06-12 PROCEDURE — 6370000000 HC RX 637 (ALT 250 FOR IP): Performed by: EMERGENCY MEDICINE

## 2022-06-12 PROCEDURE — 2580000003 HC RX 258: Performed by: INTERNAL MEDICINE

## 2022-06-12 PROCEDURE — 83930 ASSAY OF BLOOD OSMOLALITY: CPT

## 2022-06-12 PROCEDURE — 85014 HEMATOCRIT: CPT

## 2022-06-12 PROCEDURE — 82435 ASSAY OF BLOOD CHLORIDE: CPT

## 2022-06-12 PROCEDURE — 2500000003 HC RX 250 WO HCPCS: Performed by: STUDENT IN AN ORGANIZED HEALTH CARE EDUCATION/TRAINING PROGRAM

## 2022-06-12 PROCEDURE — 84295 ASSAY OF SERUM SODIUM: CPT

## 2022-06-12 PROCEDURE — 87077 CULTURE AEROBIC IDENTIFY: CPT

## 2022-06-12 PROCEDURE — 2060000000 HC ICU INTERMEDIATE R&B

## 2022-06-12 PROCEDURE — 87040 BLOOD CULTURE FOR BACTERIA: CPT

## 2022-06-12 PROCEDURE — 97163 PT EVAL HIGH COMPLEX 45 MIN: CPT

## 2022-06-12 PROCEDURE — 6360000002 HC RX W HCPCS: Performed by: INTERNAL MEDICINE

## 2022-06-12 PROCEDURE — 36600 WITHDRAWAL OF ARTERIAL BLOOD: CPT

## 2022-06-12 PROCEDURE — 82565 ASSAY OF CREATININE: CPT

## 2022-06-12 RX ORDER — SODIUM BICARBONATE 650 MG/1
1300 TABLET ORAL 3 TIMES DAILY
Status: DISCONTINUED | OUTPATIENT
Start: 2022-06-12 | End: 2022-06-12

## 2022-06-12 RX ORDER — ASPIRIN 81 MG/1
81 TABLET, CHEWABLE ORAL DAILY
Status: DISCONTINUED | OUTPATIENT
Start: 2022-06-12 | End: 2022-06-16 | Stop reason: HOSPADM

## 2022-06-12 RX ORDER — SODIUM CHLORIDE 9 MG/ML
INJECTION, SOLUTION INTRAVENOUS CONTINUOUS
Status: DISCONTINUED | OUTPATIENT
Start: 2022-06-12 | End: 2022-06-12 | Stop reason: ALTCHOICE

## 2022-06-12 RX ORDER — SODIUM CHLORIDE 0.9 % (FLUSH) 0.9 %
5-40 SYRINGE (ML) INJECTION PRN
Status: DISCONTINUED | OUTPATIENT
Start: 2022-06-12 | End: 2022-06-16 | Stop reason: HOSPADM

## 2022-06-12 RX ORDER — CHOLECALCIFEROL (VITAMIN D3) 125 MCG
500 CAPSULE ORAL DAILY
Status: DISCONTINUED | OUTPATIENT
Start: 2022-06-12 | End: 2022-06-16 | Stop reason: HOSPADM

## 2022-06-12 RX ORDER — LIDOCAINE HYDROCHLORIDE 20 MG/ML
JELLY TOPICAL ONCE
Status: COMPLETED | OUTPATIENT
Start: 2022-06-12 | End: 2022-06-12

## 2022-06-12 RX ORDER — POLYETHYLENE GLYCOL 3350 17 G/17G
17 POWDER, FOR SOLUTION ORAL DAILY PRN
Status: DISCONTINUED | OUTPATIENT
Start: 2022-06-12 | End: 2022-06-16 | Stop reason: HOSPADM

## 2022-06-12 RX ORDER — INSULIN GLARGINE 100 [IU]/ML
5 INJECTION, SOLUTION SUBCUTANEOUS NIGHTLY
Status: DISCONTINUED | OUTPATIENT
Start: 2022-06-12 | End: 2022-06-16 | Stop reason: HOSPADM

## 2022-06-12 RX ORDER — SODIUM CHLORIDE 9 MG/ML
INJECTION, SOLUTION INTRAVENOUS PRN
Status: DISCONTINUED | OUTPATIENT
Start: 2022-06-12 | End: 2022-06-16 | Stop reason: HOSPADM

## 2022-06-12 RX ORDER — HEPARIN SODIUM 5000 [USP'U]/ML
5000 INJECTION, SOLUTION INTRAVENOUS; SUBCUTANEOUS 3 TIMES DAILY
Status: DISCONTINUED | OUTPATIENT
Start: 2022-06-12 | End: 2022-06-16 | Stop reason: HOSPADM

## 2022-06-12 RX ORDER — ONDANSETRON 4 MG/1
4 TABLET, ORALLY DISINTEGRATING ORAL EVERY 8 HOURS PRN
Status: DISCONTINUED | OUTPATIENT
Start: 2022-06-12 | End: 2022-06-16 | Stop reason: HOSPADM

## 2022-06-12 RX ORDER — 0.9 % SODIUM CHLORIDE 0.9 %
1000 INTRAVENOUS SOLUTION INTRAVENOUS ONCE
Status: COMPLETED | OUTPATIENT
Start: 2022-06-12 | End: 2022-06-12

## 2022-06-12 RX ORDER — ASPIRIN 81 MG/1
324 TABLET, CHEWABLE ORAL ONCE
Status: COMPLETED | OUTPATIENT
Start: 2022-06-12 | End: 2022-06-12

## 2022-06-12 RX ORDER — SODIUM CHLORIDE 9 MG/ML
INJECTION, SOLUTION INTRAVENOUS CONTINUOUS
Status: DISCONTINUED | OUTPATIENT
Start: 2022-06-12 | End: 2022-06-12

## 2022-06-12 RX ORDER — ONDANSETRON 2 MG/ML
4 INJECTION INTRAMUSCULAR; INTRAVENOUS EVERY 6 HOURS PRN
Status: DISCONTINUED | OUTPATIENT
Start: 2022-06-12 | End: 2022-06-16 | Stop reason: HOSPADM

## 2022-06-12 RX ORDER — ACETAMINOPHEN 650 MG/1
650 SUPPOSITORY RECTAL EVERY 6 HOURS PRN
Status: DISCONTINUED | OUTPATIENT
Start: 2022-06-12 | End: 2022-06-16 | Stop reason: HOSPADM

## 2022-06-12 RX ORDER — ACETAMINOPHEN 325 MG/1
650 TABLET ORAL EVERY 6 HOURS PRN
Status: DISCONTINUED | OUTPATIENT
Start: 2022-06-12 | End: 2022-06-16 | Stop reason: HOSPADM

## 2022-06-12 RX ORDER — SODIUM CHLORIDE 0.9 % (FLUSH) 0.9 %
5-40 SYRINGE (ML) INJECTION EVERY 12 HOURS SCHEDULED
Status: DISCONTINUED | OUTPATIENT
Start: 2022-06-12 | End: 2022-06-16 | Stop reason: HOSPADM

## 2022-06-12 RX ADMIN — ASPIRIN 324 MG: 81 TABLET, CHEWABLE ORAL at 00:54

## 2022-06-12 RX ADMIN — SODIUM CHLORIDE: 9 INJECTION, SOLUTION INTRAVENOUS at 04:26

## 2022-06-12 RX ADMIN — SODIUM ZIRCONIUM CYCLOSILICATE 10 G: 10 POWDER, FOR SUSPENSION ORAL at 15:43

## 2022-06-12 RX ADMIN — HEPARIN SODIUM 5000 UNITS: 5000 INJECTION INTRAVENOUS; SUBCUTANEOUS at 15:42

## 2022-06-12 RX ADMIN — SODIUM CHLORIDE 1000 ML: 9 INJECTION, SOLUTION INTRAVENOUS at 00:10

## 2022-06-12 RX ADMIN — CYANOCOBALAMIN TAB 500 MCG 500 MCG: 500 TAB at 08:22

## 2022-06-12 RX ADMIN — SODIUM ZIRCONIUM CYCLOSILICATE 10 G: 10 POWDER, FOR SUSPENSION ORAL at 21:14

## 2022-06-12 RX ADMIN — MEROPENEM 500 MG: 500 INJECTION, POWDER, FOR SOLUTION INTRAVENOUS at 18:46

## 2022-06-12 RX ADMIN — ASPIRIN 81 MG: 81 TABLET, CHEWABLE ORAL at 08:22

## 2022-06-12 RX ADMIN — HEPARIN SODIUM 5000 UNITS: 5000 INJECTION INTRAVENOUS; SUBCUTANEOUS at 08:22

## 2022-06-12 RX ADMIN — INSULIN GLARGINE 5 UNITS: 100 INJECTION, SOLUTION SUBCUTANEOUS at 21:23

## 2022-06-12 RX ADMIN — HEPARIN SODIUM 5000 UNITS: 5000 INJECTION INTRAVENOUS; SUBCUTANEOUS at 21:12

## 2022-06-12 RX ADMIN — SODIUM CHLORIDE, PRESERVATIVE FREE 5 ML: 5 INJECTION INTRAVENOUS at 21:20

## 2022-06-12 RX ADMIN — ONDANSETRON 4 MG: 2 INJECTION INTRAMUSCULAR; INTRAVENOUS at 14:27

## 2022-06-12 RX ADMIN — SODIUM CHLORIDE, PRESERVATIVE FREE 10 ML: 5 INJECTION INTRAVENOUS at 08:22

## 2022-06-12 RX ADMIN — ONDANSETRON 4 MG: 2 INJECTION INTRAMUSCULAR; INTRAVENOUS at 21:09

## 2022-06-12 RX ADMIN — SODIUM BICARBONATE 1300 MG: 650 TABLET ORAL at 08:22

## 2022-06-12 RX ADMIN — ACETAMINOPHEN 650 MG: 325 TABLET ORAL at 21:06

## 2022-06-12 RX ADMIN — LIDOCAINE HYDROCHLORIDE: 20 JELLY TOPICAL at 02:04

## 2022-06-12 RX ADMIN — CALCIUM GLUCONATE 1000 MG: 98 INJECTION, SOLUTION INTRAVENOUS at 06:49

## 2022-06-12 RX ADMIN — SODIUM CHLORIDE: 9 INJECTION, SOLUTION INTRAVENOUS at 01:00

## 2022-06-12 RX ADMIN — SODIUM ZIRCONIUM CYCLOSILICATE 10 G: 10 POWDER, FOR SUSPENSION ORAL at 08:22

## 2022-06-12 RX ADMIN — SODIUM BICARBONATE: 84 INJECTION, SOLUTION INTRAVENOUS at 15:42

## 2022-06-12 ASSESSMENT — PAIN - FUNCTIONAL ASSESSMENT: PAIN_FUNCTIONAL_ASSESSMENT: NONE - DENIES PAIN

## 2022-06-12 NOTE — PROGRESS NOTES
Department of Internal Medicine  General Internal Medicine  Attending Progress Note      SUBJECTIVE:  Pt seen and examined. Hard of hearing. No complaints    OBJECTIVE      Medications    Current Facility-Administered Medications: aspirin chewable tablet 81 mg, 81 mg, Oral, Daily  insulin glargine (LANTUS) injection vial 5 Units, 5 Units, SubCUTAneous, Nightly  vitamin B-12 (CYANOCOBALAMIN) tablet 500 mcg, 500 mcg, Oral, Daily  sodium chloride flush 0.9 % injection 5-40 mL, 5-40 mL, IntraVENous, 2 times per day  sodium chloride flush 0.9 % injection 5-40 mL, 5-40 mL, IntraVENous, PRN  0.9 % sodium chloride infusion, , IntraVENous, PRN  heparin (porcine) injection 5,000 Units, 5,000 Units, SubCUTAneous, TID  ondansetron (ZOFRAN-ODT) disintegrating tablet 4 mg, 4 mg, Oral, Q8H PRN **OR** ondansetron (ZOFRAN) injection 4 mg, 4 mg, IntraVENous, Q6H PRN  polyethylene glycol (GLYCOLAX) packet 17 g, 17 g, Oral, Daily PRN  acetaminophen (TYLENOL) tablet 650 mg, 650 mg, Oral, Q6H PRN **OR** acetaminophen (TYLENOL) suppository 650 mg, 650 mg, Rectal, Q6H PRN  sodium zirconium cyclosilicate (LOKELMA) oral suspension 10 g, 10 g, Oral, TID  sodium bicarbonate 150 mEq in dextrose 5 % 1,000 mL infusion, , IntraVENous, Continuous  Physical    VITALS:  BP (!) 123/52   Pulse 69   Temp 97.5 °F (36.4 °C)   Resp 20   Ht 5' 11\" (1.803 m)   Wt 169 lb 3.2 oz (76.7 kg)   SpO2 100%   BMI 23.60 kg/m²   Constitutional: Awake and alert in no acute distress.  Lying in bed comfortably, hard of hearing  Head: Normocephalic, atraumatic  Eyes: EOMI, PERRLA  ENT: dry mucous membranes  Neck: neck supple, trachea midline  Lungs: Good inspiratory effort, no wheeze, no rhonchi, no rales  Heart: RRR, normal S1 and S2  GI: Soft, non-distended, non tender, no guarding, no rebound, +BS  MSK: no edema noted  Skin: warm, dry     Data    CBC:   Lab Results   Component Value Date    WBC 14.6 06/11/2022    RBC 2.73 06/11/2022    HGB 8.8 06/12/2022    HCT 26.2 06/11/2022    MCV 95.6 06/11/2022    MCH 30.4 06/11/2022    MCHC 31.7 06/11/2022    RDW 14.4 06/11/2022     06/11/2022    MPV 7.0 01/08/2021     CMP:    Lab Results   Component Value Date     06/12/2022    K 5.4 06/12/2022     06/12/2022    CO2 12 06/12/2022     06/12/2022    CREATININE 4.12 06/12/2022    GFRAA 16.6 06/12/2022    LABGLOM 13.7 06/12/2022    GLUCOSE 266 06/12/2022    PROT 8.3 06/11/2022    LABALBU 3.0 06/11/2022    CALCIUM 9.1 06/12/2022    BILITOT 0.3 06/11/2022    ALKPHOS 116 06/11/2022    AST 13 06/11/2022    ALT 7 06/11/2022       ASSESSMENT AND PLAN      # Syncope likely orthostatic in the setting of dehydration  - pt with hyponatremia and appears dehydrated with dry mucous membranes  - hydrating with IVF  - pt is current with hospice at home. Hospice consulted  - DNR-CCA  - PT/OT ordered on admission. [id number] [address] for therapy while admitted, but doubt that continuing therapy outside of hospital is consistent with pt's wishes for comfort measures and would recommend resuming home hospice services as pt unlikely to regain strength and should focus more on quality of life    # PAULINO on CKD4  - monitoring with hydration  - nephrology consulted - changed fluids to bicarb  - monitor electrolytes    # Leukocytosis  - likely due to dehydration as no active signs of infection    # Anion gap metabolic acidosis  - due to PAULINO  - on bicarb drip  - nephrology consulted    # DM2  - cont current regimen    Disposition: Pt with progressive weakness and dehydration resulting in a syncopal episode. Will continue to hydrate and monitor renal function. Agree with resuming hospice at discharge as therapy not consistent with comfort care and pt is a poor candidate for rehab/SNF given his age and comorbidities.  Hospice consulted      Alyssa Bryan DO  Internal Medicine   Hospitalist    >35 minutes in total care time

## 2022-06-12 NOTE — ED TRIAGE NOTES
Pt brought in by [facility] c/o AMS. Per [facility] pt was called for lift assist and when they got there pt was very weak and had an episode of syncope. Pt was unable to answer questions or follow commands per [facility]. Pt is now A&Ox3. Pt is hard of hearing. [facility] denied pt falling. [facility] reported .

## 2022-06-12 NOTE — ED PROVIDER NOTES
[address] [facility] ED  EMERGENCY DEPARTMENT ENCOUNTER      Pt Name: Elijah Alexis  MRN: [id number]  [place] 2/28/1932  Date of evaluation: 6/11/2022  Provider: Mireya Milian MD    CHIEF COMPLAINT       Chief Complaint   Patient presents with    Altered Mental Status         HISTORY OF PRESENT ILLNESS   (Location/Symptom, Timing/Onset, Context/Setting, Quality, Duration, Modifying Factors, Severity)  Note limiting factors. Elijah Alexis is a 80 y.o. male who presents to the emergency department for evaluation status post fall. History of diabetes, hyponatremia, paroxysmal A. fib aspirin use, hypertension, Alzheimer's, frequent falls, CKD, obstructive uropathy. They were called per EMS to the house for a lift assist, and they sat the patient in a chair and it appeared that he had a syncopal event and for a moment was not responsive. When he first arrived the patient was telling them that he was too weak to walk and after this he seemed more quiet. He presents emergency department hard of hearing however awake and alert, following commands. He denies any acute complaint but it is difficult to get a history secondary to his difficulty with hearing. No other family here for further history. HPI    Nursing Notes were reviewed. REVIEW OF SYSTEMS    (2-9 systems for level 4, 10 or more for level 5)     Review of Systems   Unable to perform ROS: Dementia       Except as noted above the remainder of the review of systems was reviewed and negative.        PAST MEDICAL HISTORY     Past Medical History:   Diagnosis Date    Acute metabolic encephalopathy     Anemia in CKD (chronic kidney disease)     Blind right eye     Chronic hyponatremia     CKD (chronic kidney disease) stage 4, GFR 15-29 ml/min ([facility])     Diabetes mellitus ([facility] [address]     Hypertension     Kidney stone     Klebsiella pneumoniae sepsis (HCC)     Mixed hyperlipidemia     Obstructive uropathy     Paroxysmal A-fib (HCC)     Pneumonia  Sacral ulcer ([facility] [address]     Urethral stricture     UTI due to Klebsiella species          SURGICAL HISTORY       Past Surgical History:   Procedure Laterality Date    EYE SURGERY      KIDNEY STONE SURGERY  1995         CURRENT MEDICATIONS       Previous Medications    ALFUZOSIN (UROXATRAL) 10 MG EXTENDED RELEASE TABLET    Take 1 tablet by mouth daily (with breakfast)    ASPIRIN 81 MG TABLET    Take 81 mg by mouth daily    INSULIN GLARGINE (LANTUS SOLOSTAR) 100 UNIT/ML INJECTION PEN    Inject 10 Units into the skin nightly    INSULIN LISPRO (HUMALOG KWIKPEN) 100 UNIT/ML PEN    Glucose 100-120 no insulin, 121-140 2 units, 141-160 4 units, 161- 180 6 units, 181-200 8 units, 200 or higher 10 units    SODIUM BICARBONATE 650 MG TABLET    Take 2 tablets by mouth 3 times daily    VITAMIN B-12 (CYANOCOBALAMIN) 500 MCG TABLET    Take 500 mcg by mouth daily       ALLERGIES     Pcn [penicillins] and Hctz [hydrochlorothiazide]    FAMILY HISTORY     History reviewed. No pertinent family history. SOCIAL HISTORY       Social History     Socioeconomic History    Marital status:      Spouse name: None    Number of children: None    Years of education: None    Highest education level: None   Occupational History    None   Tobacco Use    Smoking status: Former Smoker    Smokeless tobacco: Never Used   Vaping Use    Vaping Use: Never used   Substance and Sexual Activity    Alcohol use: Not Currently    Drug use: Never    Sexual activity: None   Other Topics Concern    None   Social History Narrative    None     Social Determinants of Health     Financial Resource Strain:     Difficulty of Paying Living Expenses: Not on file   Food Insecurity:     Worried About Running Out of Food in the Last Year: Not on file    Arthur of Food in the Last Year: Not on file   Transportation Needs:     Lack of Transportation (Medical): Not on file    Lack of Transportation (Non-Medical):  Not on file   Physical Activity:  Days of Exercise per Week: Not on file    Minutes of Exercise per Session: Not on file   Stress:     Feeling of Stress : Not on file   Social Connections:     Frequency of Communication with Friends and Family: Not on file    Frequency of Social Gatherings with Friends and Family: Not on file    Attends Zoroastrian Services: Not on file    Active Member of [address] [place] [address] [employer] or Organizations: Not on file    Attends Club or Organization Meetings: Not on file    Marital Status: Not on file   Intimate Partner Violence:     Fear of Current or Ex-Partner: Not on file    Emotionally Abused: Not on file    Physically Abused: Not on file    Sexually Abused: Not on file   Housing Stability:     Unable to Pay for Housing in the Last Year: Not on file    Number of [place] in the Last Year: Not on file    Unstable Housing in the Last Year: Not on file       SCREENINGS   NIH Stroke Scale  Interval: Baseline  NIH Stroke Scale All Negative: Yes  Level of Consciousness (1a): Alert  LOC Questions (1b): Answers both correctly  LOC Commands (1c): Performs both tasks correctly  Best Gaze (2): Normal  Visual (3): No visual loss  Facial Palsy (4): Normal symmetrical movement  Motor Arm, Left (5a): No drift  Motor Arm, Right (5b): No drift  Motor Leg, Left (6a): No drift  Motor Leg, Right (6b):  No drift  Limb Ataxia (7): Absent  Sensory (8): Normal  Best Language (9): No aphasia  Dysarthria (10): Normal  Extinction and Inattention (11): No abnormality  Total: 0     All Coma Scale  Eye Opening: Spontaneous  Best Verbal Response: Oriented  Best Motor Response: Obeys commands  [place] Coma Scale Score: 15                     CIWA Assessment  BP: (!) 104/46  Heart Rate: 71                 PHYSICAL EXAM    (up to 7 for level 4, 8 or more for level 5)     ED Triage Vitals [06/11/22 2301]   BP Temp Temp src Heart Rate Resp SpO2 Height Weight   (!) 106/49 -- -- 91 16 97 % 5' 11\" (1.803 m) 170 lb (77.1 kg)       Physical Exam  Constitutional:       General: He is not in acute distress. Appearance: He is not diaphoretic. Comments: No overt signs of head injury, no subconjunctival hemorrhage, no nasal septal Brooke, no midline vertebral tenderness or step-off. No reproducible chest wall or abdominal tenderness. Pelvis stable. No upper extremity pronator drift. Equal strength and sensation. HENT:      Head: Normocephalic and atraumatic. Nose: Nose normal.      Mouth/Throat:      Mouth: Mucous membranes are dry. Eyes:      Extraocular Movements: Extraocular movements intact. Pupils: Pupils are equal, round, and reactive to light. Comments: No facial droop   Cardiovascular:      Rate and Rhythm: Normal rate and regular rhythm. Pulses: Normal pulses. Pulmonary:      Effort: Pulmonary effort is normal. No respiratory distress. Breath sounds: Normal breath sounds. No wheezing. Abdominal:      Tenderness: There is no abdominal tenderness. There is no guarding or rebound. Musculoskeletal:         General: No deformity. Cervical back: Normal range of motion. No tenderness. Right lower leg: No edema. Left lower leg: No edema. Skin:     Capillary Refill: Capillary refill takes less than 2 seconds. Findings: No rash. Neurological:      General: No focal deficit present. Mental Status: He is alert and oriented to person, place, and time. Cranial Nerves: No cranial nerve deficit. Sensory: No sensory deficit. Motor: No weakness.          DIAGNOSTIC RESULTS     EKG: All EKG's are interpreted by the Emergency Department Physician who either signs or Co-signs this chart in the absence of a cardiologist.    Normal sinus rhythm, rate 81, , right bundle branch block, no STEMI    RADIOLOGY:   Non-plain film images such as CT, Ultrasound and MRI are read by the radiologist. Plain radiographic images are visualized and preliminarily interpreted by the emergency physician with the below findings:    No pneumothorax or focal consolidation    Interpretation per the Radiologist below, if available at the time of this note:    No acute pathology noted on the CT image of the head    CT HEAD WO CONTRAST   Final Result   Atrophy. There is encephalomalacia in the left frontal lobe. Findings appears similar to previous examination. CT CERVICAL SPINE WO CONTRAST   Final Result   Straightening of the normal cervical lordosis. Hypertrophic degenerative changes. If further evaluation is clinically necessary, consider correlation with    MRI. XR CHEST PORTABLE    (Results Pending)         ED BEDSIDE ULTRASOUND:   Performed by ED Physician - none    LABS:  Labs Reviewed   CBC WITH AUTO DIFFERENTIAL - Abnormal; Notable for the following components:       Result Value    WBC 14.6 (*)     RBC 2.73 (*)     Hemoglobin 8.3 (*)     Hematocrit 26.2 (*)     MCHC 31.7 (*)     Neutrophils Absolute 11.2 (*)     All other components within normal limits   BASIC METABOLIC PANEL W/ REFLEX TO MG FOR LOW K - Abnormal; Notable for the following components:    Sodium 124 (*)     Potassium reflex Magnesium 6.0 (*)     CO2 11 (*)     Glucose 293 (*)      (*)     CREATININE 4.04 (*)     GFR Non- 14.0 (*)     GFR  16.9 (*)     All other components within normal limits    Narrative:     Margarita Blocker tel. [id number],  Chemistry results called to and read back by JOSE SEVERINO,, 06/12/2022  00:01, by [facility] - Abnormal; Notable for the following components:     Total Protein 8.3 (*)     Albumin 3.0 (*)     Alkaline Phosphatase 116 (*)     All other components within normal limits    Narrative:     Margarita Blocker tel. [phone],  Chemistry results called to and read back by JOSE SEVERINO,, 06/12/2022  00:01, by Vishnu Ordoñez   TROPONIN - Abnormal; Notable for the following components:    Troponin 0.065 (*)     All other components within normal limits    Narrative:     Jorge Choe tel. [phone],  Chemistry results called to and read back by JOSE SEVERINO, 06/12/2022 00:02,  by [facility]  Chemistry results called to and read back by JOSE SEVERINO,, 06/12/2022  00:01, by [facility]   LIPASE - Abnormal; Notable for the following components:    Lipase 213 (*)     All other components within normal limits    Narrative:     Jorge Choe tel. [phone],  Chemistry results called to and read back by JOSE SEVERINO,, 06/12/2022  00:01, by [facility]   POCT VENOUS - Abnormal; Notable for the following components:    POC Sodium 127 (*)     POC Potassium 6.3 (*)     POC Glucose 294 (*)     POC Creatinine 4.0 (*)     GFR Non- 14 (*)     GFR  17 (*)     pH, Malcolm 7.200 (*)     pCO2, Malcolm 25.1 (*)     HCO3, Venous 9.8 (*)     Base Excess, Malcolm -18 (*)     Lactate 2.17 (*)     Hemoglobin 8.8 (*)     POC Hematocrit 26 (*)     All other components within normal limits   CULTURE, BLOOD 1   CULTURE, BLOOD 2   BRAIN NATRIURETIC PEPTIDE    Narrative:     Jorge Choe tel. [phone],  Chemistry results called to and read back by JOSE SEVERINO, 06/12/2022 00:02,  by [facility]  Chemistry results called to and read back by JOSE SEVERINO,, 06/12/2022  00:01, by [facility]   LACTATE, SEPSIS   URINALYSIS WITH REFLEX TO CULTURE   LACTATE, SEPSIS   POCT EPOC BLOOD GAS, LACTIC ACID, ICA       All other labs were within normal range or not returned as of this dictation. EMERGENCY DEPARTMENT COURSE and DIFFERENTIAL DIAGNOSIS/MDM:   Vitals:    Vitals:    06/12/22 0000 06/12/22 0013 06/12/22 0015 06/12/22 0030   BP: (!) 105/41 (!) 106/46 (!) 106/46 (!) 104/46   Pulse: 76 73 72 71   Resp: 22 21 20 19   SpO2: 99% 98% 99% 98%   Weight:       Height:           Chronic anemia. Slight leukocytosis. Hyponatremia. Hyperkalemia, PAULINO on CKD and significant acidemia.   Elevated troponin similar to previous most likely secondary to renal insufficiency. Hyperglycemia without anion gap, electrolyte derangements possibly secondary to his chronic renal failure rather than DKA-no precipitating factor  MDM  The patient's wife who is his medical decision-maker is at bedside, she voices their goals of care include medical management but they do not wish to have chest compressions, intubation, central line or other invasive procedures. I reached out to nephrology on-call Dr. Lazarus [facility] who recommends isotonic maintenance fluids and admission to the hospitalist given multiple abnormalities on lab findings    REASSESSMENT        CONSULTS:  None    PROCEDURES:  Unless otherwise noted below, none     Procedures      FINAL IMPRESSION      1. Hyponatremia    2. Hyperkalemia    3. Acute kidney injury superimposed on CKD (HCC)    4. Elevated troponin    5. Uremia    6. Obstructive uropathy          DISPOSITION/PLAN   DISPOSITION        PATIENT REFERRED TO:  No follow-up provider specified. DISCHARGE MEDICATIONS:  New Prescriptions    No medications on file     Controlled Substances Monitoring:     No flowsheet data found.     (Please note that portions of this note were completed with a voice recognition program.  Efforts were made to edit the dictations but occasionally words are mis-transcribed.)    Dane Payan MD (electronically signed)  Attending Emergency Physician           Dane Payan MD  06/12/22 0127

## 2022-06-12 NOTE — ED NOTES
Lopez catheter attempted by Dr. Trevor Carlton. Unsuccessful.       Abi Santos, RN  06/12/22 8570 yes

## 2022-06-12 NOTE — PROGRESS NOTES
Physical Therapy Med Surg Initial Assessment  Facility/Department: [address] [place] [address]  Room: [id number]       NAME: Va Nolan  : 1932 (80 y.o.)  MRN: [id number]  CODE STATUS: DNR-CCA    Date of Service: 2022    Patient Diagnosis(es): Hyperkalemia [E87.5]  Hyponatremia [E87.1]  Uremia [N19]  Obstructive uropathy [N13.9]  Elevated troponin [R77.8]  Acute kidney injury superimposed on CKD ([address] [N17.9, N18.9]   Chief Complaint   Patient presents with    Altered Mental Status     Patient Active Problem List    Diagnosis Date Noted    Syncope and collapse 2022    Hydronephrosis     General weakness 2020    Chronic kidney disease, stage IV (severe) ([address] 2020    Anemia of chronic renal failure 2020    Ataxic gait     Dementia due to Alzheimer's disease ([address]     Bradycardia 10/03/2019    Fall at home 10/03/2019    Hyponatremia 10/03/2019    Hyperkalemia 10/03/2019    Hypotension 10/03/2019    Paroxysmal A-fib (HCC)     Hypertension     Anemia in CKD (chronic kidney disease)     Gram negative sepsis (HCC)     Klebsiella pneumoniae sepsis (HCC)     Uncontrolled type 2 diabetes mellitus with hyperglycemia ([address]     Pneumonia 2019        Past Medical History:   Diagnosis Date    Acute metabolic encephalopathy     Anemia in CKD (chronic kidney disease)     Blind right eye     Chronic hyponatremia     CKD (chronic kidney disease) stage 4, GFR 15-29 ml/min ([facility])     Diabetes mellitus ([address]     Hypertension     Kidney stone     Klebsiella pneumoniae sepsis (HCC)     Mixed hyperlipidemia     Obstructive uropathy     Paroxysmal A-fib (HCC)     Pneumonia     Sacral ulcer ([address]     Urethral stricture     UTI due to Klebsiella species      Past Surgical History:   Procedure Laterality Date   [address]       Patient assessed for rehabilitation services?: Yes  Family / Caregiver Present: No    Restrictions:  Restrictions/Precautions: Fall Risk,Contact Precautions (high lopez score; MRSA)     SUBJECTIVE:   Subjective: \"I am weak\"    Pain  Pain: 0/10    Prior Level of Function:  Social/Functional History  Lives With: Spouse  Type of Home: Condo  Home Layout: Two level,Bed/Bath upstairs,1/2 bath on main level  Home Access: Level entry  Bathroom Shower/Tub: Tub/Shower unit  Bathroom Equipment: Shower chair  Home Equipment: Walker, rolling  ADL Assistance: Needs assistance (indep dressing; assistance of HH for showering 1x/wk)  Homemaking Assistance: Needs assistance (assists wife with homemaking tasks)  Homemaking Responsibilities: Yes  Ambulation Assistance: Independent [employer])  Transfer Assistance: Independent  Active : No  Patient's  Info: family  Additional Comments: pt is questionable historian; pt sleeps on couch    OBJECTIVE:   Vision  Vision: Impaired  Vision Exceptions: Wears glasses at all times  Hearing: Exceptions to [facility]  Hearing Exceptions: Hard of hearing/hearing concerns    Cognition:  Overall Orientation Status: Within Functional Limits  Follows Commands: Impaired (follows 1 step instructions)  Overall Cognitive Status: Exceptions    Observation/Palpation  Posture: Poor  Observation: No acute distress noted, patient pleasant and cooperative on evaluation    ROM:  RLE AROM: WFL  LLE AROM : WFL    Strength:  Strength RLE  Comment: grossly 3/5  Strength LLE  Comment: grossly 3/5  Strength Other  Other: core strength 2/5    Neuro:  Balance  Sitting - Static: Fair (R lateral lean requiring intermittent CGA)  Sitting - Dynamic: Fair;- (min A during LE movement)  Standing - Static: Poor;- (unable to reach full standing position with TD assistance)  Standing - Dynamic:  (unsafe to assess)    Sensation: Intact    Bed mobility  Supine to Sit: Dependent/Total  Sit to Supine: Dependent/Total  Bed Mobility Comments: able to initiate transfer by moving LEs to edge of bed; HOB elevated    Transfers  Sit to Stand: Dependent/Total  Stand to sit: Dependent/Total  Comment: PT assisting in front of pt; pt attempting to use UEs on PT's UE; unable to reach full standing position maintained knee flexion and trunk flexion; bearing minimal weight through B LEs during attempt at standing    Ambulation  Comments: unsafe to assess due to high level of assistance for sit to stand and inability to reach full standing position    Stairs/Curb  Stairs?: No    Activity Tolerance  Activity Tolerance: Patient limited by fatigue;Patient limited by endurance  Activity Tolerance Comments: pt limited by strength and balance deficits    Patient Education  Education Given To: Patient  Education Provided: Role of Therapy;Plan of Care (general safety)  Education Method: Verbal  Education Outcome: Continued education needed       ASSESSMENT:   Body Structures, Functions, Activity Limitations Requiring Skilled Therapeutic Intervention: Decreased functional mobility ; Decreased strength;Decreased endurance;Decreased balance;Decreased posture  Decision Making: High Complexity  History: high  Exam: high  Clinical Presentation: high    Therapy Prognosis: Fair;Good    DISCHARGE RECOMMENDATIONS:  Discharge Recommendations: Continue to assess pending progress    Assessment: Pt is 80 y.o. male with acute kidney injury and MRSA. Pt exhibits decreased strength, balance, and activity tolerance requiring a high level of assistance for all mobility. Continued PT is indicated for decreased caregiver burden upon return home.   Requires PT Follow-Up: Yes      PLAN OF CARE:  Plan  Plan: 1 time a day 3-6 times a week  Current Treatment Recommendations: Strengthening,ROM,Balance training,Functional mobility training,Transfer training,Endurance training,Gait training,Stair training,Neuromuscular re-education,Manual Therapy - Soft Tissue Mobilization,Home exercise program,Safety education & training,Patient/Caregiver education & training,Equipment evaluation, education, & procurement,Positioning,[employer]    Safety Devices  Type of Devices: Bed alarm in place,Call light within reach,Left in bed    Goals:  Short Term Goals  Short term goal 1: Pt will demonstrate HEP CGA  Long Term Goals  Long term goal 1: Pt will demonstrate bed mobility CGA  Long term goal 2: Pt will demonstrate transfers CGA with safest AD  Long term goal 3: Pt will demonstrate amb >/= 10ft CGA with safest AD    AMPAC (6 CLICK) BASIC MOBILITY  AM-PAC Inpatient Mobility Raw Score : 6     Therapy Time:   Individual   Time In 0940   Time Out 0955   Minutes [employer] [address] [place] [address], 06/12/22 at 10:42 AM         Definitions for assistance levels  Independent = pt does not require any physical supervision or assistance from another person for activity completion. Device may be needed.   Stand by assistance = pt requires verbal cues or instructions from another person, close to but not touching, to perform the activity  Minimal assistance= pt performs 75% or more of the activity; assistance is required to complete the activity  Moderate assistance= pt performs 50% of the activity; assistance is required to complete the activity  Maximal assistance = pt performs 25% of the activity; assistance is required to complete the activity  Dependent = pt requires total physical assistance to accomplish the task

## 2022-06-12 NOTE — CARE COORDINATION
[facility] Case Management Initial Discharge Assessment    Met with Dtr  to discuss discharge plan. PCP: Nori Cary MD                                Date of Last Visit: Last year     VA Patient: Yes        VA Notified: no    If no PCP, list provided? N/A    Discharge Planning    Living Arrangements: at home dependent on family care    Who do you live with? Wife     Who helps you with your care:  self or spouse    If lives at home:     Do you have any barriers navigating in your home? yes - Weak and slow uses walker     Patient can perform ADL? No    Current Services (outpatient and in home) :  [facility])    Dialysis: No    Is transportation available to get to your appointments? Yes  - Dtr helps with driving    DME Equipment:  yes - Walker,cane    Respiratory equipment: None    Respiratory provider:  no     Ph[facility]  [facility] [address] [facility]?  No      Patient agreeable to [facility]? Declined    Patient agreeable to SNF/Rehab? Declined    Other discharge needs identified? Hospice    Does Patient Have a High-Risk for Readmission Diagnosis (CHF, PN, MI, COPD)? No   Initial Discharge Plan? (Note: please see concurrent daily documentation for any updates after initial note). Spoke to dtr Alonzo Trent re: plan and he is from home w wife and has [facility] . The doctor did order hospice. The family would like his hospice not another and I let them know we would call them. Pt also has PT/OT ordered and I did discuss w dtr as she states he is weak about rehab/snf being that he is in hospice. Dtr wants to wait to speak to doctor. I also reviewed IMM with her.      Readmission Risk              Risk of Unplanned Readmission:  18         Electronically signed by Marissa Luke on 6/12/2022 at 9:35 AM

## 2022-06-12 NOTE — ED NOTES
Lab at bedside,  Daughter ad spouse present. Per family, pt has had increased weakness since Monday. He woke up to use the restroom in the middle of the night and could not get up from the toilet. Uses walker for ambulation. Has been under hospice care x3 weeks for stage 5 kidney disease, per wife. Pt is A/O x3 at baseline. No change in mental status at this time.       Amanda Osborn RN  06/12/22 0023

## 2022-06-12 NOTE — H&P
[facility]  MEDICINE    HISTORY AND PHYSICAL EXAM    PATIENT NAME:  Raquel Rivera    MRN:  [id number]  SERVICE DATE:  6/12/2022   SERVICE TIME:  4:00 AM    Primary Care Physician: Luana Rodriguez MD         SUBJECTIVE  CHIEF COMPLAINT:  Altered Mental Status       HPI:     H&P is being dictated by Dr. Efra Leong under the NP's credentials as I am not able to log into epic    Patient is a 80-year-old male with multiple medical problems including BPH/type 2 diabetes mellitus who was brought into the hospital via EMS after they were initially called due to left assist however after the prom in the chair the patient went unresponsive for 30 seconds to 1 minute when he came to fluids patient was brought in here, here in the hospital patient was found to have hyponatremia/hyperkalemia/acute on chronic kidney disease the patient does not really recall the incident however the report was gathered by the wife at bedside who stated that he has been getting progressively weak to the point that he needs help with his daily activities. The patient is DNR CCA without intubation. PAST MEDICAL HISTORY:    Past Medical History:   Diagnosis Date    Acute metabolic encephalopathy     Anemia in CKD (chronic kidney disease)     Blind right eye     Chronic hyponatremia     CKD (chronic kidney disease) stage 4, GFR 15-29 ml/min ([facility])     Diabetes mellitus ([address]     Hypertension     Kidney stone     Klebsiella pneumoniae sepsis ([facility])     Mixed hyperlipidemia     Obstructive uropathy     Paroxysmal A-fib ([facility])     Pneumonia     Sacral ulcer ([address]     Urethral stricture     UTI due to Klebsiella species      PAST SURGICAL HISTORY:    Past Surgical History:   Procedure Laterality Date    EYE SURGERY      KIDNEY STONE SURGERY  1995     FAMILY HISTORY:  History reviewed. No pertinent family history.   SOCIAL HISTORY:    Social History     Socioeconomic History    Marital status:      Spouse name: Not on file    Jose Oropeza MD   alfuzosin (UROXATRAL) 10 MG extended release tablet Take 1 tablet by mouth daily (with breakfast) 12/24/20   Christine Mejía MD   aspirin 81 MG tablet Take 81 mg by mouth daily    Historical Provider, MD   vitamin B-12 (CYANOCOBALAMIN) 500 MCG tablet Take 500 mcg by mouth daily    Historical Provider, MD   insulin glargine (LANTUS SOLOSTAR) 100 UNIT/ML injection pen Inject 10 Units into the skin nightly 7/29/19   Leif Thurston MD   insulin lispro (HUMALOG KWIKPEN) 100 UNIT/ML pen Glucose 100-120 no insulin, 121-140 2 units, 141-160 4 units, 161- 180 6 units, 181-200 8 units, 200 or higher 10 units 7/29/19   Leif Thurston MD       ALLERGIES: Pcn [penicillins] and Hctz [hydrochlorothiazide]    REVIEW OF SYSTEM:   Unable to obtain as the patient is by himself by the time I want to see him and he does not recall the incident    OBJECTIVE  PHYSICAL EXAM: /72   Pulse 72   Temp 97.8 °F (36.6 °C) (Oral)   Resp 20   Ht 5' 11\" (1.803 m)   Wt 169 lb 3.2 oz (76.7 kg)   SpO2 98%   BMI 23.60 kg/m²     Physical Exam  Constitutional:       Appearance: He is ill-appearing. HENT:      Head: Normocephalic. Eyes:      Conjunctiva/sclera: Conjunctivae normal.      Pupils: Pupils are equal, round, and reactive to light. Cardiovascular:      Rate and Rhythm: Normal rate and regular rhythm. Pulmonary:      Effort: Pulmonary effort is normal. No respiratory distress. Breath sounds: Normal breath sounds. No stridor. Abdominal:      General: Bowel sounds are normal. There is no distension. Palpations: Abdomen is soft. Tenderness: There is no abdominal tenderness. Musculoskeletal:         General: No swelling, tenderness or deformity. Skin:     General: Skin is warm and dry. Neurological:      Mental Status: He is alert. He is disoriented. DATA:     Diagnostic tests reviewed for today's visit:    Most recent labs and imaging results reviewed.      LABS:    Recent Results (from the 95 U/L   Brain Natriuretic Peptide    Collection Time: 06/11/22 11:15 PM   Result Value Ref Range    Pro-BNP 4,685 pg/mL   Lactate, Sepsis    Collection Time: 06/11/22 11:15 PM   Result Value Ref Range    Lactic Acid, Sepsis 1.7 0.5 - 1.9 mmol/L   EKG 12 Lead    Collection Time: 06/11/22 11:18 PM   Result Value Ref Range    Ventricular Rate 81 BPM    Atrial Rate 81 BPM    P-R Interval 154 ms    QRS Duration 138 ms    Q-T Interval 372 ms    QTc Calculation (Bazett) 432 ms    P Axis 68 degrees    R Axis 56 degrees    T Axis 51 degrees   POCT Venous    Collection Time: 06/12/22 12:32 AM   Result Value Ref Range    POC Sodium 127 (L) 136 - 145 mEq/L    POC Potassium 6.3 (HH) 3.5 - 5.1 mEq/L    POC Chloride 107 99 - 110 mEq/L    POC Glucose 294 (H) 70 - 99 mg/dl    POC Creatinine 4.0 (H) 0.8 - 1.3 mg/dL    GFR Non-African American 14 (A) >60    GFR  17 (A) >60    Calcium, Ion 1.28 1.12 - 1.32 mmol/L    pH, Darrius 7.200 (L) 7.320 - 7.420    pCO2, Darrius 25.1 (L) 40.0 - 50.0 mm Hg    pO2, Darrius 47 Not Established mm Hg    HCO3, Venous 9.8 (L) 23.0 - 29.0 mmol/L    Base Excess, Darrius -18 (L) -3 - 3    O2 Sat, Darrius 75 Not Established %    TC02 (Calc), Darrius 11 Not Established mmol/L    Lactate 2.17 (H) 0.40 - 2.00 mmol/L    Hemoglobin 8.8 (L) 13.5 - 17.5 gm/dL    POC Hematocrit 26 (L) 41 - 53 %    Sample Type DARRIUS     Performed on SEE BELOW    Lactate, Sepsis    Collection Time: 06/12/22  2:43 AM   Result Value Ref Range    Lactic Acid, Sepsis 0.7 0.5 - 1.9 mmol/L       IMAGING:   CT HEAD WO CONTRAST    Result Date: 6/11/2022  Atrophy. There is encephalomalacia in the left frontal lobe. Findings appears similar to previous examination. CT CERVICAL SPINE WO CONTRAST    Result Date: 6/12/2022  Straightening of the normal cervical lordosis. Hypertrophic degenerative changes. If further evaluation is clinically necessary, consider correlation with MRI.          VTE Prophylaxis: unfractionated heparin -  start ASSESSMENT AND PLAN    *Vasovagal syncope due to most likely orthostatic hypotension  *Acute on chronic kidney disease secondary to prerenal azotemia and dehydration  *Hypovolemic hyponatremia  *Hyperkalemia in light of acute on chronic kidney disease  *High anion gap metabolic acidosis secondary to uremia  *Leukocytosis most likely reactive due to distress    - Admit to the floor on telemetry  - Patient is DNR CCA without intubation  - Baseline creatinine around 3 and CKD stage IV  - Start patient on normal saline at 100 mils an hour  - Hold his alfuzosin  - We will check urine osmolality/serum osmolality and urine sodium  - We will give calcium gluconate and Lokelma  - BMP every 8 hours  - Lopez was attempted to be placed twice which was unsuccessful  - We will check a ultrasound to rule out obstruction-we will check blood cultures and urine cultures  - Hold on initiating antibiotics at this point  - Hospice consult          SIGNATURE: BETTIE Hansen - CNP  DATE: June 12, 2022  TIME: 4:00 AM     Nitza Joshi MD - supervising physician

## 2022-06-12 NOTE — CARE COORDINATION
I called [facility] at [phone] and they are paging nurse to call me back. I did give them info on pt being admitted with weakness and abn labs. 1030- Received call from [place] weekend triage nurse for [facility]. She states family did not call them and they were not aware of admission. They state they do not have privileges here and will not be on case while he is hospitalized. They will be calling daily to check on plan and help coordinate dc if family still wants them after this hospital stay.

## 2022-06-12 NOTE — PLAN OF CARE
See OT evaluation for all goals and OT POC.  Electronically signed by RUBY Flores/L on 6/12/2022 at 10:35 AM

## 2022-06-12 NOTE — CONSULTS
[facility]  Consult Note           Reason for Consult:  PAULINO on CKD, hyperkalemia  Requesting Physician:  Dr. RG [facility] OF [facility]    Chief Complaint:  AMS  History Obtained From:  patient, electronic medical record    History of Present Ilness:    80 y.o. male with history s/f CKD stage IV, chronic hydronephrosis, A.fib, T2DM, HTN, HLD who presented for AMS. Initially pt had fallen and EMS was called for lift assist however it appeared that pt had syncopal event for close to 1 minute? Pt too weak as well so brought in to the hospital. Notably pt is [facility] and is in hospice. Nephrology consulted for PAULINO and hyperkalemia. Pt used to follow up with me however last visit last year. Pt decided for hospice and didn't want to take any medications again. Presented w/ Scr 4, K 6.4 and Na 124. Baseline Scr ~2.9-3 w/ eGFR high teens. K improved w/ medical management. Pt is hard of hearing     Past Medical History:        Diagnosis Date    Acute metabolic encephalopathy     Anemia in CKD (chronic kidney disease)     Blind right eye     Chronic hyponatremia     CKD (chronic kidney disease) stage 4, GFR 15-29 ml/min ([facility])     Diabetes mellitus ([address]     Hypertension     Kidney stone     Klebsiella pneumoniae sepsis (HCC)     Mixed hyperlipidemia     Obstructive uropathy     Paroxysmal A-fib (HCC)     Pneumonia     Sacral ulcer ([address]     Urethral stricture     UTI due to Klebsiella species        Past Surgical History:        Procedure Laterality Date    EYE SURGERY      KIDNEY STONE SURGERY  1995       Home Medications:    No current facility-administered medications on file prior to encounter.      Current Outpatient Medications on File Prior to Encounter   Medication Sig Dispense Refill    sodium bicarbonate 650 MG tablet Take 2 tablets by mouth 3 times daily 90 tablet 3    alfuzosin (UROXATRAL) 10 MG extended release tablet Take 1 tablet by mouth daily (with breakfast) 30 tablet 3    aspirin 81 MG tablet Take 81 mg by mouth daily      vitamin B-12 (CYANOCOBALAMIN) 500 MCG tablet Take 500 mcg by mouth daily      insulin glargine (LANTUS SOLOSTAR) 100 UNIT/ML injection pen Inject 10 Units into the skin nightly 5 pen 3    insulin lispro (HUMALOG KWIKPEN) 100 UNIT/ML pen Glucose 100-120 no insulin, 121-140 2 units, 141-160 4 units, 161- 180 6 units, 181-200 8 units, 200 or higher 10 units 5 pen 3       Allergies:  Pcn [penicillins] and Hctz [hydrochlorothiazide]    Social History:    Social History     Socioeconomic History    Marital status:      Spouse name: Not on file    Number of children: Not on file    Years of education: Not on file    Highest education level: Not on file   Occupational History    Not on file   Tobacco Use    Smoking status: Former Smoker    Smokeless tobacco: Never Used   Vaping Use    Vaping Use: Never used   Substance and Sexual Activity    Alcohol use: Not Currently    Drug use: Never    Sexual activity: Not on file   Other Topics Concern    Not on file   Social History Narrative    Not on file     Social Determinants of Health     Financial Resource Strain:     Difficulty of Paying Living Expenses: Not on file   Food Insecurity:     Worried About Running Out of Food in the Last Year: Not on file    Arthur of Food in the Last Year: Not on file   Transportation Needs:     Lack of Transportation (Medical): Not on file    Lack of Transportation (Non-Medical):  Not on file   Physical Activity:     Days of Exercise per Week: Not on file    Minutes of Exercise per Session: Not on file   Stress:     Feeling of Stress : Not on file   Social Connections:     Frequency of Communication with Friends and Family: Not on file    Frequency of Social Gatherings with Friends and Family: Not on file    Attends Sikhism Services: Not on file    Active Member of Clubs or Organizations: Not on file    Attends Club or Organization Meetings: Not on file    Marital Status: Not on file   Intimate Partner Violence:     Fear of Current or Ex-Partner: Not on file    Emotionally Abused: Not on file    Physically Abused: Not on file    Sexually Abused: Not on file   Housing Stability:     Unable to Pay for Housing in the Last Year: Not on file    Number of Jamaal in the Last Year: Not on file    Unstable Housing in the Last Year: Not on file       Family History:   History reviewed. No pertinent family history.     Review of Systems:   Limited due to patient's hearing, very hard of hearing  + generalized weakness     Physical exam:   Constitutional:  VITALS:  BP (!) 123/52   Pulse 69   Temp 97.5 °F (36.4 °C)   Resp 20   Ht 5' 11\" (1.803 m)   Wt 169 lb 3.2 oz (76.7 kg)   SpO2 100%   BMI 23.60 kg/m²     General: alert, in no apparent distress  HEENT: normocephalic, atraumatic, anicteric  Neck: supple, no mass  Lungs: non-labored respirations, clear to auscultation bilaterally  Heart: regular rate and rhythm, no murmurs or rubs  Abdomen: soft, non-tender, non-distended  MSK: no joint swelling or tenderness  Ext: no cyanosis, no peripheral edema  Neuro: alert  Psych: normal mood and affect    Data/  CBC:   Lab Results   Component Value Date    WBC 14.6 06/11/2022    RBC 2.73 06/11/2022    HGB 8.8 06/12/2022    HCT 26.2 06/11/2022    MCV 95.6 06/11/2022    MCH 30.4 06/11/2022    MCHC 31.7 06/11/2022    RDW 14.4 06/11/2022     06/11/2022    MPV 7.0 01/08/2021     BMP:    Lab Results   Component Value Date     06/12/2022    K 5.4 06/12/2022     06/12/2022    CO2 12 06/12/2022     06/12/2022    LABALBU 3.0 06/11/2022    CREATININE 4.12 06/12/2022    CALCIUM 9.1 06/12/2022    GFRAA 16.6 06/12/2022    LABGLOM 13.7 06/12/2022    GLUCOSE 266 06/12/2022     CT HEAD WO CONTRAST    Result Date: 6/11/2022  Patient: Julius Christensen  Time Out: 23:59 Exam(s): CT HEAD Without Contrast  EXAM:   CT Head Without Intravenous Contrast  CLINICAL HISTORY:    Reason for exam: fall, AMS. Chief complaint AMS, possible fall, eval for trauma  TECHNIQUE:   Axial computed tomography images of the head/brain without intravenous contrast.  All CT scan at this facility use dose modulation, iterative reconstruction, and/or weight based dosing when appropriate to reduce radiation dose to as low as reasonably achievable. COMPARISON: 1/18/2022  FINDINGS:   Brain: No hemorrhage. There is encephalomalacia noted in the left frontal lobe. Ventricles: There is prominence of the ventricular system with deepening the sulci consistent with cortical and central atrophy. Bones/joints:  Unremarkable. No acute fracture. Soft tissues: There is prosthesis noted in the right orbit. Sinuses: There is mucoperiosteal thickening in the left maxillary sinus. Mastoid air cells:  Unremarkable as visualized. No mastoid effusion. Electronically signed by Terry Quijano MD on 06-11-22 at 2359    Atrophy. There is encephalomalacia in the left frontal lobe. Findings appears similar to previous examination. CT CERVICAL SPINE WO CONTRAST    Result Date: 6/12/2022  Patient: Kulwinder Soliman  Time Out: 00:06 Exam(s): CT C SPINE  EXAM:   CT Cervical Spine Without Intravenous Contrast  CLINICAL HISTORY:    Reason for exam: fall, AMS. Chief complaint AMS, possible fall, eval for trauma  TECHNIQUE:   Axial computed tomography images of the cervical spine without intravenous contrast.  All CT scan at this facility use dose modulation, iterative reconstruction, and/or weight based dosing when appropriate to reduce radiation dose to as low as reasonably achievable. COMPARISON:   No relevant prior studies available. FINDINGS:   Vertebrae: Straightening of the normal cervical lordosis. No acute fracture. Marked hypertrophic degenerative changes with degenerative posterior osteophyte. These cause spinal stenoses at C5-6 and C6-7. Yasmin Qian Discs/spinal canal/neural foramina:  There is disc space narrowing most pronounced at C5-6.   Soft tissues:  Unremarkable. Electronically signed by Georgia Vivas MD on 06-12-22 at [facility] 32 of the normal cervical lordosis. Hypertrophic degenerative changes. If further evaluation is clinically necessary, consider correlation with MRI. XR CHEST PORTABLE    Result Date: 6/12/2022  EXAMINATION:  CHEST RADIOGRAPH (PORTABLE SINGLE VIEW AP) Exam Date/Time:  6/11/2022 11:24 PM Clinical History:   PNA, syncope Comparison:  CT chest 1/18/2022. RESULT: Lines, tubes, and devices:  None. Lungs and pleura:  No focal consolidation. No pneumothorax. No pleural effusion. Cardiomediastinal silhouette:  Stable cardiomediastinal silhouette. Atherosclerotic calcification of the aortic arch. Other: No acute osseous process. No acute cardiopulmonary abnormality. Assessment:  80 y.o. male with history s/f CKD stage IV, chronic hydronephrosis, A.fib, T2DM, HTN, HLD who presented for AMS. 1. PAULINO on CKD stage IV: ? 2/2 hypotension, BP borderline low on presentation, Scr 4, baseline scr 2.9-3 w/ eGFR high teens, risk factors of CKD include T2DM, HTN, obstructive uropathy   2. Hyperkalemia: improved w/ medical management   3. Hyponatremia  4. Metabolic acidosis: 2/2 renal failure, chronic, dating as far back as 2019  5. Hypoalbuminemia  6. Anemia    Plan:  - changing fluids to isotonic bicarb gtt to help with acidosis and hyperkalemia  - ok to continue lokelma for now  - stopping PO bicarb  - ultrasound pending  - hospice consulted     Thank you for the consultation. Will continue to follow  Please do not hesitate to call with questions.     Marcus Crane MD, MD

## 2022-06-12 NOTE — PROGRESS NOTES
[facility] OCCUPATIONAL THERAPY EVALUATION - ACUTE     NAME: Mel Jiang  : 1932 (80 y.o.)  MRN: [id number]  CODE STATUS: DNR-CCA  Room: F0/Y584-46    Date of Service: 2022    Patient Diagnosis(es): Hyperkalemia [E87.5]  Hyponatremia [E87.1]  Uremia [N19]  Obstructive uropathy [N13.9]  Elevated troponin [R77.8]  Acute kidney injury superimposed on CKD ([address] [N17.9, N18.9]   Patient Active Problem List    Diagnosis Date Noted    Syncope and collapse 2022    Hydronephrosis     General weakness 2020    Chronic kidney disease, stage IV (severe) ([address] 2020    Anemia of chronic renal failure 2020    Ataxic gait     Dementia due to Alzheimer's disease ([address]     Bradycardia 10/03/2019    Fall at home 10/03/2019    Hyponatremia 10/03/2019    Hyperkalemia 10/03/2019    Hypotension 10/03/2019    Paroxysmal A-fib (HCC)     Hypertension     Anemia in CKD (chronic kidney disease)     Gram negative sepsis (HCC)     Klebsiella pneumoniae sepsis (HCC)     Uncontrolled type 2 diabetes mellitus with hyperglycemia ([address]     Pneumonia 2019        Past Medical History:   Diagnosis Date    Acute metabolic encephalopathy     Anemia in CKD (chronic kidney disease)     Blind right eye     Chronic hyponatremia     CKD (chronic kidney disease) stage 4, GFR 15-29 ml/min (HCC)     Diabetes mellitus ([address]     Hypertension     Kidney stone     Klebsiella pneumoniae sepsis (HCC)     Mixed hyperlipidemia     Obstructive uropathy     Paroxysmal A-fib (HCC)     Pneumonia     Sacral ulcer ([address]     Urethral stricture     UTI due to Klebsiella species      Past Surgical History:   Procedure Laterality Date    EYE SURGERY      KIDNEY STONE SURGERY          Restrictions  Restrictions/Precautions: Fall Risk,Contact Precautions (High per lopez score; MRSA)        Safety Devices: Safety Devices  Type of Devices: Bed alarm in place;Call light within reach; Left in bed Patient's date of birth confirmed: Yes    General:  Chart Reviewed: Yes  Patient assessed for rehabilitation services?: Yes  Family / Caregiver Present: No    Subjective  Subjective: Patient agreeable to OT evaluation       Pain at start of treatment: Denies    Pain at end of treatment: Denies    Location: N/A  Nursing notified: N/A  RN: N/A  Intervention: None    Prior Level of Function:  Social/Functional History  Lives With: Spouse  Type of Home: Condo  Home Layout: Two level,Bed/Bath upstairs,1/2 bath on main level  Home Access: Level entry  Bathroom Shower/Tub: Tub/Shower unit  Bathroom Equipment: Shower chair  Home Equipment: Rosalind Ply, rolling  ADL Assistance: Needs assistance (indep dressing; assistance of HH for showering 1x/wk)  Homemaking Assistance: Needs assistance (assists wife with homemaking tasks)  Homemaking Responsibilities: Yes (assists wife as able)  Ambulation Assistance: Independent ([employer])  Transfer Assistance: Independent  Active : No  Patient's  Info: family  Additional Comments: pt is questionable historian    OBJECTIVE:     Orientation Status:  Orientation  Overall Orientation Status: Within Functional Limits    Observation:  Observation/Palpation  Observation: No acute distress noted, patient pleasant and cooperative on evaluation    Cognition Status:  Cognition  Overall Cognitive Status: Exceptions  Arousal/Alertness: Appropriate responses to stimuli  Following Commands:  Follows one step commands with repetition  Attention Span: Attends with cues to redirect  Memory: Appears intact  Safety Judgement: Decreased awareness of need for assistance;Decreased awareness of need for safety  Problem Solving: Assistance required to generate solutions;Assistance required to implement solutions;Assistance required to correct errors made;Assistance required to identify errors made  Insights: Fully aware of deficits  Initiation: Requires cues for some  Sequencing: Requires cues for some    Perception Status:  Perception  Overall Perceptual Status: WFL    Vision and Hearing Status:  Vision  Vision Exceptions: Wears glasses at all times  Hearing  Hearing: Exceptions to [facility]  Hearing Exceptions: Hard of hearing/hearing concerns        GROSS ASSESSMENT AROM/PROM:  AROM: Grossly decreased, non-functional       ROM:   LUE AROM (degrees)  LUE General AROM: Decreased shoulder ROM  Left Hand AROM (degrees)  Left Hand AROM: WFL  RUE AROM (degrees)  RUE General AROM: Decreased shoulder ROM  Right Hand AROM (degrees)  Right Hand AROM: WFL    UE STRENGTH:  Strength: Grossly decreased, non-functional    UE COORDINATION:  Coordination: Grossly decreased, non-functional    UE TONE:  Tone: Normal    UE SENSATION:  Sensation: Intact    Hand Dominance:  Hand Dominance  Hand Dominance: Right    ADL Status:  ADL  Feeding: Setup  Grooming: Minimal assistance; Increased time to complete;Verbal cueing  UE Bathing: Minimal assistance;Verbal cueing; Increased time to complete  LE Bathing: Maximum assistance; Increased time to complete;Verbal cueing  UE Dressing: Moderate assistance; Increased time to complete;Verbal cueing  LE Dressing: Dependent/Total;Verbal cueing; Increased time to complete  Toileting: Dependent/Total  Additional Comments:  All ADLs simulated as above  Toilet Transfers  Toilet Transfers Comments: Not assessed on evaluation; anticipate Dep based on functional observation  Tub Transfers  Tub Transfers: Not tested    Functional Mobility:  Functional mobility NT on evaluation secondary to patient unable to attain or maintain standing position without dependent level of assistance    Bed Mobility  Bed mobility  Supine to Sit: Dependent/Total  Sit to Supine: Dependent/Total    Seated and Standing Balance:  Balance  Sitting:  (Max A)  Standing:  (Dep)    Functional Endurance:  Activity Tolerance  Activity Tolerance: Patient limited by fatigue    D/C Recommendations:  OT D/C RECOMMENDATIONS  REQUIRES OT FOLLOW-UP: Yes    Equipment Recommendations:  OT Equipment Recommendations  Other: Continue to assess    OT Education:   Patient Education  Education Given To: Patient  Education Provided: Role of Therapy;Plan of Care  Education Method: Verbal  Barriers to Learning: Cognition; Hearing  Education Outcome: Continued education needed    OT Follow Up:   OT D/C RECOMMENDATIONS  REQUIRES OT FOLLOW-UP: Yes       Assessment/Discharge Disposition:  Assessment: Patient is a 80year old male who presents to [facility] from home with spouse with the above deficits. Patient would benefit from continued occupational therapy to increase safety and maximize independence with self-care skills.   Performance deficits / Impairments: Decreased functional mobility ,Decreased endurance,Decreased coordination,Decreased ADL status,Decreased balance,Decreased strength,Decreased safe awareness,Decreased high-level IADLs  Prognosis: Good  Discharge Recommendations: Continue to assess pending progress  Decision Making: High Complexity  History: Multiple comorbidities  Exam: 8 performance deficits  Assistance / Modification: Max-Dep    AMPAC (Six Click) Self care Score   How much help for putting on and taking off regular lower body clothing?: Total  How much help for Bathing?: A Lot  How much help for Toileting?: Total  How much help for putting on and taking off regular upper body clothing?: A Lot  How much help for taking care of personal grooming?: A Little  How much help for eating meals?: A Little  AM-[facility] Inpatient Daily Activity Raw Score: 12  AM-PAC Inpatient ADL T-Scale Score : 30.6  ADL Inpatient CMS 0-100% Score: 66.57    Therapy key for assistance levels -   Independent/Mod I = Pt. is able to perform task with no assistance but may require a device   Stand by assistance = Pt. does not perform task at an independent level but does not need physical assistance, requires verbal cues  Minimal, Moderate, Maximal Assistance = Pt. requires physical assistance (25%, 50%, 75% assist from helper) for task but is able to actively participate in task   Dependent = Pt. requires total assistance with task and is not able to actively participate with task completion     Plan:  Plan  Times per Week: 1-3x/week  Plan Weeks: Length of acute care stay  Current Treatment Recommendations: Strengthening,Balance training,Functional mobility training,Endurance training,Neuromuscular re-education,Positioning,Equipment evaluation, education, & procurement,Patient/Caregiver education & training,Safety education & training,Self-Care / ADL,Cognitive/Perceptual training,Coordination training    Goals:   Patient will:    - Improve functional endurance to tolerate/complete 30 mins of ADL's  - Be Set Up in UB ADLs   - Be Min A in LB ADLs  - Be Min A in ADL transfers without LOB  - Be Min A in toileting tasks  - Improve bilateral hand fine motor coordination to [facility] in order to manage clothing fasteners/self-care containers in a timely manner  - Improve bilateral UE strength and endurance to 4/5 in order to participate in self-care activities as projected. - Sequence self-care tasks with no cues for safety awareness. - Other :  Improve standing tolerance/balance to complete ADLs as projected.      Patient Goal: Patient goals : Patient does not state     Discussed and agreed upon: Yes Comments:       Therapy Time:   Individual   Time In 0941   Time Out 0956   Minutes 15          Eval: 15 minutes     Electronically signed by:    JODI Cunningham,   6/12/2022, 10:34 AM

## 2022-06-13 ENCOUNTER — APPOINTMENT (OUTPATIENT)
Dept: ULTRASOUND IMAGING | Age: 87
DRG: 690 | End: 2022-06-13
Payer: MEDICARE

## 2022-06-13 LAB
ANION GAP SERPL CALCULATED.3IONS-SCNC: 12 MEQ/L (ref 9–15)
ANION GAP SERPL CALCULATED.3IONS-SCNC: 13 MEQ/L (ref 9–15)
ANION GAP SERPL CALCULATED.3IONS-SCNC: 14 MEQ/L (ref 9–15)
BASOPHILS ABSOLUTE: 0 K/UL (ref 0–0.2)
BASOPHILS RELATIVE PERCENT: 0.4 %
BUN BLDV-MCNC: 102 MG/DL (ref 8–23)
BUN BLDV-MCNC: 107 MG/DL (ref 8–23)
BUN BLDV-MCNC: 110 MG/DL (ref 8–23)
CALCIUM SERPL-MCNC: 8.3 MG/DL (ref 8.5–9.9)
CALCIUM SERPL-MCNC: 8.4 MG/DL (ref 8.5–9.9)
CALCIUM SERPL-MCNC: 8.5 MG/DL (ref 8.5–9.9)
CHLORIDE BLD-SCNC: 100 MEQ/L (ref 95–107)
CHLORIDE BLD-SCNC: 95 MEQ/L (ref 95–107)
CHLORIDE BLD-SCNC: 97 MEQ/L (ref 95–107)
CO2: 13 MEQ/L (ref 20–31)
CO2: 16 MEQ/L (ref 20–31)
CO2: 19 MEQ/L (ref 20–31)
CREAT SERPL-MCNC: 4.26 MG/DL (ref 0.7–1.2)
CREAT SERPL-MCNC: 4.53 MG/DL (ref 0.7–1.2)
CREAT SERPL-MCNC: 4.62 MG/DL (ref 0.7–1.2)
CREATININE URINE: 45.2 MG/DL
EKG ATRIAL RATE: 81 BPM
EKG P AXIS: 68 DEGREES
EKG P-R INTERVAL: 154 MS
EKG Q-T INTERVAL: 372 MS
EKG QRS DURATION: 138 MS
EKG QTC CALCULATION (BAZETT): 432 MS
EKG R AXIS: 56 DEGREES
EKG T AXIS: 51 DEGREES
EKG VENTRICULAR RATE: 81 BPM
EOSINOPHILS ABSOLUTE: 0.1 K/UL (ref 0–0.7)
EOSINOPHILS RELATIVE PERCENT: 0.5 %
GFR AFRICAN AMERICAN: 14.5
GFR AFRICAN AMERICAN: 14.8
GFR AFRICAN AMERICAN: 15.9
GFR NON-AFRICAN AMERICAN: 12
GFR NON-AFRICAN AMERICAN: 12.3
GFR NON-AFRICAN AMERICAN: 13.2
GLUCOSE BLD-MCNC: 202 MG/DL (ref 70–99)
GLUCOSE BLD-MCNC: 228 MG/DL (ref 70–99)
GLUCOSE BLD-MCNC: 255 MG/DL (ref 70–99)
GLUCOSE BLD-MCNC: 255 MG/DL (ref 70–99)
GLUCOSE BLD-MCNC: 282 MG/DL (ref 70–99)
HCT VFR BLD CALC: 24.1 % (ref 42–52)
HEMOGLOBIN: 8 G/DL (ref 14–18)
LYMPHOCYTES ABSOLUTE: 2.1 K/UL (ref 1–4.8)
LYMPHOCYTES RELATIVE PERCENT: 19.6 %
MCH RBC QN AUTO: 31.6 PG (ref 27–31.3)
MCHC RBC AUTO-ENTMCNC: 33.1 % (ref 33–37)
MCV RBC AUTO: 95.6 FL (ref 80–100)
MONOCYTES ABSOLUTE: 1.3 K/UL (ref 0.2–0.8)
MONOCYTES RELATIVE PERCENT: 12.5 %
NEUTROPHILS ABSOLUTE: 7.1 K/UL (ref 1.4–6.5)
NEUTROPHILS RELATIVE PERCENT: 67 %
PDW BLD-RTO: 14.6 % (ref 11.5–14.5)
PERFORMED ON: ABNORMAL
PERFORMED ON: ABNORMAL
PLATELET # BLD: 271 K/UL (ref 130–400)
POTASSIUM REFLEX MAGNESIUM: 4.5 MEQ/L (ref 3.4–4.9)
POTASSIUM REFLEX MAGNESIUM: 4.8 MEQ/L (ref 3.4–4.9)
POTASSIUM REFLEX MAGNESIUM: 5.1 MEQ/L (ref 3.4–4.9)
RBC # BLD: 2.52 M/UL (ref 4.7–6.1)
SODIUM BLD-SCNC: 125 MEQ/L (ref 135–144)
SODIUM BLD-SCNC: 126 MEQ/L (ref 135–144)
SODIUM BLD-SCNC: 128 MEQ/L (ref 135–144)
SODIUM URINE: 51 MEQ/L
WBC # BLD: 10.5 K/UL (ref 4.8–10.8)

## 2022-06-13 PROCEDURE — 87086 URINE CULTURE/COLONY COUNT: CPT

## 2022-06-13 PROCEDURE — 2580000003 HC RX 258: Performed by: INTERNAL MEDICINE

## 2022-06-13 PROCEDURE — 76770 US EXAM ABDO BACK WALL COMP: CPT

## 2022-06-13 PROCEDURE — 36415 COLL VENOUS BLD VENIPUNCTURE: CPT

## 2022-06-13 PROCEDURE — 83935 ASSAY OF URINE OSMOLALITY: CPT

## 2022-06-13 PROCEDURE — 6360000002 HC RX W HCPCS: Performed by: NURSE PRACTITIONER

## 2022-06-13 PROCEDURE — 80048 BASIC METABOLIC PNL TOTAL CA: CPT

## 2022-06-13 PROCEDURE — 2580000003 HC RX 258: Performed by: STUDENT IN AN ORGANIZED HEALTH CARE EDUCATION/TRAINING PROGRAM

## 2022-06-13 PROCEDURE — 2060000000 HC ICU INTERMEDIATE R&B

## 2022-06-13 PROCEDURE — 99222 1ST HOSP IP/OBS MODERATE 55: CPT | Performed by: INTERNAL MEDICINE

## 2022-06-13 PROCEDURE — 6370000000 HC RX 637 (ALT 250 FOR IP): Performed by: NURSE PRACTITIONER

## 2022-06-13 PROCEDURE — 6360000002 HC RX W HCPCS: Performed by: STUDENT IN AN ORGANIZED HEALTH CARE EDUCATION/TRAINING PROGRAM

## 2022-06-13 PROCEDURE — 82570 ASSAY OF URINE CREATININE: CPT

## 2022-06-13 PROCEDURE — 84300 ASSAY OF URINE SODIUM: CPT

## 2022-06-13 PROCEDURE — 87077 CULTURE AEROBIC IDENTIFY: CPT

## 2022-06-13 PROCEDURE — 2580000003 HC RX 258: Performed by: NURSE PRACTITIONER

## 2022-06-13 PROCEDURE — 51703 INSERT BLADDER CATH COMPLEX: CPT | Performed by: UROLOGY

## 2022-06-13 PROCEDURE — 87186 SC STD MICRODIL/AGAR DIL: CPT

## 2022-06-13 PROCEDURE — 2500000003 HC RX 250 WO HCPCS: Performed by: STUDENT IN AN ORGANIZED HEALTH CARE EDUCATION/TRAINING PROGRAM

## 2022-06-13 PROCEDURE — 6360000002 HC RX W HCPCS: Performed by: INTERNAL MEDICINE

## 2022-06-13 PROCEDURE — 85025 COMPLETE CBC W/AUTO DIFF WBC: CPT

## 2022-06-13 PROCEDURE — 99222 1ST HOSP IP/OBS MODERATE 55: CPT | Performed by: UROLOGY

## 2022-06-13 PROCEDURE — 97535 SELF CARE MNGMENT TRAINING: CPT

## 2022-06-13 RX ADMIN — ASPIRIN 81 MG: 81 TABLET, CHEWABLE ORAL at 08:51

## 2022-06-13 RX ADMIN — SODIUM CHLORIDE, PRESERVATIVE FREE 10 ML: 5 INJECTION INTRAVENOUS at 08:51

## 2022-06-13 RX ADMIN — SODIUM ZIRCONIUM CYCLOSILICATE 10 G: 10 POWDER, FOR SUSPENSION ORAL at 14:42

## 2022-06-13 RX ADMIN — EPOETIN ALFA-EPBX 10000 UNITS: 10000 INJECTION, SOLUTION INTRAVENOUS; SUBCUTANEOUS at 14:38

## 2022-06-13 RX ADMIN — SODIUM ZIRCONIUM CYCLOSILICATE 10 G: 10 POWDER, FOR SUSPENSION ORAL at 20:42

## 2022-06-13 RX ADMIN — HEPARIN SODIUM 5000 UNITS: 5000 INJECTION INTRAVENOUS; SUBCUTANEOUS at 20:43

## 2022-06-13 RX ADMIN — MEROPENEM 500 MG: 500 INJECTION, POWDER, FOR SOLUTION INTRAVENOUS at 06:56

## 2022-06-13 RX ADMIN — INSULIN GLARGINE 5 UNITS: 100 INJECTION, SOLUTION SUBCUTANEOUS at 21:00

## 2022-06-13 RX ADMIN — CYANOCOBALAMIN TAB 500 MCG 500 MCG: 500 TAB at 08:51

## 2022-06-13 RX ADMIN — HEPARIN SODIUM 5000 UNITS: 5000 INJECTION INTRAVENOUS; SUBCUTANEOUS at 14:41

## 2022-06-13 RX ADMIN — MEROPENEM 500 MG: 500 INJECTION, POWDER, FOR SOLUTION INTRAVENOUS at 17:33

## 2022-06-13 RX ADMIN — SODIUM BICARBONATE: 84 INJECTION, SOLUTION INTRAVENOUS at 04:57

## 2022-06-13 RX ADMIN — HEPARIN SODIUM 5000 UNITS: 5000 INJECTION INTRAVENOUS; SUBCUTANEOUS at 08:51

## 2022-06-13 RX ADMIN — SODIUM ZIRCONIUM CYCLOSILICATE 10 G: 10 POWDER, FOR SUSPENSION ORAL at 08:51

## 2022-06-13 RX ADMIN — SODIUM BICARBONATE: 84 INJECTION, SOLUTION INTRAVENOUS at 20:46

## 2022-06-13 NOTE — PROGRESS NOTES
Nephrology Progress Note    Assessment:  80 y.o. male with history s/f CKD stage IV, chronic hydronephrosis, A.fib, T2DM, HTN, HLD who presented for AMS.    1. PAULINO on CKD stage IV: ? 2/2 hypotension, BP borderline low on presentation, Scr 4, baseline scr 2.9-3 w/ eGFR high teens, risk factors of CKD include T2DM, HTN, obstructive uropathy   2. Hyperkalemia: improved w/ medical management   3. Hyponatremia  4. Metabolic acidosis: 2/2 renal failure, chronic, dating as far back as 2019, also w/ mild lactic acidosis  5. Hypoalbuminemia  6.  Anemia     Plan:  - continue bicarb gtt for now  - BMP pending currently to help guide next decision  - ultrasound pending  - hospice has seen, family decision pending    - giving retacrit for anemia     Patient Active Problem List:     Pneumonia     Uncontrolled type 2 diabetes mellitus with hyperglycemia ([address]     Gram negative sepsis ([address]     Klebsiella pneumoniae sepsis ([address]     Bradycardia     Fall at home     Hyponatremia     Hyperkalemia     Paroxysmal A-fib ([address]     Hypertension     Anemia in CKD (chronic kidney disease)     Hypotension     Ataxic gait     Dementia due to Alzheimer's disease ([address]     Chronic kidney disease, stage IV (severe) ([address]     Anemia of chronic renal failure     General weakness     Hydronephrosis     Syncope and collapse      Subjective:  Admit Date: 6/11/2022    Interval History: Na relatively stable at 125, as is function, has been on bicarb gtt, hospice has seen     Medications:  Scheduled Meds:   aspirin  81 mg Oral Daily    insulin glargine  5 Units SubCUTAneous Nightly    vitamin B-12  500 mcg Oral Daily    sodium chloride flush  5-40 mL IntraVENous 2 times per day    heparin (porcine)  5,000 Units SubCUTAneous TID    sodium zirconium cyclosilicate  10 g Oral TID    meropenem  500 mg IntraVENous Q12H     Continuous Infusions:   sodium chloride      sodium bicarbonate infusion 100 mL/hr at 06/13/22 2815       CBC:   Recent Labs 06/11/22  2315 06/11/22  2315 06/12/22  0032 06/13/22  0458   WBC 14.6*  --   --  10.5   HGB 8.3*   < > 8.8* 8.0*     --   --  271    < > = values in this interval not displayed. CMP:    Recent Labs     06/12/22  0917 06/12/22  1752 06/13/22  0113   * 121* 125*   K 5.4* 5.7* 5.1*    95 100   CO2 12* 13* 13*   * 111* 107*   CREATININE 4.12* 4.29* 4.26*   GLUCOSE 266* 270* 255*   CALCIUM 9.1 8.9 8.5   LABGLOM 13.7* 13.1* 13.2*     Troponin:   Recent Labs     06/11/22 2315   TROPONINI 0.065*     BNP: No results for input(s): BNP in the last 72 hours. INR: No results for input(s): INR in the last 72 hours. Lipids:   Recent Labs     06/11/22 2315   LIPASE 213*     Liver:   Recent Labs     06/11/22 2315   AST 13   ALT 7   ALKPHOS 116*   PROT 8.3*   LABALBU 3.0*   BILITOT 0.3     Iron:  No results for input(s): IRONS, FERRITIN in the last 72 hours. Invalid input(s): LABIRONS  Urinalysis: No results for input(s): UA in the last 72 hours.     Objective:  Vitals: BP (!) 142/70   Pulse (!) 102   Temp 98 °F (36.7 °C) (Oral)   Resp 16   Ht 5' 11\" (1.803 m)   Wt 171 lb 8 oz (77.8 kg)   SpO2 97%   BMI 23.92 kg/m²    Wt Readings from Last 3 Encounters:   06/13/22 171 lb 8 oz (77.8 kg)   01/18/22 175 lb (79.4 kg)   12/28/20 150 lb (68 kg)      24HR INTAKE/OUTPUT:      Intake/Output Summary (Last 24 hours) at 6/13/2022 1252  Last data filed at 6/13/2022 0800  Gross per 24 hour   Intake 120 ml   Output --   Net 120 ml       General: alert, in no apparent distress  HEENT: normocephalic, atraumatic, anicteric  Neck: supple, no mass  Lungs: non-labored respirations, clear to auscultation bilaterally  Heart: regular rate and rhythm, no murmurs or rubs  Abdomen: soft, non-tender, non-distended  Ext: no cyanosis, no peripheral edema  Neuro: alert and oriented, no gross abnormalities        Electronically signed by Scout Voss MD, MD

## 2022-06-13 NOTE — PROGRESS NOTES
Department of Internal Medicine  General Internal Medicine  Attending Progress Note      SUBJECTIVE:  Pt seen and examined. Hard of hearing. No complaints. Wife and daughter at bedside. Discussed positive blood cultures and family ok with PO abx on discharge as plan is to continue with hospice at discharge. US bladder shows severe urinary retention. Pt with history of urethral stricture. Nurse to attempt figueroa. Will consult urology if unable to place.  Family updated on plan and agreeable    OBJECTIVE      Medications    Current Facility-Administered Medications: epoetin dahlia-epbx (RETACRIT) injection 10,000 Units, 10,000 Units, SubCUTAneous, Q7 Days  aspirin chewable tablet 81 mg, 81 mg, Oral, Daily  insulin glargine (LANTUS) injection vial 5 Units, 5 Units, SubCUTAneous, Nightly  vitamin B-12 (CYANOCOBALAMIN) tablet 500 mcg, 500 mcg, Oral, Daily  sodium chloride flush 0.9 % injection 5-40 mL, 5-40 mL, IntraVENous, 2 times per day  sodium chloride flush 0.9 % injection 5-40 mL, 5-40 mL, IntraVENous, PRN  0.9 % sodium chloride infusion, , IntraVENous, PRN  heparin (porcine) injection 5,000 Units, 5,000 Units, SubCUTAneous, TID  ondansetron (ZOFRAN-ODT) disintegrating tablet 4 mg, 4 mg, Oral, Q8H PRN **OR** ondansetron (ZOFRAN) injection 4 mg, 4 mg, IntraVENous, Q6H PRN  polyethylene glycol (GLYCOLAX) packet 17 g, 17 g, Oral, Daily PRN  acetaminophen (TYLENOL) tablet 650 mg, 650 mg, Oral, Q6H PRN **OR** acetaminophen (TYLENOL) suppository 650 mg, 650 mg, Rectal, Q6H PRN  sodium zirconium cyclosilicate (LOKELMA) oral suspension 10 g, 10 g, Oral, TID  sodium bicarbonate 150 mEq in dextrose 5 % 1,000 mL infusion, , IntraVENous, Continuous  meropenem (MERREM) 500 mg IVPB (mini-bag), 500 mg, IntraVENous, Q12H  Physical    VITALS:  BP (!) 150/54   Pulse 86   Temp 98.2 °F (36.8 °C) (Oral)   Resp 16   Ht 5' 11\" (1.803 m)   Wt 171 lb 8 oz (77.8 kg)   SpO2 99%   BMI 23.92 kg/m²   Constitutional: Awake and alert in no acute distress. Sitting in chair comfortably, hard of hearing  Head: Normocephalic, atraumatic  Eyes: EOMI, PERRLA  ENT: dry mucous membranes  Neck: neck supple, trachea midline  Lungs: Good inspiratory effort, no wheeze, no rhonchi, no rales  Heart: RRR, normal S1 and S2  GI: Soft, non-distended, non tender, no guarding, no rebound, +BS  MSK: no edema noted  Skin: warm, dry     Data    CBC:   Lab Results   Component Value Date    WBC 10.5 06/13/2022    RBC 2.52 06/13/2022    HGB 8.0 06/13/2022    HCT 24.1 06/13/2022    MCV 95.6 06/13/2022    MCH 31.6 06/13/2022    MCHC 33.1 06/13/2022    RDW 14.6 06/13/2022     06/13/2022    MPV 7.0 01/08/2021     CMP:    Lab Results   Component Value Date     06/13/2022    K 4.8 06/13/2022    CL 97 06/13/2022    CO2 16 06/13/2022     06/13/2022    CREATININE 4.53 06/13/2022    GFRAA 14.8 06/13/2022    LABGLOM 12.3 06/13/2022    GLUCOSE 202 06/13/2022    PROT 8.3 06/11/2022    LABALBU 3.0 06/11/2022    CALCIUM 8.3 06/13/2022    BILITOT 0.3 06/11/2022    ALKPHOS 116 06/11/2022    AST 13 06/11/2022    ALT 7 06/11/2022       ASSESSMENT AND PLAN      # Syncope likely orthostatic in the setting of dehydration  - pt with hyponatremia and appears dehydrated with dry mucous membranes  - hydrating with IVF  - pt is current with hospice at home. Hospice consulted  - DNR-CCA  - PT/OT ordered on admission. [address] [address] for therapy while admitted, but doubt that continuing therapy outside of hospital is consistent with pt's wishes for comfort measures and would recommend resuming home hospice services as pt unlikely to regain strength and should focus more on quality of life    # PAULINO on CKD4  - in the setting of dehydration, urinary retention  - figueroa to be placed.  If unable, will consult urology  - monitoring with hydration  - nephrology consulted - changed fluids to bicarb  - monitor electrolytes    # Proteus bacteremia  - 2/2 urinary retention  - started on meropenem  - ID consulted - likely no IV abx at discharge as not compatible with hospice. Will await sensitivities and likely discharge with PO abx    # Anion gap metabolic acidosis  - due to PAULINO  - on bicarb drip  - nephrology consulted    # DM2  - cont current regimen    Disposition: Pt with progressive weakness and dehydration resulting in a syncopal episode. Blood cultures positive. Likely urinary source. On IV meropenem, but plan will be PO abx at discharge. Family aware that this may not be definitive but do not want IV abx. US shows severe urinary retention and bilateral hydronephrosis. Lopez to be placed. Possible urology consult if nursing unable to place in setting of his hx of urethral stricture. Plan is home with hospice at discharge.  Possible discharge in next 1-2 days once culture sensitivities are back      Jaret Caraballo DO  Internal Medicine   Hospitalist    >35 minutes in total care time

## 2022-06-13 NOTE — CARE COORDINATION
MARTY FROM [facility] HERE TO SEE PT(PHONE [phone]). HE WILL BE REACHING OUT TO FAMILY TO SEE HOW THEY WOULD LIKE TO PROCEED. [facility]. AWARE DRS ARE AWAITING FAMILY DECISIONS FOR TREATMENT AND WHEN TO DISCHARGE. MARTY TO CALL LSW ONCE HE SPEAKS WITH FAMILY.

## 2022-06-13 NOTE — PROGRESS NOTES
Physical Therapy Med Surg Daily Treatment Note  Facility/Department: [facility] TELEMETRY  [id number]       NAME: Deep Adams  : 1932 (80 y.o.)  MRN: [id number]  CODE STATUS: DNR-CCA    Date of Service: 2022    Patient Diagnosis(es): Hyperkalemia [E87.5]  Hyponatremia [E87.1]  Uremia [N19]  Obstructive uropathy [N13.9]  Elevated troponin [R77.8]  Acute kidney injury superimposed on CKD ([address] [N17.9, N18.9]   Chief Complaint   Patient presents with    Altered Mental Status     Patient Active Problem List    Diagnosis Date Noted    Syncope and collapse 2022    Hydronephrosis     General weakness 2020    Chronic kidney disease, stage IV (severe) ([address] 2020    Anemia of chronic renal failure 2020    Ataxic gait     Dementia due to Alzheimer's disease ([address]     Bradycardia 10/03/2019    Fall at home 10/03/2019    Hyponatremia 10/03/2019    Hyperkalemia 10/03/2019    Hypotension 10/03/2019    [facility]-fib [facility])     Hypertension     Anemia in CKD (chronic kidney disease)     Gram negative sepsis ([facility])     Klebsiella pneumoniae sepsis ([facility])     Uncontrolled type 2 diabetes mellitus with hyperglycemia ([address]     Pneumonia 2019        Past Medical History:   Diagnosis Date    Acute metabolic encephalopathy     Anemia in CKD (chronic kidney disease)     Blind right eye     Chronic hyponatremia     CKD (chronic kidney disease) stage 4, GFR 15-29 ml/min ([facility])     Diabetes mellitus ([address]     Hypertension     Kidney stone     Klebsiella pneumoniae sepsis (HCC)     Mixed hyperlipidemia     Obstructive uropathy     Paroxysmal A-fib (HCC)     Pneumonia     Sacral ulcer ([address]     Urethral stricture     UTI due to Klebsiella species      Past Surgical History:   Procedure Laterality Date    EYE SURGERY      KIDNEY STONE SURGERY              Restrictions:fall risk       SUBJECTIVE:   Subjective: \"I'd like to move a litlte\"    Pain  Pain: reports pain from abdomen to groin. not rated. OBJECTIVE:   Orientation  Overall Orientation Status: Within Functional Limits  Cognition  Overall Cognitive Status: Exceptions  Arousal/Alertness: Appropriate responses to stimuli  Following Commands: Follows one step commands with repetition  Attention Span: Attends with cues to redirect  Memory: Appears intact    Bed Mobility Training  Bed Mobility Training: Yes  Overall Level of Assistance: Moderate assistance;Assist X2  Interventions: Tactile cues; Weight shifting training/pressure relief  Rolling: Moderate assistance;Assist X2  Supine to Sit: Moderate assistance;Assist X2  Scooting: Moderate assistance;Assist X2  Duration: 10    Transfer Training  Transfer Training: Yes  Overall Level of Assistance: Moderate assistance;Assist X2  Interventions: Weight shifting training/pressure relief; Tactile cues  Sit to Stand: Moderate assistance;Assist X2  Stand Pivot Transfers: Moderate assistance;Assist X2  Duration: 13    Gait Training: No                              Vitals  SpO2: 99 %  O2 Device: None (Room air)          ASSESSMENT pt able to sit edge of bed with support. Pt needed +2 for transfer and was able to grab onto recliner chair but could not take steps to safely get in place. Physical assist to get pts hips to chair. Pt up in chair with alarm and legs reclined. Family present and reports they have bed all set up at home for pt as he was receiving hospice at home services.          Discharge Recommendations:  Continue to assess pending progress         Goals  Short Term Goals  Short term goal 1: Pt will demonstrate HEP CGA  Long Term Goals  Long term goal 1: Pt will demonstrate bed mobility CGA  Long term goal 2: Pt will demonstrate transfers CGA with safest AD  Long term goal 3: Pt will demonstrate amb >/= 10ft CGA with safest AD    PLAN    Plan: 1 time a day 3-6 times a week        AMPA (6 CLICK) BASIC MOBILITY  AM-PAC Inpatient Mobility Raw Score : 9

## 2022-06-13 NOTE — CARE COORDINATION
LSW spoke with Abigail Stark who said he has not been able to reach patient's family yet regarding continuation of hospice services.

## 2022-06-13 NOTE — FLOWSHEET NOTE
Am nursing  assessment completed. Pt :     Awake and resting in bed          Alert and oriented. CODE STATUS CC_A  Breakfast: set up with breakfast. Assist feed  RESP:    Even and non labored           Lung sounds:      Diminished                            Oxygen: room air  Complaints of: none  Pain: denies   IV: IV BICARB 100ml/hr . Iv x 2 right arm  TELE:  Dressing SRs:   Precautions:   contact           Falls: 70       Jimmy: 17  Chart and meds reviewed. Noted Labs:  Na 125 K 5.1 bun 107 Cr 4.26  hgb 8 hct 24.1  Plan for today:    Call light in reach. NOTES:  0911 complete bath and complete linen change. Incont large amount of urine. Noted large amount of purulent drainage from penis. 2801 to ultrasound. Spoke to pt wife on phone. Pt speaking to son on phone. [phone] Dr Kathi Peña by for rounds. Pt up to chair per therapy. message sent to Dr RG [facility] deeplocal to update ultrasound results. 12  [facility] deeplocal by for rounds. Wife and daughter at bedside. Electronically signed by Viviane Cook RN on 6/13/2022 at 2:54 PM       1524 two assist, return to bed. attempt figueroa x 1. urology consult. preventative mepilex to coccyx. Zinc cream to buttocks. 1600 figueroa per [place]. May irrigate as needed. 1200ml  thick tan/ creamy urine. Reports less pain in lower abdomen. Electronically signed by Viviane Cook RN on 6/13/2022 at 4:06 PM    1700 assist feed. Labs drawn. Urine sent.  Electronically signed by Viviane Cook RN on 6/13/2022 at 6:46 PM

## 2022-06-13 NOTE — CONSULTS
[address] INPATIENT  CONSULTATION NOTE                                                                                                                                                                                                Reason for Consult  Inability nursing staff to place Lopez catheter, urinary retention    History of Present Illness  66-year-old male admitted for encephalopathy  Patient was noted to have bilateral hydronephrosis and a full bladder  Nursing staff could not place Lopez catheter      Urologic Review of Systems/Symptoms  Other Urologic: Patient is a poor historian    Review of Systems    All 14 categories of Review of Systems otherwise reviewed no other findings reported.     Past Medical History:   Diagnosis Date    Acute metabolic encephalopathy     Anemia in CKD (chronic kidney disease)     Blind right eye     Chronic hyponatremia     CKD (chronic kidney disease) stage 4, GFR 15-29 ml/min ([facility])     Diabetes mellitus ([address]     Hypertension     Kidney stone     Klebsiella pneumoniae sepsis ([facility])     Mixed hyperlipidemia     Obstructive uropathy     Paroxysmal A-fib ([facility])     Pneumonia     Sacral ulcer ([address]     Urethral stricture     UTI due to Klebsiella species      Past Surgical History:   Procedure Laterality Date    EYE SURGERY      KIDNEY STONE SURGERY  1995     Social History     Socioeconomic History    Marital status:      Spouse name: None    Number of children: None    Years of education: None    Highest education level: None   Occupational History    None   Tobacco Use    Smoking status: Former Smoker    Smokeless tobacco: Never Used   Vaping Use    Vaping Use: Never used   Substance and Sexual Activity    Alcohol use: Not Currently    Drug use: Never    Sexual activity: None   Other Topics Concern    None   Social History Narrative    None     Social Determinants of Health     Financial Resource Strain:     Difficulty of Paying Living Expenses: Not on file   Food Insecurity:     Worried About [facility] in the Last Year: Not on file    Ran Out of Food in the Last Year: Not on file   Transportation Needs:     Lack of Transportation (Medical): Not on file    Lack of Transportation (Non-Medical): Not on file   Physical Activity:     Days of Exercise per Week: Not on file    Minutes of Exercise per Session: Not on file   Stress:     Feeling of Stress : Not on file   Social Connections:     Frequency of Communication with Friends and Family: Not on file    Frequency of Social Gatherings with Friends and Family: Not on file    Attends Shinto Services: Not on file    Active Member of [address] [place] [address] or Organizations: Not on file    Attends Club or Organization Meetings: Not on file    Marital Status: Not on file   Intimate Partner Violence:     Fear of Current or Ex-Partner: Not on file    Emotionally Abused: Not on file    Physically Abused: Not on file    Sexually Abused: Not on file   Housing Stability:     Unable to Pay for Housing in the Last Year: Not on file    Number of [place] in the Last Year: Not on file    Unstable Housing in the Last Year: Not on file     History reviewed. No pertinent family history.   Current Facility-Administered Medications   Medication Dose Route Frequency Provider Last Rate Last Admin    epoetin dahlia-epbx (RETACRIT) injection 10,000 Units  10,000 Units SubCUTAneous Q7 Days Aman Wei MD   10,000 Units at 06/13/22 1438    aspirin chewable tablet 81 mg  81 mg Oral Daily Nicoletto Bolsushiln-Roche, APRN - CNP   81 mg at 06/13/22 0851    insulin glargine (LANTUS) injection vial 5 Units  5 Units SubCUTAneous Nightly Nicoletto Bolzan-Roche, APRN - CNP   5 Units at 06/12/22 2123    vitamin B-12 (CYANOCOBALAMIN) tablet 500 mcg  500 mcg Oral Daily Nicoletto Bolzan-Roche, APRN - CNP   500 mcg at 06/13/22 0851    sodium chloride flush 0.9 % injection 5-40 mL  5-40 mL IntraVENous 2 times per day Nicoletto Bolsushiln-Roche, APRN - CNP   10 mL at 06/13/22 0851    sodium chloride flush 0.9 % injection 5-40 mL  5-40 mL IntraVENous PRN Nicoletto Bolzan-Roche, APRN - CNP        0.9 % sodium chloride infusion   IntraVENous PRN Nicoletto Giozan-Roche, APRN - CNP        heparin (porcine) injection 5,000 Units  5,000 Units SubCUTAneous TID Nicoletto Bolzan-Roche, APRN - CNP   5,000 Units at 06/13/22 1441    ondansetron (ZOFRAN-ODT) disintegrating tablet 4 mg  4 mg Oral Q8H PRN Nicoletto Bolzan-Roche, APRN - CNP        Or    ondansetron (ZOFRAN) injection 4 mg  4 mg IntraVENous Q6H PRN Nicoletto Bolzan-Roche, APRN - CNP   4 mg at 06/12/22 2109    polyethylene glycol (GLYCOLAX) packet 17 g  17 g Oral Daily PRN Festuso Toshian-Roche, APRN - CNP        acetaminophen (TYLENOL) tablet 650 mg  650 mg Oral Q6H PRN Nicoletto Bolzan-Roche, APRN - CNP   650 mg at 06/12/22 2106    Or    acetaminophen (TYLENOL) suppository 650 mg  650 mg Rectal Q6H PRN Nicoletto Giozan-Roche, APRN - CNP        sodium zirconium cyclosilicate (LOKELMA) oral suspension 10 g  10 g Oral TID Nicoletto Toshian-Roche, APRN - CNP   10 g at 06/13/22 1442    sodium bicarbonate 150 mEq in dextrose 5 % 1,000 mL infusion   IntraVENous Continuous Nathalia Velasco  mL/hr at 06/13/22 0457 New Bag at 06/13/22 0457    meropenem (MERREM) 500 mg IVPB (mini-bag)  500 mg IntraVENous Q12H [facility] DO   Stopped at 06/13/22 0830     Pcn [penicillins] and Hctz [hydrochlorothiazide]  All reviewed and verified by Dr Halie Thompson on today's visit    No results found for: PSA, PSADIA  [unfilled]    Physical Exam  Vitals:    06/13/22 0450 06/13/22 0740 06/13/22 1435 06/13/22 1448   BP: (!) 137/54 (!) 142/70  (!) 150/54   Pulse: 92 (!) 102  86   Resp:  16  16   Temp:  98 °F (36.7 °C)  98.2 °F (36.8 °C)   TempSrc:  Oral  Oral   SpO2: 92% 97% 99% 99%   Weight:  171 lb 8 oz (77.8 kg)     Height:         Constitutional: Patient not in distress  Physical exam otherwise unremarkable  Normal partially circumcised penis  Testis within normal limits no evidence of scrotal lesions. Procedure note  Complex catheter placement with filiform and followers and dilation to's [facility] followed by a 12 [facility] tip catheter  Assessment  Lopez catheter placed  Plan  Recommend keeping Lopez catheter in for at least 1 month  Following this the catheter can be changed to nursing facility or rehab recommend using a 14 [place] catheter for change  Greater 50% of 50 minutes spent evaluating patient face to face. Vanessa Singleton MD FACS    Please note this report has been partially produced using speech recognition software  And may cause contain errors related to that system including grammar, punctuation and spelling as well as words and phrases that may seem inappropriate. If there are questions or concerns please feel free to contact me to clarify.

## 2022-06-14 ENCOUNTER — APPOINTMENT (OUTPATIENT)
Dept: CT IMAGING | Age: 87
DRG: 690 | End: 2022-06-14
Payer: MEDICARE

## 2022-06-14 LAB
ANION GAP SERPL CALCULATED.3IONS-SCNC: 10 MEQ/L (ref 9–15)
ANION GAP SERPL CALCULATED.3IONS-SCNC: 13 MEQ/L (ref 9–15)
ANION GAP SERPL CALCULATED.3IONS-SCNC: 14 MEQ/L (ref 9–15)
BUN BLDV-MCNC: 86 MG/DL (ref 8–23)
BUN BLDV-MCNC: 91 MG/DL (ref 8–23)
BUN BLDV-MCNC: 97 MG/DL (ref 8–23)
CALCIUM SERPL-MCNC: 7.6 MG/DL (ref 8.5–9.9)
CALCIUM SERPL-MCNC: 8.1 MG/DL (ref 8.5–9.9)
CALCIUM SERPL-MCNC: 8.2 MG/DL (ref 8.5–9.9)
CHLORIDE BLD-SCNC: 94 MEQ/L (ref 95–107)
CHLORIDE BLD-SCNC: 96 MEQ/L (ref 95–107)
CHLORIDE BLD-SCNC: 96 MEQ/L (ref 95–107)
CO2: 22 MEQ/L (ref 20–31)
CO2: 24 MEQ/L (ref 20–31)
CO2: 25 MEQ/L (ref 20–31)
CREAT SERPL-MCNC: 3.54 MG/DL (ref 0.7–1.2)
CREAT SERPL-MCNC: 3.91 MG/DL (ref 0.7–1.2)
CREAT SERPL-MCNC: 4.18 MG/DL (ref 0.7–1.2)
CULTURE, BLOOD ID SENSITIVITY: ABNORMAL
GFR AFRICAN AMERICAN: 16.3
GFR AFRICAN AMERICAN: 17.6
GFR AFRICAN AMERICAN: 19.7
GFR NON-AFRICAN AMERICAN: 13.5
GFR NON-AFRICAN AMERICAN: 14.5
GFR NON-AFRICAN AMERICAN: 16.3
GLUCOSE BLD-MCNC: 264 MG/DL (ref 70–99)
GLUCOSE BLD-MCNC: 282 MG/DL (ref 70–99)
GLUCOSE BLD-MCNC: 299 MG/DL (ref 70–99)
MAGNESIUM: 1.5 MG/DL (ref 1.7–2.4)
MAGNESIUM: 1.7 MG/DL (ref 1.7–2.4)
ORGANISM: ABNORMAL
OSMOLALITY URINE: 316 MOSM/KG (ref 80–1300)
POTASSIUM REFLEX MAGNESIUM: 3.3 MEQ/L (ref 3.4–4.9)
POTASSIUM REFLEX MAGNESIUM: 3.4 MEQ/L (ref 3.4–4.9)
POTASSIUM REFLEX MAGNESIUM: 3.9 MEQ/L (ref 3.4–4.9)
SODIUM BLD-SCNC: 128 MEQ/L (ref 135–144)
SODIUM BLD-SCNC: 131 MEQ/L (ref 135–144)
SODIUM BLD-SCNC: 135 MEQ/L (ref 135–144)

## 2022-06-14 PROCEDURE — 83735 ASSAY OF MAGNESIUM: CPT

## 2022-06-14 PROCEDURE — 99231 SBSQ HOSP IP/OBS SF/LOW 25: CPT | Performed by: UROLOGY

## 2022-06-14 PROCEDURE — 6360000002 HC RX W HCPCS: Performed by: INTERNAL MEDICINE

## 2022-06-14 PROCEDURE — 80048 BASIC METABOLIC PNL TOTAL CA: CPT

## 2022-06-14 PROCEDURE — 2060000000 HC ICU INTERMEDIATE R&B

## 2022-06-14 PROCEDURE — 2580000003 HC RX 258: Performed by: INTERNAL MEDICINE

## 2022-06-14 PROCEDURE — 6370000000 HC RX 637 (ALT 250 FOR IP): Performed by: NURSE PRACTITIONER

## 2022-06-14 PROCEDURE — 99232 SBSQ HOSP IP/OBS MODERATE 35: CPT | Performed by: INTERNAL MEDICINE

## 2022-06-14 PROCEDURE — 36415 COLL VENOUS BLD VENIPUNCTURE: CPT

## 2022-06-14 PROCEDURE — 74176 CT ABD & PELVIS W/O CONTRAST: CPT

## 2022-06-14 PROCEDURE — 6360000002 HC RX W HCPCS: Performed by: NURSE PRACTITIONER

## 2022-06-14 PROCEDURE — 2580000003 HC RX 258: Performed by: NURSE PRACTITIONER

## 2022-06-14 RX ORDER — MAGNESIUM SULFATE 1 G/100ML
1000 INJECTION INTRAVENOUS PRN
Status: DISCONTINUED | OUTPATIENT
Start: 2022-06-14 | End: 2022-06-16 | Stop reason: HOSPADM

## 2022-06-14 RX ADMIN — INSULIN GLARGINE 5 UNITS: 100 INJECTION, SOLUTION SUBCUTANEOUS at 21:45

## 2022-06-14 RX ADMIN — SODIUM CHLORIDE, PRESERVATIVE FREE 10 ML: 5 INJECTION INTRAVENOUS at 12:25

## 2022-06-14 RX ADMIN — CEFTRIAXONE SODIUM 1000 MG: 1 INJECTION, POWDER, FOR SOLUTION INTRAMUSCULAR; INTRAVENOUS at 22:00

## 2022-06-14 RX ADMIN — ONDANSETRON 4 MG: 2 INJECTION INTRAMUSCULAR; INTRAVENOUS at 18:43

## 2022-06-14 RX ADMIN — ASPIRIN 81 MG: 81 TABLET, CHEWABLE ORAL at 12:23

## 2022-06-14 RX ADMIN — ONDANSETRON 4 MG: 2 INJECTION INTRAMUSCULAR; INTRAVENOUS at 12:23

## 2022-06-14 RX ADMIN — MEROPENEM 500 MG: 500 INJECTION, POWDER, FOR SOLUTION INTRAVENOUS at 05:40

## 2022-06-14 RX ADMIN — CYANOCOBALAMIN TAB 500 MCG 500 MCG: 500 TAB at 12:23

## 2022-06-14 ASSESSMENT — PAIN SCALES - GENERAL: PAINLEVEL_OUTOF10: 3

## 2022-06-14 NOTE — PROGRESS NOTES
Nephrology Progress Note    Assessment:  80 y.o. male with history s/f CKD stage IV, chronic hydronephrosis, A.fib, T2DM, HTN, HLD who presented for AMS.    1. PAULINO on CKD stage IV: likely 2/2 obstructive nephropathy +/-  hypotension, BP borderline low on presentation, Scr 4, baseline scr 2.9-3 w/ eGFR high teens, risk factors of CKD include T2DM, HTN, obstructive uropathy (has severe bilateral hydronephrosis which is chronic)  2. Hyperkalemia: improved w/ medical management   3. Hyponatremia  4. Metabolic acidosis: 2/2 renal failure, chronic, dating as far back as 2019, also w/ mild lactic acidosis, corrected  5. Hypoalbuminemia  6. Anemia  7.  Urinary retention: urology onboard, s/p figueroa placement       Plan:  - stopping bicarb gtt   - monitor acidosis as may likely need to still be discharged on PO bicarb   - continue ZACK during admission  - family decision pending regarding continuation of hospice services   - monitor function, improving w/ fluids and figueroa placement     Patient Active Problem List:     Pneumonia     Uncontrolled type 2 diabetes mellitus with hyperglycemia (HCC)     Gram negative sepsis ([address]     Klebsiella pneumoniae sepsis ([address]     Bradycardia     Fall at home     Hyponatremia     Hyperkalemia     Paroxysmal A-fib ([address]     Hypertension     Anemia in CKD (chronic kidney disease)     Hypotension     Ataxic gait     Dementia due to Alzheimer's disease ([address]     Chronic kidney disease, stage IV (severe) (HCC)     Anemia of chronic renal failure     General weakness     Hydronephrosis     Syncope and collapse      Subjective:  Admit Date: 6/11/2022    Interval History: Na improved, function improving now, CO2 normalized, figueroa placed by urology, was coughing up brown liquid felt to be old blood      Medications:  Scheduled Meds:   epoetin dahlia-epbx  10,000 Units SubCUTAneous Q7 Days    aspirin  81 mg Oral Daily    insulin glargine  5 Units SubCUTAneous Nightly    vitamin B-12  500 mcg Oral Daily    sodium chloride flush  5-40 mL IntraVENous 2 times per day    heparin (porcine)  5,000 Units SubCUTAneous TID    sodium zirconium cyclosilicate  10 g Oral TID    meropenem  500 mg IntraVENous Q12H     Continuous Infusions:   sodium chloride         CBC:   Recent Labs     06/11/22  2315 06/11/22  2315 06/12/22  0032 06/13/22  0458   WBC 14.6*  --   --  10.5   HGB 8.3*   < > 8.8* 8.0*     --   --  271    < > = values in this interval not displayed. CMP:    Recent Labs     06/13/22  1739 06/14/22  0118 06/14/22  0918   * 128* 131*   K 4.5 3.9 3.4   CL 95 96 94*   CO2 19* 22 24   * 97* 91*   CREATININE 4.62* 4.18* 3.91*   GLUCOSE 255* 264* 299*   CALCIUM 8.4* 8.1* 7.6*   LABGLOM 12.0* 13.5* 14.5*     Troponin:   Recent Labs     06/11/22 2315   TROPONINI 0.065*     BNP: No results for input(s): BNP in the last 72 hours. INR: No results for input(s): INR in the last 72 hours. Lipids:   Recent Labs     06/11/22 2315   LIPASE 213*     Liver:   Recent Labs     06/11/22 2315   AST 13   ALT 7   ALKPHOS 116*   PROT 8.3*   LABALBU 3.0*   BILITOT 0.3     Iron:  No results for input(s): IRONS, FERRITIN in the last 72 hours. Invalid input(s): LABIRONS  Urinalysis: No results for input(s): UA in the last 72 hours.     Objective:  Vitals: BP (!) 121/55   Pulse 84   Temp 99 °F (37.2 °C) (Oral)   Resp 16   Ht 5' 11\" (1.803 m)   Wt 171 lb 8 oz (77.8 kg)   SpO2 92%   BMI 23.92 kg/m²    Wt Readings from Last 3 Encounters:   06/13/22 171 lb 8 oz (77.8 kg)   01/18/22 175 lb (79.4 kg)   12/28/20 150 lb (68 kg)      24HR INTAKE/OUTPUT:      Intake/Output Summary (Last 24 hours) at 6/14/2022 1146  Last data filed at 6/14/2022 0617  Gross per 24 hour   Intake 2663.21 ml   Output 2850 ml   Net -186.79 ml       General: alert, in no apparent distress  HEENT: normocephalic, atraumatic, anicteric  Neck: supple, no mass  Lungs: non-labored respirations, clear to auscultation bilaterally  Heart: regular rate and rhythm, no murmurs or rubs  Abdomen: soft, non-tender, non-distended  Ext: no cyanosis, no peripheral edema  Neuro: responsive, follows commands         Electronically signed by Yunior Guzmán MD, MD

## 2022-06-14 NOTE — PROGRESS NOTES
Physical Therapy Missed Treatment   Facility/Department: [facility] MED SURG [id number]    NAME: Marlon Arrington    : 1932 (80 y.o.)  MRN: [id number]    Account: [de-identified]  Gender: male    Chart reviewed, attempted PT at 65. Patient unavailable 2° to:    [] Hold per nsg request    [x] Pt declined pt resting in bed, pt not agreeable to participate in therapy this date, education and encouragement given, pt still unwilling. [] Pt. . off floor for test/procedure. [] Pt. Unavailable       Will attempt PT treatment again at earliest convenience.       Electronically signed by Dossie Kocher, PTA on 22 at 11:26 AM EDT

## 2022-06-14 NOTE — PROGRESS NOTES
Department of Internal Medicine  General Internal Medicine  Attending Progress Note      SUBJECTIVE:  Pt seen and examined. Hard of hearing. No complaints. States lower abd pain improved. Lopez placed by urology. Awaiting culture results for abx recs    OBJECTIVE      Medications    Current Facility-Administered Medications: magnesium sulfate 1000 mg in dextrose 5% 100 mL IVPB, 1,000 mg, IntraVENous, PRN  epoetin dahlia-epbx (RETACRIT) injection 10,000 Units, 10,000 Units, SubCUTAneous, Q7 Days  aspirin chewable tablet 81 mg, 81 mg, Oral, Daily  insulin glargine (LANTUS) injection vial 5 Units, 5 Units, SubCUTAneous, Nightly  vitamin B-12 (CYANOCOBALAMIN) tablet 500 mcg, 500 mcg, Oral, Daily  sodium chloride flush 0.9 % injection 5-40 mL, 5-40 mL, IntraVENous, 2 times per day  sodium chloride flush 0.9 % injection 5-40 mL, 5-40 mL, IntraVENous, PRN  0.9 % sodium chloride infusion, , IntraVENous, PRN  heparin (porcine) injection 5,000 Units, 5,000 Units, SubCUTAneous, TID  ondansetron (ZOFRAN-ODT) disintegrating tablet 4 mg, 4 mg, Oral, Q8H PRN **OR** ondansetron (ZOFRAN) injection 4 mg, 4 mg, IntraVENous, Q6H PRN  polyethylene glycol (GLYCOLAX) packet 17 g, 17 g, Oral, Daily PRN  acetaminophen (TYLENOL) tablet 650 mg, 650 mg, Oral, Q6H PRN **OR** acetaminophen (TYLENOL) suppository 650 mg, 650 mg, Rectal, Q6H PRN  sodium zirconium cyclosilicate (LOKELMA) oral suspension 10 g, 10 g, Oral, TID  meropenem (MERREM) 500 mg IVPB (mini-bag), 500 mg, IntraVENous, Q12H  Physical    VITALS:  BP (!) 121/55   Pulse 84   Temp 99 °F (37.2 °C) (Oral)   Resp 16   Ht 5' 11\" (1.803 m)   Wt 171 lb 8 oz (77.8 kg)   SpO2 92%   BMI 23.92 kg/m²   Constitutional: Awake and alert in no acute distress.  Lying in bed comfortably, hard of hearing  Head: Normocephalic, atraumatic  Eyes: EOMI, PERRLA  ENT: dry mucous membranes  Neck: neck supple, trachea midline  Lungs: Good inspiratory effort, no wheeze, no rhonchi, no rales  Heart: RRR, normal S1 and S2  GI: Soft, non-distended, non tender, no guarding, no rebound, +BS  MSK: no edema noted  Skin: warm, dry     Data    CBC:   Lab Results   Component Value Date    WBC 10.5 06/13/2022    RBC 2.52 06/13/2022    HGB 8.0 06/13/2022    HCT 24.1 06/13/2022    MCV 95.6 06/13/2022    MCH 31.6 06/13/2022    MCHC 33.1 06/13/2022    RDW 14.6 06/13/2022     06/13/2022    MPV 7.0 01/08/2021     CMP:    Lab Results   Component Value Date     06/14/2022    K 3.4 06/14/2022    CL 94 06/14/2022    CO2 24 06/14/2022    BUN 91 06/14/2022    CREATININE 3.91 06/14/2022    GFRAA 17.6 06/14/2022    LABGLOM 14.5 06/14/2022    GLUCOSE 299 06/14/2022    PROT 8.3 06/11/2022    LABALBU 3.0 06/11/2022    CALCIUM 7.6 06/14/2022    BILITOT 0.3 06/11/2022    ALKPHOS 116 06/11/2022    AST 13 06/11/2022    ALT 7 06/11/2022       ASSESSMENT AND PLAN      # Syncope likely orthostatic in the setting of dehydration  - pt with hyponatremia and appears dehydrated with dry mucous membranes  - hydrating with IVF  - pt is current with hospice at home. Hospice consulted  - DNR-CCA  - PT/OT ordered on admission. [id number] [address] for therapy while admitted, but doubt that continuing therapy outside of hospital is consistent with pt's wishes for comfort measures and would recommend resuming home hospice services as pt unlikely to regain strength and should focus more on quality of life    # PAULINO on CKD4- improving  - in the setting of dehydration, urinary retention  - figueroa placed by urology to be left in at discharge  - monitoring with hydration  - nephrology consulted - fluids per recs  - monitor electrolytes    # Proteus bacteremia  - 2/2 urinary retention  - started on meropenem  - ID consulted - likely no IV abx at discharge as not compatible with hospice.  Will await sensitivities and likely discharge with PO abx    # Anion gap metabolic acidosis  - due to PAULINO  - on bicarb drip  - nephrology consulted    # DM2  - cont current regimen    Disposition: Pt with progressive weakness and dehydration resulting in a syncopal episode. Blood cultures positive. Likely urinary source. On IV meropenem, but plan will be PO abx at discharge. Family aware that this may not be definitive but do not want IV abx. US shows severe urinary retention and bilateral hydronephrosis. Lopez placed by urology in setting of his hx of urethral stricture. Plan is home with hospice at discharge.  Possible discharge in next 1-2 days once culture sensitivities are back      Sierra Enriquez,   Internal Medicine   Hospitalist    >35 minutes in total care time

## 2022-06-14 NOTE — FLOWSHEET NOTE
The patient vomited a brown liquid that looks like old blood. Dr. RG [facility] made aware of change in patient condition. Pt is awake in bed, in a high semi fowlers position /55, SPO2 93, HR 84, MAP 75.

## 2022-06-14 NOTE — PROGRESS NOTES
Physician Progress Note      Irene Victor  CSN #:                  [id number]  :                       1932  ADMIT DATE:       2022 11:07 PM  DISCH DATE:  RESPONDING  PROVIDER #:        Leopold Allis DO          QUERY TEXT:    Pt admitted \"with progressive weakness and dehydration resulting in a syncopal   episode. Blood cultures positive. Likely urinary source. \" Pt noted to have   WBC 14.6  T 100.8 axillary  P   and PAULINO. If possible, please document in   the progress notes and discharge summary if you are evaluating and /or   treating any of the following: The medical record reflects the following:  Risk Factors: age 80  DM2  CKD4  urethral stricture  Clinical Indicators:   [facility]: Proteus bacteremia  Blood cultures positive. Likely urinary   source. On IV meropenem, but plan will be PO abx at discharge. US shows   severe urinary retention and bilateral hydronephrosis. Treatment: blood cultures  US  Lopez placed  IV Merrem  ID  nephrology    urology    Andreia Owusu RN CCDS  [phone]  Options provided:  -- Sepsis, present on admission  -- Proteus bacteremia without Sepsis  -- Other - I will add my own diagnosis  -- Disagree - Not applicable / Not valid  -- Disagree - Clinically unable to determine / Unknown  -- Refer to Clinical Documentation Reviewer    PROVIDER RESPONSE TEXT:    This patient has Proteus bacteremia without Sepsis.     Query created by: Edmond Wagner on 2022 1:39 PM      Electronically signed by:  Leopold Allis DO 2022 5:14 PM

## 2022-06-14 NOTE — PROGRESS NOTES
Infectious Disease     Patient Name: Mel Jiang  Date: 6/14/2022  YOB: 1932  Medical Record Number: [id number]        Chief Complaint   Patient presents with    Altered Mental Status          History of Present Illness:  Patient here previously on hospice patient was found confused unresponsive. They reversed CODE STATUS. Found to have hyponatremia and hyperkalemia renal failure. Blood cultures became positive. Gram-negative rods. History of UTIs in the past.  He has some abdominal pain right suprapubic right lower quadrant area,    Patient had Lopez placed by urology last evening. Today family says he is a little bit more lethargic he is having nausea with vomiting today          Review of Systems: Patient poor historian at baseline due to dementia          History reviewed. No pertinent family history. Physical Exam:     Blood pressure (!) 121/58, pulse 87, temperature 99 °F (37.2 °C), temperature source Oral, resp. rate 16, height 5' 11\" (1.803 m), weight 171 lb 8 oz (77.8 kg), SpO2 92 %. General: . NAD  Skin: no new rashes  HEENT:  Neck is supple, No subconjunctival hemorrhages, no oral exudates  Heart: S1 S2  Lungs: clear bilaterally   Abdomen: Right lower quadrant/suprapubic tenderness,   Back :no CVA tenderness  Extrem: No edema, non tender  Neuro exam: CN II-XII intact  Psych: cooperative    Labs: I have reviewed all lab results by electronic record, including most recent CBC, metabolic panel, and pertinent abnormalities were addressed from an infectious disease perspective. Trends are being monitored over time.    Lab Results   Component Value Date    WBC 10.5 06/13/2022    HGB 8.0 (L) 06/13/2022    HCT 24.1 (L) 06/13/2022    MCV 95.6 06/13/2022     06/13/2022     Lab Results   Component Value Date     06/14/2022    K 3.4 06/14/2022    CL 94 06/14/2022    CO2 24 06/14/2022    BUN 91 06/14/2022    CREATININE 3.91 06/14/2022    GLUCOSE 299 06/14/2022    CALCIUM 7.6 06/14/2022        Radiology:  I have reviewed imaging results per electronic record and most pertinent abnormalities are being addressed from an infectious disease standpoint. ASSESSMENT:  Gram-negative bacteremia  Hydronephrosis  Acute kidney injury   altered mental status    Family's desires further work-up.   Presumably Proteus is from the urinary tract given the urinary retention urine cultures were done after admission so will likely be biased  PLAN:  Continue antibiotics, change to Rocephin  CT scan abdomen pelvis will be limited without contrast but will order given worsening symptoms

## 2022-06-14 NOTE — CARE COORDINATION
LSW spoke with Bradley Segovia from [facility] who said he has still been unable to reach patient's spouse to discuss continuation of hospice services. LSW met with patient's spouse and daughter who confirmed they would like patient to return home with [facility]. Bradley Segovia informed.

## 2022-06-14 NOTE — PROGRESS NOTES
UROLOGY CONSULT Progress Note-Dr Foreign Calhoun    6/14/2022   1:08 PM    Name:  Omaira Braga  MRN:    [id number]     [place]:     [de-identified]   Room:[address] Day: 2     Admit Date: 6/11/2022 11:07 PM  PCP: Ernestine Tejeda MD    Subjective:     C/C: Urology placed Lopez catheter after urethral dilation  Chief Complaint   Patient presents with    Altered Mental Status       Interval History: Status: No change    Past Medical History:   Diagnosis Date    Acute metabolic encephalopathy     Anemia in CKD (chronic kidney disease)     Blind right eye     Chronic hyponatremia     CKD (chronic kidney disease) stage 4, GFR 15-29 ml/min ([facility]     Diabetes mellitus [facility]     Hypertension     Kidney stone     Klebsiella pneumoniae sepsis ([facility])     Mixed hyperlipidemia     Obstructive uropathy     Paroxysmal A-fib ([facility])     Pneumonia     Sacral ulcer ([facility]     Urethral stricture     UTI due to Klebsiella species        ROS:  Review of Systems    Medications: Allergies:    Allergies   Allergen Reactions    Pcn [Penicillins]     Hctz [Hydrochlorothiazide] Rash       Current Meds: epoetin dahlia-epbx (RETACRIT) injection 10,000 Units, Q7 Days  aspirin chewable tablet 81 mg, Daily  insulin glargine (LANTUS) injection vial 5 Units, Nightly  vitamin B-12 (CYANOCOBALAMIN) tablet 500 mcg, Daily  sodium chloride flush 0.9 % injection 5-40 mL, 2 times per day  sodium chloride flush 0.9 % injection 5-40 mL, PRN  0.9 % sodium chloride infusion, PRN  heparin (porcine) injection 5,000 Units, TID  ondansetron (ZOFRAN-ODT) disintegrating tablet 4 mg, Q8H PRN   Or  ondansetron (ZOFRAN) injection 4 mg, Q6H PRN  polyethylene glycol (GLYCOLAX) packet 17 g, Daily PRN  acetaminophen (TYLENOL) tablet 650 mg, Q6H PRN   Or  acetaminophen (TYLENOL) suppository 650 mg, Q6H PRN  sodium zirconium cyclosilicate (LOKELMA) oral suspension 10 g, TID  meropenem (MERREM) 500 mg IVPB (mini-bag), Q12H        Data:     Code Status: DNR-CCA    History reviewed. No pertinent family history. Social History     Socioeconomic History    Marital status:      Spouse name: Not on file    Number of children: Not on file    Years of education: Not on file    Highest education level: Not on file   Occupational History    Not on file   Tobacco Use    Smoking status: Former Smoker    Smokeless tobacco: Never Used   Vaping Use    Vaping Use: Never used   Substance and Sexual Activity    Alcohol use: Not Currently    Drug use: Never    Sexual activity: Not on file   Other Topics Concern    Not on file   Social History Narrative    Not on file     Social Determinants of Health     Financial Resource Strain:     Difficulty of Paying Living Expenses: Not on file   Food Insecurity:     Worried About [address] in the Last Year: Not on file    [address] [employer] in the Last Year: Not on file   Transportation Needs:     Lack of Transportation (Medical): Not on file    Lack of Transportation (Non-Medical):  Not on file   Physical Activity:     Days of Exercise per Week: Not on file    Minutes of Exercise per Session: Not on file   Stress:     Feeling of Stress : Not on file   Social Connections:     Frequency of Communication with Friends and Family: Not on file    Frequency of Social Gatherings with Friends and Family: Not on file    Attends Mandaeism Services: Not on file    Active Member of [address] [place] [address] or Organizations: Not on file    Attends Club or Organization Meetings: Not on file    Marital Status: Not on file   Intimate Partner Violence:     Fear of Current or Ex-Partner: Not on file    Emotionally Abused: Not on file    Physically Abused: Not on file    Sexually Abused: Not on file   Housing Stability:     Unable to Pay for Housing in the Last Year: Not on file    Number of [place] in the Last Year: Not on file    Unstable Housing in the Last Year: Not on file       Vitals:  BP (!) 121/55   Pulse 84   Temp 99 °F (37.2 °C) (Oral)   Resp 16   Ht 5' 11\" (1.803 m)   Wt 171 lb 8 oz (77.8 kg)   SpO2 92%   BMI 23.92 kg/m²   Temp (24hrs), Av.1 °F (37.3 °C), Min:98.2 °F (36.8 °C), Max:100.2 °F (37.9 °C)    Recent Labs     22  0032 2237 228   POCGLU 294* 298* 228* 282*       I/O (24Hr): Intake/Output Summary (Last 24 hours) at 2022 1308  Last data filed at 2022 0617  Gross per 24 hour   Intake 2423.21 ml   Output 2850 ml   Net -426.79 ml       Labs:  Hematology:  Recent Labs     22  0458   WBC 10.5   HGB 8.0*   HCT 24.1*        Chemistry:  Recent Labs     22  2315 22  2315 222 22  0917 22  0918   *  --   --    < > 131*   K 6.0*  --   --    < > 3.4     --   --    < > 94*   CO2 11*  --   --    < > 24   GLUCOSE 293*  --   --    < > 299*   *  --   --    < > 91*   CREATININE 4.04*  --  4.0*   < > 3.91*   MG  --   --   --   --  1.5*   ANIONGAP 13  --   --    < > 13   LABGLOM 14.0*   < > 14*   < > 14.5*   GFRAA 16.9*   < > 17*   < > 17.6*   CALCIUM 9.5  --   --    < > 7.6*   CAION  --   --  1.28  --   --    TROPONINI 0.065*  --   --   --   --     < > = values in this interval not displayed. Urine Culture   Lab Results   Component Value Date    LABURIN No growth 24 hours 2020         Physical Examination:        Physical Exam   Patient's somnolent  Lopez catheter draining clear urine  No evidence of bleeding at the penis    Assessment:        Stable urologically  Active Hospital Problems    Diagnosis Date Noted    Obstructive uropathy [N13.9]      Priority: Medium    Gram-negative bacteremia [R78.81]      Priority: Medium    Acute kidney injury superimposed on CKD ([facility] [N17.9, N18.9]      Priority: Medium    Hyponatremia [E87.1] 10/03/2019         Plan:        1. Patient to have Lopez catheter in at least 1 month  2.  Patient can follow-up in our office in 1 month for catheter change or it could be changed in nursing facility/hospice      Electronically signed by Tucker Hernadez MD on 6/14/2022 at 1:08 PM

## 2022-06-14 NOTE — CONSULTS
Infectious Disease     Patient Name: Mel Jiang  Date: 6/13/2022  YOB: 1932  Medical Record Number: [id number]        Chief Complaint   Patient presents with    Altered Mental Status          History of Present Illness:  Patient here previously on hospice patient was found confused unresponsive. They reversed CODE STATUS. Found to have hyponatremia and hyperkalemia renal failure. Blood cultures became positive. Gram-negative rods. History of UTIs in the past.  He has some abdominal pain right suprapubic right lower quadrant area,          Review of Systems: Patient poor historian at baseline due to dementia          Social History     Tobacco Use    Smoking status: Former Smoker    Smokeless tobacco: Never Used   Vaping Use    Vaping Use: Never used   Substance Use Topics    Alcohol use: Not Currently    Drug use: Never         Past Medical History:   Diagnosis Date    Acute metabolic encephalopathy     Anemia in CKD (chronic kidney disease)     Blind right eye     Chronic hyponatremia     CKD (chronic kidney disease) stage 4, GFR 15-29 ml/min [facility]     Diabetes mellitus ([address]     Hypertension     Kidney stone     Klebsiella pneumoniae sepsis ([facility]     Mixed hyperlipidemia     Obstructive uropathy     Paroxysmal A-fib ([facility]     Pneumonia     Sacral ulcer ([address]     Urethral stricture     UTI due to Klebsiella species            Past Surgical History:   Procedure Laterality Date    EYE SURGERY      KIDNEY STONE SURGERY  1995         No current facility-administered medications on file prior to encounter.      Current Outpatient Medications on File Prior to Encounter   Medication Sig Dispense Refill    sodium bicarbonate 650 MG tablet Take 2 tablets by mouth 3 times daily 90 tablet 3    alfuzosin (UROXATRAL) 10 MG extended release tablet Take 1 tablet by mouth daily (with breakfast) 30 tablet 3    aspirin 81 MG tablet Take 81 mg by mouth daily      vitamin B-12 (CYANOCOBALAMIN) 500 MCG tablet Take 500 mcg by mouth daily      insulin glargine (LANTUS SOLOSTAR) 100 UNIT/ML injection pen Inject 10 Units into the skin nightly 5 pen 3    insulin lispro (HUMALOG KWIKPEN) 100 UNIT/ML pen Glucose 100-120 no insulin, 121-140 2 units, 141-160 4 units, 161- 180 6 units, 181-200 8 units, 200 or higher 10 units 5 pen 3       Allergies   Allergen Reactions    Pcn [Penicillins]     Hctz [Hydrochlorothiazide] Rash         History reviewed. No pertinent family history. Physical Exam:     Blood pressure (!) 138/51, pulse (!) 108, temperature 98.2 °F (36.8 °C), temperature source Oral, resp. rate 16, height 5' 11\" (1.803 m), weight 171 lb 8 oz (77.8 kg), SpO2 100 %. General: Patient appears ok at the present time. NAD  Skin: no new rashes  HEENT:  Neck is supple, No subconjunctival hemorrhages, no oral exudates  Heart: S1 S2  Lungs: clear bilaterally   Abdomen: Right lower quadrant/suprapubic tenderness,   Back :no CVA tenderness  Extrem: No edema, non tender  Neuro exam: CN II-XII intact  Psych: cooperative    Labs: I have reviewed all lab results by electronic record, including most recent CBC, metabolic panel, and pertinent abnormalities were addressed from an infectious disease perspective. Trends are being monitored over time. Lab Results   Component Value Date    WBC 10.5 06/13/2022    HGB 8.0 (L) 06/13/2022    HCT 24.1 (L) 06/13/2022    MCV 95.6 06/13/2022     06/13/2022     Lab Results   Component Value Date     06/13/2022    K 4.5 06/13/2022    CL 95 06/13/2022    CO2 19 06/13/2022     06/13/2022    CREATININE 4.62 06/13/2022    GLUCOSE 255 06/13/2022    CALCIUM 8.4 06/13/2022        Radiology:  I have reviewed imaging results per electronic record and most pertinent abnormalities are being addressed from an infectious disease standpoint.              ASSESSMENT:  Gram-negative bacteremia  Hydronephrosis  Acute kidney injury   altered mental status    Family's desires further work-up  PLAN:  Continue antibiotics  Urology to see

## 2022-06-14 NOTE — PROGRESS NOTES
Pt coughing up/vomiting small amounts of dark brown liquid, resembling old blood. Pt placed in a high fowlers position. VSS /58 HR 94 TEMP 99.8 SPO2 93% Dr. Lou Medrano notified. No new orders at this time. This RN will pass info along to dayshift RN. Will monitor pt and labs closely.      Electronically signed by Angelo Ramos RN on 6/14/2022 at 6:07 AM

## 2022-06-15 LAB
ANION GAP SERPL CALCULATED.3IONS-SCNC: 11 MEQ/L (ref 9–15)
ANION GAP SERPL CALCULATED.3IONS-SCNC: 12 MEQ/L (ref 9–15)
ANION GAP SERPL CALCULATED.3IONS-SCNC: 13 MEQ/L (ref 9–15)
BUN BLDV-MCNC: 78 MG/DL (ref 8–23)
BUN BLDV-MCNC: 80 MG/DL (ref 8–23)
BUN BLDV-MCNC: 84 MG/DL (ref 8–23)
CALCIUM SERPL-MCNC: 8.1 MG/DL (ref 8.5–9.9)
CALCIUM SERPL-MCNC: 8.1 MG/DL (ref 8.5–9.9)
CALCIUM SERPL-MCNC: 8.3 MG/DL (ref 8.5–9.9)
CHLORIDE BLD-SCNC: 92 MEQ/L (ref 95–107)
CHLORIDE BLD-SCNC: 94 MEQ/L (ref 95–107)
CHLORIDE BLD-SCNC: 96 MEQ/L (ref 95–107)
CO2: 26 MEQ/L (ref 20–31)
CO2: 26 MEQ/L (ref 20–31)
CO2: 27 MEQ/L (ref 20–31)
CREAT SERPL-MCNC: 3.23 MG/DL (ref 0.7–1.2)
CREAT SERPL-MCNC: 3.35 MG/DL (ref 0.7–1.2)
CREAT SERPL-MCNC: 3.62 MG/DL (ref 0.7–1.2)
GFR AFRICAN AMERICAN: 19.2
GFR AFRICAN AMERICAN: 21
GFR AFRICAN AMERICAN: 21.9
GFR NON-AFRICAN AMERICAN: 15.9
GFR NON-AFRICAN AMERICAN: 17.4
GFR NON-AFRICAN AMERICAN: 18.1
GLUCOSE BLD-MCNC: 227 MG/DL (ref 70–99)
GLUCOSE BLD-MCNC: 288 MG/DL (ref 70–99)
GLUCOSE BLD-MCNC: 343 MG/DL (ref 70–99)
GLUCOSE BLD-MCNC: 406 MG/DL (ref 70–99)
MAGNESIUM: 1.6 MG/DL (ref 1.7–2.4)
MAGNESIUM: 1.7 MG/DL (ref 1.7–2.4)
ORGANISM: ABNORMAL
PERFORMED ON: ABNORMAL
POTASSIUM REFLEX MAGNESIUM: 3.2 MEQ/L (ref 3.4–4.9)
POTASSIUM REFLEX MAGNESIUM: 3.4 MEQ/L (ref 3.4–4.9)
POTASSIUM REFLEX MAGNESIUM: 3.6 MEQ/L (ref 3.4–4.9)
SODIUM BLD-SCNC: 130 MEQ/L (ref 135–144)
SODIUM BLD-SCNC: 133 MEQ/L (ref 135–144)
SODIUM BLD-SCNC: 134 MEQ/L (ref 135–144)
URINE CULTURE, ROUTINE: ABNORMAL
URINE CULTURE, ROUTINE: ABNORMAL

## 2022-06-15 PROCEDURE — 2060000000 HC ICU INTERMEDIATE R&B

## 2022-06-15 PROCEDURE — 6370000000 HC RX 637 (ALT 250 FOR IP): Performed by: INTERNAL MEDICINE

## 2022-06-15 PROCEDURE — 97535 SELF CARE MNGMENT TRAINING: CPT

## 2022-06-15 PROCEDURE — 99232 SBSQ HOSP IP/OBS MODERATE 35: CPT | Performed by: INTERNAL MEDICINE

## 2022-06-15 PROCEDURE — 83735 ASSAY OF MAGNESIUM: CPT

## 2022-06-15 PROCEDURE — 36415 COLL VENOUS BLD VENIPUNCTURE: CPT

## 2022-06-15 PROCEDURE — 80048 BASIC METABOLIC PNL TOTAL CA: CPT

## 2022-06-15 PROCEDURE — 2580000003 HC RX 258: Performed by: NURSE PRACTITIONER

## 2022-06-15 PROCEDURE — 6360000002 HC RX W HCPCS: Performed by: NURSE PRACTITIONER

## 2022-06-15 PROCEDURE — 97110 THERAPEUTIC EXERCISES: CPT

## 2022-06-15 PROCEDURE — 6370000000 HC RX 637 (ALT 250 FOR IP): Performed by: NURSE PRACTITIONER

## 2022-06-15 RX ORDER — INSULIN LISPRO 100 [IU]/ML
0-9 INJECTION, SOLUTION INTRAVENOUS; SUBCUTANEOUS NIGHTLY
Status: DISCONTINUED | OUTPATIENT
Start: 2022-06-15 | End: 2022-06-16 | Stop reason: HOSPADM

## 2022-06-15 RX ORDER — INSULIN LISPRO 100 [IU]/ML
0-18 INJECTION, SOLUTION INTRAVENOUS; SUBCUTANEOUS
Status: DISCONTINUED | OUTPATIENT
Start: 2022-06-16 | End: 2022-06-16 | Stop reason: HOSPADM

## 2022-06-15 RX ORDER — DEXTROSE MONOHYDRATE 50 MG/ML
100 INJECTION, SOLUTION INTRAVENOUS PRN
Status: DISCONTINUED | OUTPATIENT
Start: 2022-06-15 | End: 2022-06-16 | Stop reason: HOSPADM

## 2022-06-15 RX ORDER — CIPROFLOXACIN 500 MG/1
500 TABLET, FILM COATED ORAL
Status: DISCONTINUED | OUTPATIENT
Start: 2022-06-15 | End: 2022-06-16 | Stop reason: HOSPADM

## 2022-06-15 RX ORDER — CIPROFLOXACIN 500 MG/1
500 TABLET, FILM COATED ORAL DAILY
Qty: 7 TABLET | Refills: 0 | Status: SHIPPED | OUTPATIENT
Start: 2022-06-15 | End: 2022-06-22

## 2022-06-15 RX ADMIN — HEPARIN SODIUM 5000 UNITS: 5000 INJECTION INTRAVENOUS; SUBCUTANEOUS at 21:43

## 2022-06-15 RX ADMIN — SODIUM CHLORIDE, PRESERVATIVE FREE 10 ML: 5 INJECTION INTRAVENOUS at 09:10

## 2022-06-15 RX ADMIN — ASPIRIN 81 MG: 81 TABLET, CHEWABLE ORAL at 09:10

## 2022-06-15 RX ADMIN — INSULIN LISPRO 9 UNITS: 100 INJECTION, SOLUTION INTRAVENOUS; SUBCUTANEOUS at 21:42

## 2022-06-15 RX ADMIN — INSULIN GLARGINE 5 UNITS: 100 INJECTION, SOLUTION SUBCUTANEOUS at 21:42

## 2022-06-15 RX ADMIN — CIPROFLOXACIN 500 MG: 500 TABLET, FILM COATED ORAL at 21:42

## 2022-06-15 RX ADMIN — CYANOCOBALAMIN TAB 500 MCG 500 MCG: 500 TAB at 09:10

## 2022-06-15 NOTE — PROGRESS NOTES
Infectious Disease     Patient Name: Kirstin Ding  Date: 6/15/2022  YOB: 1932  Medical Record Number: [id number]        Chief Complaint   Patient presents with    Altered Mental Status          History of Present Illness:  Patient here previously on hospice patient was found confused unresponsive. They reversed CODE STATUS. Found to have hyponatremia and hyperkalemia renal failure. Blood cultures became positive. Gram-negative rods. History of UTIs in the past.  He has some abdominal pain right suprapubic right lower quadrant area,    Patient had Lopez placed by urology last evening. Less nausea  No fevers        Review of Systems: Patient poor historian at baseline due to dementia          History reviewed. No pertinent family history. Physical Exam:     Blood pressure (!) 135/50, pulse 89, temperature 99.1 °F (37.3 °C), temperature source Oral, resp. rate 18, height 5' 11\" (1.803 m), weight 171 lb 8 oz (77.8 kg), SpO2 92 %. General: . NAD  Skin: no new rashes  HEENT:  Neck is supple, No subconjunctival hemorrhages, no oral exudates  Heart: S1 S2  Lungs: clear bilaterally   Abdomen: Right lower quadrant/suprapubic tenderness,   Back :no CVA tenderness  Extrem: No edema, non tender  Neuro exam: CN II-XII intact  Psych: cooperative    Labs: I have reviewed all lab results by electronic record, including most recent CBC, metabolic panel, and pertinent abnormalities were addressed from an infectious disease perspective. Trends are being monitored over time.    Lab Results   Component Value Date    WBC 10.5 06/13/2022    HGB 8.0 (L) 06/13/2022    HCT 24.1 (L) 06/13/2022    MCV 95.6 06/13/2022     06/13/2022     Lab Results   Component Value Date     06/15/2022    K 3.6 06/15/2022    CL 94 06/15/2022    CO2 26 06/15/2022    BUN 80 06/15/2022    CREATININE 3.35 06/15/2022    GLUCOSE 288 06/15/2022    CALCIUM 8.3 06/15/2022        Radiology:  I have reviewed imaging results per electronic record and most pertinent abnormalities are being addressed from an infectious disease standpoint. ASSESSMENT:  Gram-negative bacteremia  Hydronephrosis  Acute kidney injury   altered mental status    . Presumably Proteus is from the urinary tract given the urinary retention urine cultures were done after admission so will likely be biased. CT scan unrevealing for new process  PLAN:  Continue ceftriaxone  Ok for cipro when tolerating PO art discharge renally adjusted 500 mg once a day for 7 days  (QTc ok).

## 2022-06-15 NOTE — PROGRESS NOTES
Physician Progress Note      Ben Juárez  [facility] #:                  [id number]  :                       1932  ADMIT DATE:       2022 11:07 PM  [address] [place] DATE:  RESPONDING  PROVIDER #:        Bello Castro MD        QUERY TEXT:    Type of Encephalopathy: Please provide further specificity, if known. Clinical indicators include: encephalopathy, ckd, chronic kidney disease,   hyponatremia, sepsis, alcohol use  Options provided:  -- Anoxic/hypoxic encephalopathy  -- Metabolic encephalopathy  -- Toxic encephalopathy  -- Hepatic encephalopathy  -- Hypertensive encephalopathy  -- Other - I will add my own diagnosis  -- Disagree - Not applicable / Not valid  -- Disagree - Clinically Unable to determine / Unknown        PROVIDER RESPONSE TEXT:    Provider was unable to determine a response for this query.       Electronically signed by:  Bello Castro MD 6/15/2022 11:23 AM

## 2022-06-15 NOTE — CARE COORDINATION
Spoke with Kaylene Valencia from [facility] who said he spoke with patient's wife yesterday and confirmed they would like to continue with hospice services. Kaylene Valencia said they are ready for patient to resume services at any time. LSW to notify when ready for discharge. Informed by physician that patient will be discharged today. Kaylene Valencia updated and said he will send a nurse out to meet with patient's wife. Hospice will be arranging transportation home.

## 2022-06-15 NOTE — FLOWSHEET NOTE
Nursing assessment done. Medications given. Vitals stable  Heparin not given due to pt throwing up brown liquid. Lenn Emili  Not given due to pt potassium of 3.4      Pt is throwing up brown liquid  made aware. Pt had 5 seconds of PAF  made aware.

## 2022-06-15 NOTE — PROGRESS NOTES
Physical Therapy Med Surg Daily Treatment Note  Facility/Department: [address]  [facility]       NAME: Kirstin Ding  : 1932 (80 y.o.)  MRN: [id number]  CODE STATUS: DNR-CCA    Date of Service: 6/15/2022    Patient Diagnosis(es): Hyperkalemia [E87.5]  Hyponatremia [E87.1]  Uremia [N19]  Obstructive uropathy [N13.9]  Elevated troponin [R77.8]  Acute kidney injury superimposed on CKD ([address] [N17.9, N18.9]   Chief Complaint   Patient presents with    Altered Mental Status     Patient Active Problem List    Diagnosis Date Noted    Obstructive uropathy     Gram-negative bacteremia     Acute kidney injury superimposed on CKD ([address]     Syncope and collapse 2022    Hydronephrosis     General weakness 2020    Chronic kidney disease, stage IV (severe) ([address] 2020    Anemia of chronic renal failure 2020    Ataxic gait     Dementia due to Alzheimer's disease ([address]     Bradycardia 10/03/2019    Fall at home 10/03/2019    Hyponatremia 10/03/2019    Hyperkalemia 10/03/2019    Hypotension 10/03/2019    [facility])     Hypertension     Anemia in CKD (chronic kidney disease)     Gram negative sepsis (HCC)     Klebsiella pneumoniae sepsis (HCC)     Uncontrolled type 2 diabetes mellitus with hyperglycemia ([address]     Pneumonia 2019        Past Medical History:   Diagnosis Date    Acute metabolic encephalopathy     Anemia in CKD (chronic kidney disease)     Blind right eye     Chronic hyponatremia     CKD (chronic kidney disease) stage 4, GFR 15-29 ml/min (HCC)     Diabetes mellitus ([address]     Hypertension     Kidney stone     Klebsiella pneumoniae sepsis (HCC)     Mixed hyperlipidemia     Obstructive uropathy     Paroxysmal A-fib (HCC)     Pneumonia     Sacral ulcer ([address]     Urethral stricture     UTI due to Klebsiella species      Past Surgical History:   Procedure Laterality Date   [address] Chart Reviewed: Yes    Restrictions:  Restrictions/Precautions: Fall Risk;Contact Precautions    SUBJECTIVE:   Subjective: \"Get me better. \"    Pain    Pt notes fatigue in LLE post standing activities. No pain reported. OBJECTIVE:        Bed mobility  Supine to Sit: Maximum assistance  Sit to Supine: Moderate assistance  Scooting: Maximal assistance;2 Person assistance (Pt attempts correct technique, however, limited d/t decreased strength)  Bed Mobility Comments: HOB slightly elevated. Use of bed rail with cues for sequencing. Increased time and effort used    Transfers  Sit to Stand: Maximum Assistance;2 Person Assistance; Moderate Assistance  Stand to sit: Maximum Assistance;2 Person Assistance; Moderate Assistance  Comment: Patient with good follow through with cues for hand position and proper execution. SLight knee and trunk flexion upon standing. Able to stand with mod to Dulce with ww. STS x 3    Ambulation  Comments: unsafe at this time    Stairs/Curb  Stairs?: No              PT Exercises  Dynamic Standing Balance Exercises: Forward and retro stepping, weight shifting tasks in FWW          Activity Tolerance  Activity Tolerance: Patient limited by fatigue;Patient limited by endurance  Activity Tolerance Comments: pt limited by strength and balance deficits          ASSESSMENT   Body Structures, Functions, Activity Limitations Requiring Skilled Therapeutic Intervention: Decreased functional mobility ; Decreased strength;Decreased endurance;Decreased balance;Decreased posture  Assessment: Patient needed increased time and effort this date for all functional mobility. Limited by endurance, requiring increased rest break time between STS trials. Good follow through and tolerance with standing stepping and weightshifting tasks.   Therapy Prognosis: Fair;Good     Discharge Recommendations:  Continue to assess pending progress         Goals  Short Term Goals  Short term goal 1: Pt will demonstrate HEP CGA  Long Term Goals  Long term goal 1: Pt will demonstrate bed mobility CGA  Long term goal 2: Pt will demonstrate transfers CGA with safest AD  Long term goal 3: Pt will demonstrate amb >/= 10ft CGA with safest AD    PLAN    Plan: 1 time a day 3-6 times a week  Safety Devices  Type of Devices: All fall risk precautions in place,Call light within reach,Bed alarm in place,Left in bed (RN in room passing medications at end of session)     AMPAC (6 CLICK) BASIC MOBILITY  AM-PAC Inpatient Mobility Raw Score : 9     Therapy Time   Individual   Time In 1319   Time Out 1342   Minutes 23      Bm/Tr: 10   THerEx: [address], PTA, 06/15/22 at 1:57 PM         Definitions for assistance levels  Independent = pt does not require any physical supervision or assistance from another person for activity completion. Device may be needed.   Stand by assistance = pt requires verbal cues or instructions from another person, close to but not touching, to perform the activity  Minimal assistance= pt performs 75% or more of the activity; assistance is required to complete the activity  Moderate assistance= pt performs 50% of the activity; assistance is required to complete the activity  Maximal assistance = pt performs 25% of the activity; assistance is required to complete the activity  Dependent = pt requires total physical assistance to accomplish the task

## 2022-06-15 NOTE — DISCHARGE SUMMARY
Physician Discharge Summary     Patient ID:  Cali Castellanos  [id number]  37 y.o.  2/28/1932    Admit date: 6/11/2022    Discharge date : 06/15/22     Admitting Physician: Liz Vargas MD     Discharge Physician: Pineda Herrera DO     Admission Diagnoses: Hyperkalemia [E87.5]  Hyponatremia [E87.1]  Uremia [N19]  Obstructive uropathy [N13.9]  Elevated troponin [R77.8]  Acute kidney injury superimposed on CKD ([address] [N17.9, N18.9]    Discharge Diagnoses: Proteus bacteremia 2/2 UTI in setting of obstructive uropathy    Admission Condition: poor    Discharged Condition: stable    Hospital Course: 51-year-old male with multiple medical problems including BPH/type 2 diabetes mellitus who was brought into the hospital via EMS after they were initially called due to left assist however after the prom in the chair the patient went unresponsive for 30 seconds to 1 minute when he came to fluids patient was brought in here, here in the hospital patient was found to have hyponatremia/hyperkalemia/acute on chronic kidney disease the patient does not really recall the incident however the report was gathered by the wife at bedside who stated that he has been getting progressively weak to the point that he needs help with his daily activities. Pt current with hospice but family wanted pt admitted despite this. Pt was hydrated and nephrology consulted. Blood cultures came back with proteus so ID was consulted. Pt started on IV abx. US abd showed severe urinary retention and bilateral hydronephrosis. Pt with history of urethral stricture and figueroa was unable to be placed so urology consulted and placed figueroa which will remain in place at discharge. Renal function improved after figueroa insertion and sensitivities came back. As IV abx not indicated with hospice services, ID recommended 7 days of Cipro at discharge. Pt medically stable and discharged home to resume care with hospice on 6/15.     Consults: ID, nephrology and urology    Discharge Exam:  Constitutional: Awake and alert in no acute distress. Lying in bed comfortably, hard of hearing  Head: Normocephalic, atraumatic  Eyes: EOMI, PERRLA  ENT: dry mucous membranes  Neck: neck supple, trachea midline  Lungs: Good inspiratory effort, no wheeze, no rhonchi, no rales  Heart: RRR, normal S1 and S2  GI: Soft, non-distended, non tender, no guarding, no rebound, +BS  MSK: no edema noted  Skin: warm, dry    Labs:   Recent Labs     06/13/22  0458   WBC 10.5   HGB 8.0*   HCT 24.1*        Recent Labs     06/14/22  1723 06/15/22  0101 06/15/22  0923    134* 133*   K 3.3* 3.2* 3.6   CL 96 96 94*   CO2 25 27 26   BUN 86* 84* 80*   CREATININE 3.54* 3.62* 3.35*   CALCIUM 8.2* 8.1* 8.3*     No results for input(s): AST, ALT, BILIDIR, BILITOT, ALKPHOS in the last 72 hours. No results for input(s): INR in the last 72 hours. No results for input(s): [place] in the last 72 hours. Urinalysis:   Lab Results   Component Value Date    NITRU Negative 12/19/2020    WBCUA  12/19/2020    BACTERIA MODERATE 12/19/2020    RBCUA 5-10 12/19/2020    BLOODU LARGE 12/19/2020    SPECGRAV 1.010 12/19/2020    GLUCOSEU Negative 12/19/2020       Radiology:   Most recent    Chest CT      WITH CONTRAST:No results found for this or any previous visit. WITHOUT CONTRAST: Results for orders placed during the hospital encounter of 01/18/22    CT CHEST WO CONTRAST    Narrative  EXAMINATION: CT CHEST WO CONTRAST, CT ABDOMEN PELVIS WO CONTRAST    DATE:1/18/2022 9:01 AM    CLINICAL HISTORY: Anterior chest pain. Shortness of breath. fall left side memo wall pain    COMPARISON:  None    TECHNIQUE: Helical CT was performed through the chest without IV contrast., All CT scans at this facility use dose modulation, iterative reconstruction, and/or weight based dosing when appropriate to reduce radiation dose to as low as reasonably  achievable.  Some of this report was completed using [employer] [id number] and may include unintended errors with respect to translation of words, typographical errors or grammatical errors which may not have been identified prior  to the finalization of this report. FINDINGS:    Lungs: Small patchy reticular opacities at the lung bases probably dependent changes. Small nonspecific patch of groundglass opacity left midlung zone possibly inflammatory. Pleura:Normal. No effusion or thickening    Mediastinum :Mediastinum and alban are unremarkable. Vessels:Thoracic aorta is intact. Bones:No acute osseous abnormalities. Other:None    Impression  NEGATIVE CT OF THE CHEST. EXAMINATION: CT CHEST WO CONTRAST, CT ABDOMEN PELVIS WO CONTRAST    DATE AND TIME:1/18/2022 9:01 AM    CLINICAL HISTORY: Acute abdominal pain. Epigastric pain. fall left side memo wall pain    COMPARISON: None available. TECHNIQUE: Contiguous axial CT sections of the abdomen and pelvis. No IV contrast administered. Unenhanced imaging is limited for the evaluation of some intra-abdominal and pelvic pathologies. All CT scans at this facility use dose modulation,  iterative reconstruction, and/or weight based dosing when appropriate to reduce radiation dose to as low as reasonably achievable. Some of this report was completed using  [employer] [id number] and may include unintended errors  with respect to translation of words, typographical errors or grammatical errors which may not have been identified prior to the finalization of this report. FINDINGS    Liver: Negative    Spleen: Negative    Gallbladder: No calcified gallstones. Normal gallbladder wall. No pericholecystic fluid. Pancreas: Negative    Kidney: Bilateral severe hydroureteronephrosis. No obstructing stone. Adrenal glands are negative. Bowel: Negative  No CT evidence of appendicitis    Nodes: No lymphadenopathy.     Aorta: Negative    Pelvis: Moderately distended bladder with slightly lobulated bladder wall. Assessment of bladder wall mucosa limited on these noncontrast scans. No abnormal soft tissue mass    Peritoneum: No free fluid or free air. The abdominal wall is intact. Bones: No acute osseous abnormalities. Other:None          IMPRESSION: SEVERE BILATERAL HYDROURETERONEPHROSIS. NO OBSTRUCTING STONE. BLADDER DETAIL LIMITED ON THESE NONCONTRAST SCANS. CXR      2-view: No results found for this or any previous visit. Portable: Results for orders placed during the hospital encounter of 06/11/22    XR CHEST PORTABLE    Narrative  EXAMINATION:  CHEST RADIOGRAPH (PORTABLE SINGLE VIEW AP)    Exam Date/Time:  6/11/2022 11:24 PM  Clinical History:   PNA, syncope  Comparison:  CT chest 1/18/2022. RESULT:    Lines, tubes, and devices:  None. Lungs and pleura:  No focal consolidation. No pneumothorax. No pleural effusion. Cardiomediastinal silhouette:  Stable cardiomediastinal silhouette. Atherosclerotic calcification of the aortic arch. Other: No acute osseous process. Impression  No acute cardiopulmonary abnormality. Echo No results found for this or any previous visit.       Disposition: home    In process/preliminary results:  Outstanding Order Results     Date and Time Order Name Status Description    6/12/2022  5:20 AM OSMOLALITY In process     6/12/2022 12:18 AM Culture, Blood 2 Preliminary     6/11/2022 11:43 PM Culture, Blood 1 Preliminary           Patient Instructions:   Current Discharge Medication List      START taking these medications    Details   ciprofloxacin (CIPRO) 500 MG tablet Take 1 tablet by mouth daily for 7 days  Qty: 7 tablet, Refills: 0         CONTINUE these medications which have NOT CHANGED    Details   sodium bicarbonate 650 MG tablet Take 2 tablets by mouth 3 times daily  Qty: 90 tablet, Refills: 3      alfuzosin (UROXATRAL) 10 MG extended release tablet Take 1 tablet by mouth daily (with breakfast)  Qty: 30 tablet, Refills: 3      aspirin 81 MG tablet Take 81 mg by mouth daily      vitamin B-12 (CYANOCOBALAMIN) 500 MCG tablet Take 500 mcg by mouth daily      insulin glargine (LANTUS SOLOSTAR) 100 UNIT/ML injection pen Inject 10 Units into the skin nightly  Qty: 5 pen, Refills: 3      insulin lispro (HUMALOG KWIKPEN) 100 UNIT/ML pen Glucose 100-120 no insulin, 121-140 2 units, 141-160 4 units, 161- 180 6 units, 181-200 8 units, 200 or higher 10 units  Qty: 5 pen, Refills: 3           Activity: activity as tolerated  Diet: regular diet  Wound Care: none needed    Follow-up with Ashley Garcia MD  in 1 week.     DC time 35 minutes    Signed:  Electronically signed by [facility]SD[facility] Cristal on 6/15/2022 at 3:28 PM

## 2022-06-15 NOTE — PROGRESS NOTES
[facility] OCCUPATIONAL THERAPY MED SURG TREATMENT NOTE     Date: 6/15/2022  Patient Name: Aram Nj        MRN: [id number]  Account: [de-identified]   : 1932  (80 y.o.)  Room: [facility] [address]    Chart Review:    Restrictions  Restrictions/Precautions  Restrictions/Precautions: Fall Risk,Contact Precautions (MRSA)     Safety:  Safety Devices  Type of Devices: All fall risk precautions in place;Call light within reach; Chair alarm in place; Left in chair;Nurse notified (RN in room passing medications at end of session)  Chair alarm pad in place, however, alarm box not available. Notified RN and PCA that alarm box is not in place. Patient's birthday verified: Yes    Subjective:    Pain at start of treatment: No    Pain at end of treatment: No  Pt denies pain, however, reports intermittent stomach ache. Objective: Pt in bed resting upon arrival. Pt reports that he is \"full\" and needs washed up. Pt noted to be incontinent of liquid stool in bed. Assisted pt to EOB in order to wash up. ADL Status:  Grooming: Setup;Supervision;Verbal cueing; Increased time to complete  Grooming Skilled Clinical Factors: To complete oral care, hair care, and wash face. Verbal/visual cues for initiation and sequencing  UE Bathing: Setup;Supervision;Verbal cueing  UE Bathing Skilled Clinical Factors: VCs for initiation, sequencing, and thoroughness  LE Bathing: Dependent/Total  LE Bathing Skilled Clinical Factors: Assistance to wash tee areas and BLEs. Pt limited by ridgid, kyphotic posture  UE Dressing: Unable to assess  UE Dressing Skilled Clinical Factors: Hospital gown only  LE Dressing: Dependent/Total  LE Dressing Skilled Clinical Factors: To doff/don bilateral socks. Pt limited by ridgid, kyphotic posture  Toileting: Dependent/Total  Toileting Skilled Clinical Factors: Pt incontinent of runny, liquid stool.  Lopez catheter    Bed Mobility  Supine to Sit: Maximum assistance  Scooting: Maximal assistance (Pt attempts correct technique, however, limited d/t decreased strength)  Bed Mobility Comments: HOB slightly elevated. No use of bed rails. Increased time and effort with slow initiation and movements    Transfers:  Sit to stand: 2 Person assistance;Maximum assistance  Stand to sit: Maximum assistance;2 Person assistance    Cognition Status:  Cognition  Overall Cognitive Status: Exceptions  Arousal/Alertness: Delayed responses to stimuli (possibly d/t [place])  Following Commands: Follows one step commands with increased time; Follows one step commands with repetition  Attention Span: Attends with cues to redirect  Safety Judgement: Decreased awareness of need for safety  Initiation: Requires cues for all  Sequencing: Requires cues for all    Functional Mobility:  Device: FWW  Activity: To/From EOB a few steps to bedside chair  Assistance Level: Min A with verbal/visual cues for initiating and sequencing foot movement. Pt in bedside chair at end of session with RN passing medications. Therapy key for assistance levels -   Independent/Mod I = Pt. is able to perform task with no assistance but may require a device   Stand by assistance = Pt. does not perform task at an independent level but does not need physical assistance, requires verbal cues  Minimal, Moderate, Maximal Assistance = Pt. requires physical assistance (25%, 50%, 75% assist from helper) for task but is able to actively participate in task   Dependent = Pt. requires total assistance with task and is not able to actively participate with task completion    Functional Endurance:  Activity Tolerance  Activity Tolerance: Patient limited by fatigue    Treatment consisted of:  ADL training    Assessment/Discharge Disposition: Pt would benefit from continued Occupational Therapy to increase strength, activity tolerance, [place] with functional transfers, and [place] with ADL/IADL completion.        [employer]  How much help for putting on and taking off regular

## 2022-06-15 NOTE — PROGRESS NOTES
Nephrology Progress Note    Assessment:  80 y.o. male with history s/f CKD stage IV, chronic hydronephrosis, A.fib, T2DM, HTN, HLD who presented for AMS.    1. PAULINO on CKD stage IV: likely 2/2 obstructive nephropathy +/-  hypotension, BP borderline low on presentation, Scr 4, baseline scr 2.9-3 w/ eGFR high teens, risk factors of CKD include T2DM, HTN, obstructive uropathy (has severe bilateral hydronephrosis which is chronic)  2. Hyperkalemia: improved w/ medical management   3. Hyponatremia  4. Metabolic acidosis: 2/2 renal failure, chronic, dating as far back as 2019, also w/ mild lactic acidosis, corrected  5. Hypoalbuminemia  6. Anemia  7.  Urinary retention: urology onboard, s/p figueroa placement       Plan:  - monitor acidosis as may likely need to still be discharged on PO bicarb   - continue ZACK during admission  - monitor function, improving w/ fluids and figueroa placement   - home w/ hospice    Patient Active Problem List:     Pneumonia     Uncontrolled type 2 diabetes mellitus with hyperglycemia (HCC)     Gram negative sepsis (HCC)     Klebsiella pneumoniae sepsis ([address]     Bradycardia     Fall at home     Hyponatremia     Hyperkalemia     Paroxysmal A-fib (HCC)     Hypertension     Anemia in CKD (chronic kidney disease)     Hypotension     Ataxic gait     Dementia due to Alzheimer's disease ([address]     Chronic kidney disease, stage IV (severe) (HCC)     Anemia of chronic renal failure     General weakness     Hydronephrosis     Syncope and collapse      Subjective:  Admit Date: 6/11/2022    Interval History: function continues to improve     Medications:  Scheduled Meds:   cefTRIAXone (ROCEPHIN) IV  1,000 mg IntraVENous Q24H    epoetin dahlia-epbx  10,000 Units SubCUTAneous Q7 Days    aspirin  81 mg Oral Daily    insulin glargine  5 Units SubCUTAneous Nightly    vitamin B-12  500 mcg Oral Daily    sodium chloride flush  5-40 mL IntraVENous 2 times per day    heparin (porcine)  5,000 Units SubCUTAneous TID Continuous Infusions:   sodium chloride         CBC:   Recent Labs     06/13/22  0458   WBC 10.5   HGB 8.0*        CMP:    Recent Labs     06/14/22  1723 06/15/22  0101 06/15/22  0923    134* 133*   K 3.3* 3.2* 3.6   CL 96 96 94*   CO2 25 27 26   BUN 86* 84* 80*   CREATININE 3.54* 3.62* 3.35*   GLUCOSE 282* 227* 288*   CALCIUM 8.2* 8.1* 8.3*   LABGLOM 16.3* 15.9* 17.4*     Troponin:   No results for input(s): TROPONINI in the last 72 hours. BNP: No results for input(s): BNP in the last 72 hours. INR: No results for input(s): INR in the last 72 hours. Lipids:   No results for input(s): CHOL, LDLDIRECT, TRIG, HDL, AMYLASE, LIPASE in the last 72 hours. Liver:   No results for input(s): AST, ALT, ALKPHOS, PROT, LABALBU, BILITOT in the last 72 hours. Invalid input(s): BILDIR  Iron:  No results for input(s): IRONS, FERRITIN in the last 72 hours. Invalid input(s): LABIRONS  Urinalysis: No results for input(s): UA in the last 72 hours.     Objective:  Vitals: BP (!) 135/50   Pulse 89   Temp 99.1 °F (37.3 °C) (Oral)   Resp 18   Ht 5' 11\" (1.803 m)   Wt 171 lb 8 oz (77.8 kg)   SpO2 92%   BMI 23.92 kg/m²    Wt Readings from Last 3 Encounters:   06/13/22 171 lb 8 oz (77.8 kg)   01/18/22 175 lb (79.4 kg)   12/28/20 150 lb (68 kg)      24HR INTAKE/OUTPUT:      Intake/Output Summary (Last 24 hours) at 6/15/2022 1100  Last data filed at 6/15/2022 0900  Gross per 24 hour   Intake 150 ml   Output 3000 ml   Net -2850 ml       General: alert, in no apparent distress  HEENT: normocephalic, atraumatic, anicteric  Neck: supple, no mass  Lungs: non-labored respirations, clear to auscultation bilaterally  Heart: regular rate and rhythm, no murmurs or rubs  Abdomen: soft, non-tender, non-distended  Ext: no cyanosis, no peripheral edema  Neuro: responsive, follows commands         Electronically signed by Jonnie Mulligan MD, MD

## 2022-06-16 VITALS
DIASTOLIC BLOOD PRESSURE: 55 MMHG | SYSTOLIC BLOOD PRESSURE: 130 MMHG | WEIGHT: 153.3 LBS | TEMPERATURE: 98.4 F | OXYGEN SATURATION: 94 % | BODY MASS INDEX: 21.46 KG/M2 | HEART RATE: 102 BPM | RESPIRATION RATE: 18 BRPM | HEIGHT: 71 IN

## 2022-06-16 LAB
ANION GAP SERPL CALCULATED.3IONS-SCNC: 10 MEQ/L (ref 9–15)
ANION GAP SERPL CALCULATED.3IONS-SCNC: 12 MEQ/L (ref 9–15)
BUN BLDV-MCNC: 75 MG/DL (ref 8–23)
BUN BLDV-MCNC: 78 MG/DL (ref 8–23)
CALCIUM SERPL-MCNC: 7.8 MG/DL (ref 8.5–9.9)
CALCIUM SERPL-MCNC: 7.9 MG/DL (ref 8.5–9.9)
CHLORIDE BLD-SCNC: 98 MEQ/L (ref 95–107)
CHLORIDE BLD-SCNC: 98 MEQ/L (ref 95–107)
CO2: 24 MEQ/L (ref 20–31)
CO2: 26 MEQ/L (ref 20–31)
CREAT SERPL-MCNC: 2.92 MG/DL (ref 0.7–1.2)
CREAT SERPL-MCNC: 2.95 MG/DL (ref 0.7–1.2)
CULTURE, BLOOD 2: ABNORMAL
GFR AFRICAN AMERICAN: 24.4
GFR AFRICAN AMERICAN: 24.6
GFR NON-AFRICAN AMERICAN: 20.1
GFR NON-AFRICAN AMERICAN: 20.4
GLUCOSE BLD-MCNC: 183 MG/DL (ref 70–99)
GLUCOSE BLD-MCNC: 211 MG/DL (ref 70–99)
GLUCOSE BLD-MCNC: 272 MG/DL (ref 70–99)
GLUCOSE BLD-MCNC: 311 MG/DL (ref 70–99)
MAGNESIUM: 1.5 MG/DL (ref 1.7–2.4)
MAGNESIUM: 1.7 MG/DL (ref 1.7–2.4)
PERFORMED ON: ABNORMAL
PERFORMED ON: ABNORMAL
POTASSIUM REFLEX MAGNESIUM: 3.3 MEQ/L (ref 3.4–4.9)
POTASSIUM REFLEX MAGNESIUM: 3.4 MEQ/L (ref 3.4–4.9)
SODIUM BLD-SCNC: 134 MEQ/L (ref 135–144)
SODIUM BLD-SCNC: 134 MEQ/L (ref 135–144)

## 2022-06-16 PROCEDURE — 6370000000 HC RX 637 (ALT 250 FOR IP): Performed by: INTERNAL MEDICINE

## 2022-06-16 PROCEDURE — 80048 BASIC METABOLIC PNL TOTAL CA: CPT

## 2022-06-16 PROCEDURE — 6370000000 HC RX 637 (ALT 250 FOR IP): Performed by: NURSE PRACTITIONER

## 2022-06-16 PROCEDURE — 36415 COLL VENOUS BLD VENIPUNCTURE: CPT

## 2022-06-16 PROCEDURE — 83735 ASSAY OF MAGNESIUM: CPT

## 2022-06-16 RX ADMIN — CYANOCOBALAMIN TAB 500 MCG 500 MCG: 500 TAB at 10:03

## 2022-06-16 RX ADMIN — CIPROFLOXACIN 500 MG: 500 TABLET, FILM COATED ORAL at 10:00

## 2022-06-16 RX ADMIN — INSULIN LISPRO 9 UNITS: 100 INJECTION, SOLUTION INTRAVENOUS; SUBCUTANEOUS at 12:07

## 2022-06-16 RX ADMIN — ASPIRIN 81 MG: 81 TABLET, CHEWABLE ORAL at 10:03

## 2022-06-16 NOTE — PROGRESS NOTES
Physical Therapy  Facility/Department: [facility] B388/D705-04  Physical Therapy Discharge      NAME: Guillermina Carranza    : 1932 (80 y.o.)  MRN: [id number]    Account: [de-identified]  Gender: male      Patient has been discharged from acute care hospital. DC patient from current PT program.      Electronically signed by Linda Roland PT on 22 at 4:08 PM EDT

## 2022-06-16 NOTE — DISCHARGE INSTR - COC
Continuity of Care Form    Patient Name: Estefani Segovia   :  1932  MRN:  [id number]    [facility] date:  2022  Discharge date:  22    Code Status Order: DNR-CCA   Advance Directives:      Admitting Physician:  Hayden Spears MD  PCP: Minor Fisher MD    Discharging Nurse: Leda Jackman RN  [address] Unit/Room#: T686/Z986-98  Discharging Unit Phone Number: [phone]    Emergency Contact:   Extended Emergency Contact Information  Primary Emergency Contact: Liz Garcia, [address] Phone: [phone]  Relation: Spouse  Secondary Emergency Contact: Rhina Mullins, [address] Phone: [phone]  Relation: Child    Past Surgical History:  Past Surgical History:   Procedure Laterality Date    [address]       Immunization History:   Immunization History   Administered Date(s) Administered    COVID-19, Pushpay top, DO NOT Dilute, Noah-Sucrose, 12+ yrs, PF, 30 mcg/0.3 mL dose 2022    Influenza, Quadv, IM, PF (6 mo and older Fluzone, Flulaval, Fluarix, and 3 yrs and older Afluria) 10/05/2019       Active Problems:  Patient Active Problem List   Diagnosis Code    Pneumonia J18.9    Uncontrolled type 2 diabetes mellitus with hyperglycemia ([facility] [address] E11.65    Gram negative sepsis ([facility] [address] A41.50    Klebsiella pneumoniae sepsis ([facility] [address] A41.4    Bradycardia R00.1    Fall at home [address] [place], Y92.009    Hyponatremia E87.1    Hyperkalemia E87.5    Paroxysmal A-fib (HCC) I48.0    Hypertension I10    Anemia in CKD (chronic kidney disease) N18.9, D63.1    Hypotension I95.9    Ataxic gait R26.0    Dementia due to Alzheimer's disease ([facility] [address] G30.9, F02.80    Chronic kidney disease, stage IV (severe) (HCC) N18.4    Anemia of chronic renal failure N18.9, D63.1    General weakness R53.1    Hydronephrosis N13.30    Syncope and collapse R55    Obstructive uropathy N13.9    Gram-negative bacteremia R78.81    Acute kidney injury superimposed on CKD (HCC) N17.9, N18.9 Isolation/Infection:   Isolation            Contact          Patient Infection Status       Infection Onset Added Last Indicated Last Indicated By Review Planned Expiration Resolved Resolved By    MRSA 10/04/19 10/06/19 10/04/19 Urine Culture        MRSA urine on 10/04/2019. Electronically signed by Elisa Johnson RN on 10/7/2019 at 3:11 PM     Resolved    COVID-19 (Rule Out) 12/18/20 12/18/20 12/18/20 COVID-19 (Ordered)   12/18/20 Rule-Out Test Resulted            Nurse Assessment:  Last Vital Signs: BP (!) 130/55   Pulse (!) 102   Temp 98.4 °F (36.9 °C)   Resp 18   Ht 5' 11\" (1.803 m)   Wt 153 lb 4.8 oz (69.5 kg)   SpO2 94%   BMI 21.38 kg/m²     Last documented pain score (0-10 scale): Pain Level: 3  Last Weight:   Wt Readings from Last 1 Encounters:   06/16/22 153 lb 4.8 oz (69.5 kg)     Mental Status:  alert, coherent, logical, and thought processes intact    IV Access:  - None    Nursing Mobility/ADLs:  Walking   Assisted  Transfer  Assisted  Bathing  Assisted  Dressing  Assisted  Toileting  Assisted  Feeding  Independent  Med Admin  Assisted  Med Delivery   whole    Wound Care Documentation and Therapy:  Wound 07/24/19 Buttocks Right (Active)   Number of days: 7335       Wound 10/04/19 Buttocks Left Incontinence Associated derm (IAD) (Active)   Number of days: 986       Wound 10/04/19 Buttocks Right (Active)   Number of days: 986       Wound 12/18/20 Buttocks Left stage 2 (Active)   Number of days: 544       Wound 12/18/20 Buttocks Right stage 2 (Active)   Number of days: 544        Elimination:  Continence: Bowel: Yes  Bladder: figueroa    Urinary Catheter: Insertion Date: 6/13/22    Colostomy/Ileostomy/Ileal Conduit: No       Date of Last BM: 6/15/22    Intake/Output Summary (Last 24 hours) at 6/16/2022 1226  Last data filed at 6/16/2022 0557  Gross per 24 hour   Intake 720 ml   Output 1550 ml   Net -830 ml     I/O last 3 completed shifts:   In: 7931 [P.O.:1420; IV Piggyback:50]  Out: 1590 [Urine:2950]    Safety Concerns: At Risk for Falls    Impairments/Disabilities:      Vision and Hearing    Nutrition Therapy:  Current Nutrition Therapy:   - Oral Diet:  General    Routes of Feeding: Oral  Liquids: Thin Liquids  Daily Fluid Restriction: no  Last Modified Barium Swallow with Video (Video Swallowing Test): not done    Treatments at the Time of Hospital Discharge:   Respiratory Treatments: no  Oxygen Therapy:  is not on home oxygen therapy. Ventilator:    - No ventilator support    Rehab Therapies: {THERAPEUTIC INTERVENTION:4541451011}  Weight Bearing Status/Restrictions: No weight bearing restrictions  Other Medical Equipment (for information only, NOT a DME order):  hospital bed  Other Treatments: ***    Patient's personal belongings (please select all that are sent with patient):  Glass eye    RN SIGNATURE:  Electronically signed by Shefali Sharma RN on 6/16/22 at 12:32 PM EDT    CASE MANAGEMENT/SOCIAL WORK SECTION    Inpatient Status Date: ***    Readmission Risk Assessment Score:  Readmission Risk              Risk of Unplanned Readmission:  21           Discharging to Facility/ Agency   Name:   Address:  Phone:  Fax:    Dialysis Facility (if applicable)   Name:  Address:  Dialysis Schedule:  Phone:  Fax:    / signature: {Esignature:[id number]}    PHYSICIAN SECTION    Prognosis: {Prognosis:3109848023}    Condition at Discharge: [facility] Patient Condition:053699725}    Rehab Potential (if transferring to Rehab): {Prognosis:1682794268}    Recommended Labs or Other Treatments After Discharge: ***    Physician Certification: I certify the above information and transfer of Marcelle Oakley  is necessary for the continuing treatment of the diagnosis listed and that he requires {Admit to Appropriate Level of Care:93786} for {GREATER/LESS:885437265} 30 days.      Update Admission H&P: {CHP DME Changes in HHQ:036977437}    PHYSICIAN SIGNATURE:  {Esignature:888600166}

## 2022-06-16 NOTE — CARE COORDINATION
RODNEYW spoke with Michael Alston from [facility]. He said patient's DME is supposed to be delivered this morning and transportation home is scheduled for 1:00 with [facility].

## 2022-06-17 LAB — BLOOD CULTURE, ROUTINE: NORMAL
